# Patient Record
Sex: MALE | Race: WHITE | NOT HISPANIC OR LATINO | Employment: OTHER | URBAN - METROPOLITAN AREA
[De-identification: names, ages, dates, MRNs, and addresses within clinical notes are randomized per-mention and may not be internally consistent; named-entity substitution may affect disease eponyms.]

---

## 2017-05-08 ENCOUNTER — GENERIC CONVERSION - ENCOUNTER (OUTPATIENT)
Dept: OTHER | Facility: OTHER | Age: 77
End: 2017-05-08

## 2017-05-08 ENCOUNTER — ALLSCRIPTS OFFICE VISIT (OUTPATIENT)
Dept: OTHER | Facility: OTHER | Age: 77
End: 2017-05-08

## 2017-05-08 DIAGNOSIS — Z12.5 ENCOUNTER FOR SCREENING FOR MALIGNANT NEOPLASM OF PROSTATE: ICD-10-CM

## 2017-05-08 DIAGNOSIS — I10 ESSENTIAL (PRIMARY) HYPERTENSION: ICD-10-CM

## 2017-05-11 ENCOUNTER — GENERIC CONVERSION - ENCOUNTER (OUTPATIENT)
Dept: OTHER | Facility: OTHER | Age: 77
End: 2017-05-11

## 2017-05-16 ENCOUNTER — LAB CONVERSION - ENCOUNTER (OUTPATIENT)
Dept: OTHER | Facility: OTHER | Age: 77
End: 2017-05-16

## 2017-05-16 LAB
25(OH)D3 SERPL-MCNC: 62 NG/ML (ref 30–100)
A/G RATIO (HISTORICAL): 1.5 (CALC) (ref 1–2.5)
ALBUMIN SERPL BCP-MCNC: 3.9 G/DL (ref 3.6–5.1)
ALP SERPL-CCNC: 59 U/L (ref 40–115)
ALT SERPL W P-5'-P-CCNC: 13 U/L (ref 9–46)
AST SERPL W P-5'-P-CCNC: 20 U/L (ref 10–35)
BACTERIA UR QL AUTO: ABNORMAL /HPF
BASOPHILS # BLD AUTO: 0.5 %
BASOPHILS # BLD AUTO: 33 CELLS/UL (ref 0–200)
BILIRUB SERPL-MCNC: 0.6 MG/DL (ref 0.2–1.2)
BILIRUB UR QL STRIP: NEGATIVE
BUN SERPL-MCNC: 29 MG/DL (ref 7–25)
BUN/CREA RATIO (HISTORICAL): 19 (CALC) (ref 6–22)
CALCIUM SERPL-MCNC: 9.3 MG/DL (ref 8.6–10.3)
CHLORIDE SERPL-SCNC: 104 MMOL/L (ref 98–110)
CHOLEST SERPL-MCNC: 151 MG/DL (ref 125–200)
CHOLEST/HDLC SERPL: 3.7 (CALC)
CO2 SERPL-SCNC: 28 MMOL/L (ref 20–31)
COLOR UR: YELLOW
COMMENT (HISTORICAL): CLEAR
CREAT SERPL-MCNC: 1.51 MG/DL (ref 0.7–1.18)
DEPRECATED RDW RBC AUTO: 15.2 % (ref 11–15)
EGFR AFRICAN AMERICAN (HISTORICAL): 51 ML/MIN/1.73M2
EGFR-AMERICAN CALC (HISTORICAL): 44 ML/MIN/1.73M2
EOSINOPHIL # BLD AUTO: 247 CELLS/UL (ref 15–500)
EOSINOPHIL # BLD AUTO: 3.8 %
FECAL OCCULT BLOOD DIAGNOSTIC (HISTORICAL): NEGATIVE
GAMMA GLOBULIN (HISTORICAL): 2.6 G/DL (CALC) (ref 1.9–3.7)
GLUCOSE (HISTORICAL): 91 MG/DL (ref 65–99)
GLUCOSE (HISTORICAL): NEGATIVE
HCT VFR BLD AUTO: 43.7 % (ref 38.5–50)
HDLC SERPL-MCNC: 41 MG/DL
HGB BLD-MCNC: 14.9 G/DL (ref 13.2–17.1)
HYALINE CASTS #/AREA URNS LPF: ABNORMAL /LPF
KETONES UR STRIP-MCNC: NEGATIVE MG/DL
LDL CHOLESTEROL (HISTORICAL): 90 MG/DL (CALC)
LEUKOCYTE ESTERASE UR QL STRIP: NEGATIVE
LYMPHOCYTES # BLD AUTO: 2191 CELLS/UL (ref 850–3900)
LYMPHOCYTES # BLD AUTO: 33.7 %
MCH RBC QN AUTO: 32.3 PG (ref 27–33)
MCHC RBC AUTO-ENTMCNC: 34 G/DL (ref 32–36)
MCV RBC AUTO: 95 FL (ref 80–100)
MONOCYTES # BLD AUTO: 858 CELLS/UL (ref 200–950)
MONOCYTES (HISTORICAL): 13.2 %
NEUTROPHILS # BLD AUTO: 3172 CELLS/UL (ref 1500–7800)
NEUTROPHILS # BLD AUTO: 48.8 %
NITRITE UR QL STRIP: NEGATIVE
NON-HDL-CHOL (CHOL-HDL) (HISTORICAL): 110 MG/DL (CALC)
PH UR STRIP.AUTO: 5.5 [PH] (ref 5–8)
PLATELET # BLD AUTO: 147 THOUSAND/UL (ref 140–400)
PMV BLD AUTO: 9.8 FL (ref 7.5–12.5)
POTASSIUM SERPL-SCNC: 4.7 MMOL/L (ref 3.5–5.3)
PROSTATE SPECIFIC ANTIGEN TOTAL (HISTORICAL): 1.8 NG/ML
PROT UR STRIP-MCNC: ABNORMAL MG/DL
RBC # BLD AUTO: 4.6 MILLION/UL (ref 4.2–5.8)
RBC (HISTORICAL): ABNORMAL /HPF
SODIUM SERPL-SCNC: 141 MMOL/L (ref 135–146)
SP GR UR STRIP.AUTO: 1.03 (ref 1–1.03)
SQUAMOUS EPITHELIAL CELLS (HISTORICAL): ABNORMAL /HPF
TOTAL PROTEIN (HISTORICAL): 6.5 G/DL (ref 6.1–8.1)
TRIGL SERPL-MCNC: 98 MG/DL
TSH SERPL DL<=0.05 MIU/L-ACNC: 1.89 MIU/L (ref 0.4–4.5)
WBC # BLD AUTO: 6.5 THOUSAND/UL (ref 3.8–10.8)
WBC # BLD AUTO: ABNORMAL /HPF

## 2017-05-17 ENCOUNTER — GENERIC CONVERSION - ENCOUNTER (OUTPATIENT)
Dept: OTHER | Facility: OTHER | Age: 77
End: 2017-05-17

## 2017-06-14 DIAGNOSIS — I10 ESSENTIAL (PRIMARY) HYPERTENSION: ICD-10-CM

## 2017-06-14 LAB
BUN SERPL-MCNC: 26 MG/DL (ref 7–25)
BUN/CREA RATIO (HISTORICAL): 18 (CALC) (ref 6–22)
CALCIUM SERPL-MCNC: 9.3 MG/DL (ref 8.6–10.3)
CHLORIDE SERPL-SCNC: 106 MMOL/L (ref 98–110)
CO2 SERPL-SCNC: 31 MMOL/L (ref 20–31)
CREAT SERPL-MCNC: 1.48 MG/DL (ref 0.7–1.18)
EGFR AFRICAN AMERICAN (HISTORICAL): 52 ML/MIN/1.73M2
EGFR-AMERICAN CALC (HISTORICAL): 45 ML/MIN/1.73M2
GLUCOSE (HISTORICAL): 95 MG/DL (ref 65–99)
POTASSIUM SERPL-SCNC: 4.6 MMOL/L (ref 3.5–5.3)
SODIUM SERPL-SCNC: 143 MMOL/L (ref 135–146)

## 2017-08-16 ENCOUNTER — ALLSCRIPTS OFFICE VISIT (OUTPATIENT)
Dept: OTHER | Facility: OTHER | Age: 77
End: 2017-08-16

## 2017-08-16 DIAGNOSIS — I10 ESSENTIAL (PRIMARY) HYPERTENSION: ICD-10-CM

## 2017-09-13 ENCOUNTER — LAB CONVERSION - ENCOUNTER (OUTPATIENT)
Dept: OTHER | Facility: OTHER | Age: 77
End: 2017-09-13

## 2017-09-13 LAB
BUN SERPL-MCNC: 20 MG/DL (ref 7–25)
BUN/CREA RATIO (HISTORICAL): 14 (CALC) (ref 6–22)
CALCIUM SERPL-MCNC: 9.5 MG/DL (ref 8.6–10.3)
CHLORIDE SERPL-SCNC: 104 MMOL/L (ref 98–110)
CO2 SERPL-SCNC: 32 MMOL/L (ref 20–31)
CREAT SERPL-MCNC: 1.43 MG/DL (ref 0.7–1.18)
CREATININE, RANDOM URINE (HISTORICAL): 144 MG/DL (ref 20–370)
EGFR AFRICAN AMERICAN (HISTORICAL): 54 ML/MIN/1.73M2
EGFR-AMERICAN CALC (HISTORICAL): 47 ML/MIN/1.73M2
GLUCOSE (HISTORICAL): 91 MG/DL (ref 65–99)
POTASSIUM SERPL-SCNC: 4.4 MMOL/L (ref 3.5–5.3)
PROT UR-MCNC: 28 MG/DL (ref 5–25)
PROT/CREAT UR: 194 MG/G CREAT (ref 22–128)
SODIUM SERPL-SCNC: 143 MMOL/L (ref 135–146)
URIC ACID (HISTORICAL): 6.2 MG/DL (ref 4–8)

## 2017-11-14 ENCOUNTER — GENERIC CONVERSION - ENCOUNTER (OUTPATIENT)
Dept: OTHER | Facility: OTHER | Age: 77
End: 2017-11-14

## 2017-11-16 ENCOUNTER — GENERIC CONVERSION - ENCOUNTER (OUTPATIENT)
Dept: OTHER | Facility: OTHER | Age: 77
End: 2017-11-16

## 2017-11-21 ENCOUNTER — ALLSCRIPTS OFFICE VISIT (OUTPATIENT)
Dept: OTHER | Facility: OTHER | Age: 77
End: 2017-11-21

## 2017-11-22 NOTE — PROGRESS NOTES
Assessment    1  Never a smoker   2      Plan  Flu vaccine need    · Fluzone High-Dose 0 5 ML Intramuscular Suspension Prefilled Syringe  Health Maintenance    · Francisca Le  (Allergy/Immunology) Co-Management  *  Status: Hold For -Scheduling,Retrospective Authorization  Requested for: [id number]  Care Summary provided  : Yes    Discussion/Summary  Discussion Summary:   Jilda Nageotte is doing well he does did discuss his creatinine at length we reduced his hydrochlorothiazide to 12-,1/2 milligrams every other day his renal function seems to be stable  He will continue with the hydrochlorothiazide as noted above plus amlodipine 2 5 daily 5 milligrams of Crestor every other day and a variety of over-the-counter supplements that he likes and then 81 milligram aspirin will see him in 4 months for blood pressure check he is up-to-date with immunizations will refer him to an allergist       Chief Complaint  Chief Complaint Free Text Note Form: 6 month follow up  Chief Complaint [facility] Condition [facility]: Patient is here today for follow up of chronic conditions described in HPI  History of Present Illness  HPI: Fariha Solorio is here today for visit he feels quite well he just had a visit with the Dr Ce Mccullough his cardiologist which was satisfactory apparently Dr Delaney Jose ordered some lab studies which we have not yet received he feels well he and his wife are considering to downsized to a smaller residence  He had trouble with allergies in the past had been seeing Osiris Reeves is interested in seeing an allergist at this point      Review of Systems  Complete-Male:  Constitutional: No fever or chills, feels well, no tiredness, no recent weight gain or weight loss  Eyes: No complaints of eye pain, no red eyes, no discharge from eyes, no itchy eyes  ENT: as noted in HPI  Cardiovascular: No complaints of slow heart rate, no fast heart rate, no chest pain, no palpitations, no leg claudication, no lower extremity  Respiratory: No complaints of shortness of breath, no wheezing, no cough, no SOB on exertion, no orthopnea or PND  Gastrointestinal: No complaints of abdominal pain, no constipation, no nausea or vomiting, no diarrhea or bloody stools  Genitourinary: No complaints of dysuria, no incontinence, no hesitancy, no nocturia, no genital lesion, no testicular pain  Musculoskeletal: No complaints of arthralgia, no myalgias, no joint swelling or stiffness, no limb pain or swelling  Integumentary: No complaints of skin rash or skin lesions, no itching, no skin wound, no dry skin  Neurological: No compliants of headache, no confusion, no convulsions, no numbness or tingling, no dizziness or fainting, no limb weakness, no difficulty walking  Psychiatric: Is not suicidal, no sleep disturbances, no anxiety or depression, no change in personality, no emotional problems  Endocrine: No complaints of proptosis, no hot flashes, no muscle weakness, no erectile dysfunction, no deepening of the voice, no feelings of weakness  Hematologic/Lymphatic: No complaints of swollen glands, no swollen glands in the neck, does not bleed easily, no easy bruising  Active Problems  1  Atherosclerosis of coronary artery of native heart (414 01) (I25 10)   2  Benign hypertrophy of prostate (600 00) (N40 0)   3  Chronic renal disease (585 9) (N18 9)   4  Encounter for prostate cancer screening (V76 44) (Z12 5)   5  History of elevated prostate specific antigen (PSA) (V13 89) (B54 966)   6  Hyperlipidemia (272 4) (E78 5)   7  Hypertension (401 9) (I10)   8  Medicare annual wellness visit, subsequent (V70 0) (Z00 00)   9  Raynaud's syndrome without gangrene (443 0) (I73 00)    Past Medical History    1  Back pain (724 5) (M54 9)   2  History of 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 (600 21,599 69) (N40 1,N13 8)   3  History of elevated prostate specific antigen (PSA) (V13 89) (S25 415)    Surgical History  1   History of Inguinal Hernia Repair    Social History     ·    · Never a smoker  Social History Reviewed: The social history was reviewed and updated today  The social history was reviewed and is unchanged  Current Meds   1  AmLODIPine Besylate 2 5 MG Oral Tablet; TAKE 1 TABLET DAILY AS DIRECTED; Therapy: 39DME2003 to (Donita Broussard)  Requested for: [id number] Recorded   2  Aspirin 81 MG Oral Tablet Chewable; Therapy: (Shruti Schofield) to Recorded   3  Beta Carotene 44400 UNIT Oral Capsule; Therapy: (Shruti Schofield) to Recorded   4  Calcium 600 MG Oral Tablet; Therapy: (Shruti Schofield) to Recorded   5  Cinnamon 500 MG Oral Capsule; Therapy: (Shruti Schofield) to Recorded   6  CoQ-10 100 MG Oral Capsule; Therapy: (Shruti Schofield) to Recorded   7  Crestor 5 MG Oral Tablet; TAKE 1 TABLET EVERY OTHER DAY; Therapy: 21Nov2012 to Recorded   8  Finasteride 5 MG Oral Tablet; take 1 tablet every day; Therapy: 11VZJ2988 to ([phone])  Requested for: 27Oct2017; Last Rx:27Oct2017 Ordered   9  Glucosamine Chondr 500 Complex Oral Capsule; Therapy: (Shruti Schofield) to Recorded   10  Hair/Skin/Nails Oral Tablet; Therapy: (Shruti Schofield) to Recorded   11  HydroCHLOROthiazide 12 5 MG Oral Capsule; TAKE 1 CAPSULE EVERY OTHER DAY; Therapy: 39KKA7757 to Recorded   12  Magnesium 250 MG Oral Tablet; Therapy: (Shruti Schofield) to Recorded   13  Multi Vitamin/Minerals Oral Tablet; Therapy: (Shruti Schofield) to Recorded   14  Omega 3 1000 MG Oral Capsule; Therapy: (Shruti Schofield) to Recorded   15  Prevagen CAPS; Therapy: (Recorded:77Hsz7912) to Recorded   16  Saw [place] CAPS; Therapy: (Shruti Schofield) to Recorded  Medication List Reviewed: The medication list was reviewed and updated today  Allergies  1   No Known Drug Allergies    Vitals  Vital Signs    Recorded: 21Nov2017 10:50AM Recorded: 54XVR4080 10:05AM   Heart Rate  61   Pulse Quality Normal    Systolic 337 Diastolic 76    Height  5 ft 8 in   Weight  136 lb 8 oz   BMI Calculated  20 75   BSA Calculated  1 74   O2 Saturation  97       Physical Exam   Constitutional  General appearance: No acute distress, well appearing and well nourished  Eyes  Conjunctiva and lids: No swelling, erythema, or discharge  Pulmonary  Respiratory effort: No increased work of breathing or signs of respiratory distress  Auscultation of lungs: Clear to auscultation, equal breath sounds bilaterally, no wheezes, no rales, no rhonci  Cardiovascular  Auscultation of heart: Normal rate and rhythm, normal S1 and S2, without murmurs  Examination of extremities for edema and/or varicosities: Normal    Carotid pulses: Normal    Skin  Skin and subcutaneous tissue: Normal without rashes or lesions  Psychiatric  Orientation to person, place and time: Normal    Mood and affect: Normal          Future Appointments    Date/Time Provider Specialty Site   11/24/2017 09:00 AM Claudeen Mow, MD Urology [address] [place] [address]   05/08/2018 09:00 AM LEVY Arshad   Internal Medicine [facility] IM       Signatures   Electronically signed by : LEVY Chi ; Nov 21 2017 10:52AM EST                       (Author)

## 2017-11-24 ENCOUNTER — ALLSCRIPTS OFFICE VISIT (OUTPATIENT)
Dept: OTHER | Facility: OTHER | Age: 77
End: 2017-11-24

## 2017-11-24 LAB
BILIRUB UR QL STRIP: NORMAL
CLARITY UR: NORMAL
COLOR UR: YELLOW
GLUCOSE (HISTORICAL): NORMAL
HGB UR QL STRIP.AUTO: NORMAL
KETONES UR STRIP-MCNC: NORMAL MG/DL
LEUKOCYTE ESTERASE UR QL STRIP: NORMAL
NITRITE UR QL STRIP: NORMAL
PH UR STRIP.AUTO: 6 [PH]
PROT UR STRIP-MCNC: NORMAL MG/DL
SP GR UR STRIP.AUTO: 1.02
UROBILINOGEN UR QL STRIP.AUTO: 0.2

## 2018-01-09 ENCOUNTER — GENERIC CONVERSION - ENCOUNTER (OUTPATIENT)
Dept: INTERNAL MEDICINE CLINIC | Facility: CLINIC | Age: 78
End: 2018-01-09

## 2018-01-11 NOTE — PROGRESS NOTES
Assessment   1  Encounter for preventive health examination (V70 0) (Z00 00)  2  Medicare annual wellness visit, subsequent (V70 0) (Z00 00)  3  Atherosclerosis of coronary artery of native heart (414 01) (I25 10)  4  Benign hypertrophy of prostate (600 00) (N40 0)  5  Hypertension (401 9) (I10)    Plan  Back pain    · 1 - iNlda GARCIA, Mandy Bundy  (Sports Medicine) Co-Management  *  Status: Hold For -  Scheduling  Requested for: 43MHR8773  () Care Summary provided  : Yes  Encounter for prostate cancer screening, Hypertension    · (1) PSA (SCREEN) (Dx V76 44 Screen for Prostate Cancer); Status:Active; Requested  for:08May2017; Health Maintenance    · There are many exercise options for seniors ; Status:Complete;   Done: 49VJD2907  Hypertension    · (1) CBC/PLT/DIFF; Status:Active; Requested for:08May2017;    · (1) COMPREHENSIVE METABOLIC PANEL; Status:Active; Requested for:08May2017;    · (1) LIPID PANEL, FASTING; Status:Active; Requested for:08May2017;    · (1) TSH; Status:Active; Requested for:08May2017;    · (1) URINALYSIS (will reflex a microscopy if leukocytes, occult blood, protein or nitrites  are not within normal limits); Status:Active; Requested for:08May2017;    · (1) VITAMIN D 25-HYDROXY; Status:Active; Requested for:08May2017;   PMH: Screening for other and unspecified genitourinary condition    · *VB - Urinary Incontinence Screen (Dx Z13 89 Screen for UI);  Status:Complete;   Done:  38KHB7592 09:29AM    Discussion/Summary    Radha Tijerina appears a healthy vigorous and animated I do not perceive any minute deficits at this time detail examination the head ears eyes is and throat neck heart lungs abdomen extremities peripheral pulses are normal he has active and brisk knee and ankle jerks normal straight leg raising normal sensation excellent pulses in the feet skin changes suggestive of tinea versicolor patient's quite stable complete set of lab studies will be done in view of the long history of low back pain we'll refer him to an orthopedist changes are made in his medications medication list is appended in the chart see him at his request in one year follow-up with his other physicians is encouraged  Impression: healthy adult male  Chief Complaint  yearly well exam       History of Present Illness  HPI: Leatha Rivera is here today for a visit he spent 2 months in [facility] this winter weather was only good part of the time  While he was here he developed some pain in his left foot saw physician there who gave him 2 injections into the foot and also to into his back of the foot pain went away but the back pain has remained in retrospect he's had some generalized low back pain for least a year usually is worse in the morning and seems to negro when he is actually exercises the pain does not radiate into his legs is no numbness or tingling and it is usually improved to some extent by over-the-counter NSAIDs  He will be seeing Dr Juana Boyer diverts his urologist  He saw his cardiologist Dr Caryn Bray in November  He also sees a dermatologist because of a history of melanoma is otherwise active and vigorous he's up-to-date with colonoscopy immunizations seems to be enjoying life  He has been using using an over-the-counter product called Prevagin which she believes has helped his memory       Welcome to [employer] and Wellness Visits: The patient is being seen for the subsequent annual wellness visit  Medicare Screening and Risk Factors   Hospitalizations: no previous hospitalizations  Medicare Screening Tests Risk Questions   Abdominal aortic aneurysm risk assessment: over 72years of age  Osteoporosis risk assessment: over 48years of age  HIV risk assessment: none indicated  Drug and Alcohol Use: The patient has never smoked cigarettes  The patient reports never drinking alcohol  He has never used illicit drugs     Diet and Physical Activity: Current diet includes well balanced meals, low fat food choices, low carbohydrate food choices, low salt food choices, limited junk food, 1 servings of fruit per day, 1 servings of vegetables per day, 1 servings of meat per day, 1 servings of whole grains per day, 1 servings of simple carbohydrates per day, 1 servings of dairy products per day, 1-2 cups of tea per day and 1-2 cans of diet soda per day  He exercises 7 times per week  Exercise: walking 3 minutes per day  Mood Disorder and Cognitive Impairment Screening: PHQ-9 Depression Scale   Over the past 2 weeks, how often have you been bothered by the following problems? 1 ) Little interest or pleasure in doing things? Not at all    2 ) Feeling down, depressed or hopeless? Not at all    3 ) Trouble falling asleep or sleeping too much? Not at all    4 ) Feeling tired or having little energy? Not at all    5 ) Poor appetite or overeating? Not at all    6 ) Feeling bad about yourself, or that you are a failure, or have let yourself or your family down? Not at all    7 ) Trouble concentrating on things, such as reading a newspaper or watching television? Not at all    8 ) Moving or speaking so slowly that other people could have noticed, or the opposite, moving or speaking faster than usual? Not at all    9 ) Thoughts that you would be off dead or of hurting yourself in some way? Not at all  TOTAL SCORE: 0  He denies feeling down, depressed, or hopeless over the past two weeks  He denies feeling little interest or pleasure in doing things over the past two weeks  Cognitive impairment screening: denies difficulty learning/retaining new information, denies difficulty handling complex tasks, denies difficulty with reasoning, denies difficulty with spatial ability and orientation, denies difficulty with language and denies difficulty with behavior  Functional Ability/Level of Safety: Hearing is slightly decreased, slightly decreased in the right ear, slightly decreased in the left ear and a hearing aid is used   He reports hearing difficulties  The patient is currently able to do activities of daily living without limitations, able to do instrumental activities of daily living without limitations, able to participate in social activities without limitations and able to drive without limitations  Activities of daily living details: does not need help using the phone, no transportation help needed, does not need help shopping, no meal preparation help needed, does not need help doing laundry and does not need help managing money  Fall risk factors: The patient fell 0 times in the past 12 months , no polypharmacy, no alcohol use, no mobility impairment, no antidepressant use, no deconditioning, no postural hypotension, no sedative use, no visual impairment, no urinary incontinence, no antihypertensive use, no cognitive impairment, up and go test was normal and no previous fall  Home safety risk factors:  no unfamiliar surroundings, no loose rugs, no poor household lighting, no uneven floors, no household clutter, grab bars in the bathroom and handrails on the stairs  Advance Directives: Advance directives: living will, durable power of  for health care directives and advance directives  Co-Managers and Medical Equipment/Suppliers: See Patient Care Team      Review of Systems    Constitutional: No fever or chills, feels well, no tiredness, no recent weight gain or weight loss  Eyes: No complaints of eye pain, no red eyes, no discharge from eyes, no itchy eyes  ENT: no complaints of earache, no hearing loss, no nosebleeds, no nasal discharge, no sore throat, no hoarseness  Cardiovascular: No complaints of slow heart rate, no fast heart rate, no chest pain, no palpitations, no leg claudication, no lower extremity  Respiratory: No complaints of shortness of breath, no wheezing, no cough, no SOB on exertion, no orthopnea or PND     Gastrointestinal: No complaints of abdominal pain, no constipation, no nausea or vomiting, no diarrhea or bloody stools  Genitourinary: No complaints of dysuria, no incontinence, no hesitancy, no nocturia, no genital lesion, no testicular pain  Musculoskeletal: as noted in HPI  Integumentary: No complaints of skin rash or skin lesions, no itching, no skin wound, no dry skin  Neurological: No compliants of headache, no confusion, no convulsions, no numbness or tingling, no dizziness or fainting, no limb weakness, no difficulty walking  Psychiatric: Is not suicidal, no sleep disturbances, no anxiety or depression, no change in personality, no emotional problems  Endocrine: No complaints of proptosis, no hot flashes, no muscle weakness, no erectile dysfunction, no deepening of the voice, no feelings of weakness  Hematologic/Lymphatic: No complaints of swollen glands, no swollen glands in the neck, does not bleed easily, no easy bruising  Active Problems   1  Atherosclerosis of coronary artery of native heart (414 01) (I25 10)  2  Benign hypertrophy of prostate (600 00) (N40 0)  3  Chronic renal disease (585 9) (N18 9)  4  Encounter for prostate cancer screening (V76 44) (Z12 5)  5  Hyperlipidemia (272 4) (E78 5)  6  Hypertension (401 9) (I10)  7  Medicare annual wellness visit, subsequent (V70 0) (Z00 00)  8  Raynaud's syndrome without gangrene (443 0) (I73 00)    Social History    ·    · Never A Smoker    Current Meds  1  AmLODIPine Besylate 2 5 MG Oral Tablet; TAKE 1 TABLET DAILY AS DIRECTED; Therapy: 34YME4810 to (Camacho Rivero)  Requested for: 42NIP4577 Recorded  2  Aspirin 81 MG Oral Tablet Chewable; Therapy: (0660 303 88 06) to Recorded  3  Beta Carotene 96873 UNIT Oral Capsule; Therapy: (0660 303 88 06) to Recorded  4  Calcium 600 MG Oral Tablet; Therapy: (0660 303 88 06) to Recorded  5  Cinnamon 500 MG Oral Capsule; Therapy: (0660 303 88 06) to Recorded  6  CoQ-10 100 MG Oral Capsule; Therapy: (0660 303 88 06) to Recorded  7  Crestor 5 MG Oral Tablet; TAKE 1 TABLET EVERY OTHER DAY; Therapy: 21Nov2012 to Recorded  8  Finasteride 5 MG Oral Tablet; Therapy: (Carolina Has) to Recorded  9  Glucosamine Chondr 500 Complex Oral Capsule; Therapy: (Carolina Has) to Recorded  10  Hair/Skin/Nails Oral Tablet; Therapy: (Carolina Has) to Recorded  11  HydroCHLOROthiazide 12 5 MG Oral Capsule; TAKE 1 CAPSULE EVERY OTHER DAY; Therapy: [id number] to Recorded  12  Magnesium 250 MG Oral Tablet; Therapy: (Carolina Has) to Recorded  13  Multi Vitamin/Minerals Oral Tablet; Therapy: (Carolina Has) to Recorded  14  Omega 3 1000 MG Oral Capsule; Therapy: (Carolina Has) to Recorded  15  Prevagen CAPS; Therapy: (Recorded:01Pyq0358) to Recorded  16  Saw [place] CAPS; Therapy: (Recorded:28Jan2013) to Recorded    Allergies   1  No Known Drug Allergies    Immunizations   ** Printed in Appendix #1 below  Vitals  Signs    Pulse Quality: Normal  Systolic: 487  Diastolic: 70    Physical Exam    Constitutional   General appearance: No acute distress, well appearing and well nourished  Head and Face   Head and face: Normal     Eyes   Conjunctiva and lids: No erythema, swelling or discharge  Pupils and irises: Equal, round, reactive to light  Ophthalmoscopic examination: Normal fundi and optic discs  Ears, Nose, Mouth, and Throat   External inspection of ears and nose: Normal     Otoscopic examination: Tympanic membranes translucent with normal light reflex  Canals patent without erythema  Lips, teeth, and gums: Normal, good dentition  Oropharynx: Normal with no erythema, edema, exudate or lesions  Neck   Neck: Supple, symmetric, trachea midline, no masses  Thyroid: Normal, no thyromegaly  Pulmonary   Respiratory effort: No increased work of breathing or signs of respiratory distress      Percussion of chest: Normal     Palpation of chest: Normal     Auscultation of lungs: Clear to auscultation  Cardiovascular   Palpation of heart: Normal PMI, no thrills  Auscultation of heart: Normal rate and rhythm, normal S1 and S2, no murmurs  Carotid pulses: 2+ bilaterally  Not palpable  Pedal pulses: 2+ bilaterally  Examination of extremities for edema and/or varicosities: Normal     Abdomen   Abdomen: Non-tender, no masses  Liver and spleen: No hepatomegaly or splenomegaly  Musculoskeletal   Gait and station: Normal   Small Dupuytren's contracture left hand  Psychiatric   Judgment and insight: Normal     Orientation to person, place and time: Normal     Recent and remote memory: Intact  Mood and affect: Normal        Results/Data  *VB - Urinary Incontinence Screen (Dx Z13 89 Screen for UI) 94CQX1550 09:29AM Yocasta Cheese     Test Name Result Flag Reference   Urinary Incontinence Assessment [id number]       Falls Risk Assessment (Dx Z13 89 Screen for Neurologic Disorder) 80MZH0121 09:25AM User, Ahs     Test Name Result Flag Reference   Falls Risk      No falls in the past year     PHQ-9 Adult Depression Screening 12SPU4353 09:25AM User, Ahs     Test Name Result Flag Reference   PHQ-9 Adult Depression Score 0     Over the last two weeks, how often have you been bothered by any of the following problems? Little interest or pleasure in doing things: Not at all - 0  Feeling down, depressed, or hopeless: Not at all - 0  Trouble falling or staying asleep, or sleeping too much: Not at all - 0  Feeling tired or having little energy: Not at all - 0  Poor appetite or over eating: Not at all - 0  Feeling bad about yourself - or that you are a failure or have let yourself or your family down: Not at all - 0  Trouble concentrating on things, such as reading the newspaper or watching television: Not at all - 0  Moving or speaking so slowly that other people could have noticed   Or the opposite -  being so fidgety or restless that you have been moving around a lot more than usual: Not at all - 0  Thoughts that you would be better off dead, or of hurting yourself in some way: Not at all - 0   PHQ-9 Adult Depression Screening Negative     PHQ-9 Difficulty Level Not difficult at all     PHQ-9 Severity No Depression         Future Appointments    Date/Time Provider Specialty Site   2018 09:00 AM LEVY Voss   Internal Medicine [facility] IM     Signatures   Electronically signed by : LEVY Cartwright ; May  8 2017  7:17PM EST                       (Author)    Appendix #1     Patient: Divya Akers ; : 1940; MRN: [id number]      1 2 3 4 5 6    Influenza  17-Oct-2007 29-Oct-2008 21-Oct-2009 18-Oct-2010 21-Nov-2011 03-Dec-2013    PCV  06-May-2016         PPSV  2006         Zoster  2007

## 2018-01-11 NOTE — PROGRESS NOTES
Chief Complaint  flu shot      Active Problems    1  Allergic rhinitis (477 9) (J30 9)   2  Atherosclerosis of coronary artery of native heart (414 01) (I25 10)   3  Benign hypertrophy of prostate (600 00) (N40 0)   4  Cervicalgia (723 1) (M54 2)   5  Chronic renal disease (585 9) (N18 9)   6  Contusion of left thumb with damage to nail, initial encounter (923 3) (S60 112A)   7  Contusion of left thumb with damage to nail, subsequent encounter (V58 89,923 3)   (S60 112D)   8  Hyperlipidemia (272 4) (E78 5)   9  Hypertension (401 9) (I10)   10  Medicare annual wellness visit, subsequent (V70 0) (Z00 00)   11  Need for prophylactic vaccination and inoculation against influenza (V04 81) (Z23)   12  Need for vaccination with 13-polyvalent pneumococcal conjugate vaccine (V03 82) (Z23)   13  Numbness (782 0) (R20 0)   14  Raynaud's syndrome without gangrene (443 0) (I73 00)    Current Meds   1  AmLODIPine Besylate 2 5 MG Oral Tablet; TAKE 1 TABLET DAILY AS DIRECTED; Therapy: 17GIO5125 to (Davion Hurley)  Requested for: 19RKJ5908 Recorded   2  Aspirin 81 MG Oral Tablet Chewable; Therapy: (Wendy May) to Recorded   3  Beta Carotene 29131 UNIT Oral Capsule; Therapy: (Wendy May) to Recorded   4  Calcium 600 MG Oral Tablet; Therapy: (Wendy Clinoliver) to Recorded   5  Cinnamon 500 MG Oral Capsule; Therapy: (Myronta Clines) to Recorded   6  Colon Cleanse CAPS; Therapy: (Recorded:77Npj8848) to Recorded   7  CoQ-10 100 MG Oral Capsule; Therapy: (Kinalotta Clines) to Recorded   8  Crestor 5 MG Oral Tablet; TAKE 1 TABLET EVERY OTHER DAY; Therapy: 21Nov2012 to Recorded   9  Diclofenac Sodium 1 5 % Transdermal Solution; 4-6 gtts BID x 7 days; Therapy: 20RDR2685 to (Last Rx:02Nov2015)  Requested for: 65TZC7841 Ordered   10  Diclofenac Sodium 50 MG Oral Tablet Delayed Release; Take 1 tablet twice daily; Therapy: 92HTQ5808 to (Stefano Guerra)  Requested for: 70SKK0807;  Last EU:63ZTI1160 Ordered   11  Finasteride 5 MG Oral Tablet; Therapy: (Ling Diana) to Recorded   12  Fluticasone Propionate 50 MCG/ACT Nasal Suspension; SPRAY TWO SPRAYS INTO    EACH NOSTRIL ONCE DAILY; Therapy: 31ZTP7904 to (Evaluate:63Xhz8794)  Requested for: 26MYU2570; Last    VD:50NPS0700 Ordered   13  Glucosamine Chondr 500 Complex Oral Capsule; Therapy: (Ling Diana) to Recorded   14  Hair/Skin/Nails Oral Tablet; Therapy: (Ling Diana) to Recorded   15  HydroCHLOROthiazide 12 5 MG Oral Capsule; TAKE 1 CAPSULE EVERY OTHER DAY; Therapy: 26UYP5784 to Recorded   16  Magnesium 250 MG Oral Tablet; Therapy: (Ling Diana) to Recorded   17  Multi Vitamin/Minerals Oral Tablet; Therapy: (Ling Diana) to Recorded   18  Omega 3 1000 MG Oral Capsule; Therapy: (Ling Diana) to Recorded   19  Prevagen CAPS; Therapy: (Recorded:72Wqw7606) to Recorded   20  Saw [place] CAPS; Therapy: (Recorded:35Nip7446) to Recorded    Allergies    1  No Known Drug Allergies    Assessment    1  Flu vaccine need (V04 81) (Z23)    Plan  Flu vaccine need    · Fluzone High-Dose 0 5 ML Intramuscular Suspension Prefilled Syringe    Future Appointments    Date/Time Provider Specialty Site   05/08/2017 09:20 AM LEVY Roche   Internal Medicine [facility]     Signatures   Electronically signed by : LEVY Saini ; Oct 28 2016  6:34PM EST                       (Author)

## 2018-01-12 VITALS — SYSTOLIC BLOOD PRESSURE: 132 MMHG | DIASTOLIC BLOOD PRESSURE: 70 MMHG

## 2018-01-13 VITALS
DIASTOLIC BLOOD PRESSURE: 76 MMHG | WEIGHT: 136.5 LBS | BODY MASS INDEX: 20.69 KG/M2 | HEART RATE: 61 BPM | OXYGEN SATURATION: 97 % | HEIGHT: 68 IN | SYSTOLIC BLOOD PRESSURE: 120 MMHG

## 2018-01-13 VITALS — DIASTOLIC BLOOD PRESSURE: 70 MMHG | SYSTOLIC BLOOD PRESSURE: 132 MMHG

## 2018-01-13 NOTE — PROGRESS NOTES
Assessment    1  Medicare annual wellness visit, subsequent (V70 0) (Z00 00)   2     3  Need for vaccination with 13-polyvalent pneumococcal conjugate vaccine (V03 82) (Z23)   4  Atherosclerosis of coronary artery of native heart (414 01) (I25 10)    Plan  Allergic rhinitis    · Allegra Allergy 180 MG Oral Tablet (Fexofenadine HCl)   · Fluticasone Propionate 50 MCG/ACT Nasal Suspension; SPRAY TWO SPRAYS  INTO EACH NOSTRIL ONCE DAILY  Need for vaccination with 13-polyvalent pneumococcal conjugate vaccine    · Prevnar 13 Intramuscular Suspension    Discussion/Summary  Impression: Subsequent Annual Wellness Visit  Cardiovascular screening and counseling: screening is current  Diabetes screening and counseling: screening is current  Colorectal cancer screening and counseling: due for a colonoscopy (low risk) and Probably need colonoscopy after figure out where his last colonoscopy was  Prostate cancer screening and counseling: screening is current  Osteoporosis screening and counseling: screening not indicated  Abdominal aortic aneurysm screening and counseling: screening not indicated  Glaucoma screening and counseling: screening is current  HIV screening and counseling: screening not indicated  Immunizations: influenza vaccine is up to date this year, pneumococcal vaccine due today and Zostavax vaccination up to date  Advance Directive Planning: complete and up to date  Patient Discussion: follow-up visit needed in one year  Chief Complaint  ANNUAL WELLNESS      History of Present Illness  Welcome to [employer] and Wellness Visits: The patient is being seen for the subsequent annual wellness visit  Medicare Screening and Risk Factors   Hospitalizations: no previous hospitalizations  Medicare Screening Tests Risk Questions   Drug and Alcohol Use: The patient has never smoked cigarettes  The patient reports never drinking alcohol  He has never used illicit drugs     Diet and Physical Activity: Current diet includes well balanced meals, 1 servings of fruit per day, 1 servings of vegetables per day, 1/2 servings of meat per day, 1 servings of whole grains per day, 1 servings of simple carbohydrates per day, 1 servings of dairy products per day, 1 cups of tea per day and 1/2 cans of diet soda per day  He exercises daily  Exercise: walking, stretching 60 minutes per day  Mood Disorder and Cognitive Impairment Screening: PHQ-9 Depression Scale   Over the past 2 weeks, how often have you been bothered by the following problems? 1 ) Little interest or pleasure in doing things? Not at all    2 ) Feeling down, depressed or hopeless? Not at all    3 ) Trouble falling asleep or sleeping too much? Not at all    4 ) Feeling tired or having little energy? Not at all    5 ) Poor appetite or overeating? Not at all    6 ) Feeling bad about yourself, or that you are a failure, or have let yourself or your family down? Not at all    7 ) Trouble concentrating on things, such as reading a newspaper or watching television? Not at all    8 ) Moving or speaking so slowly that other people could have noticed, or the opposite, moving or speaking faster than usual? Not at all    9 ) Thoughts that you would be off dead or of hurting yourself in some way? Not at all  He denies feeling down, depressed, or hopeless over the past two weeks  He denies feeling little interest or pleasure in doing things over the past two weeks  Cognitive impairment screening: denies difficulty learning/retaining new information, denies difficulty handling complex tasks, denies difficulty with reasoning, denies difficulty with spatial ability and orientation, denies difficulty with language and denies difficulty with behavior  Functional Ability/Level of Safety: Hearing is slightly decreased, slightly decreased in the right ear, slightly decreased in the left ear and a hearing aid is not used  He reports hearing difficulties   Activities of daily living details: does not need help using the phone, no transportation help needed, does not need help shopping, no meal preparation help needed, does not need help doing housework, does not need help doing laundry, does not need help managing medications and does not need help managing money  Fall risk factors: The patient fell 0 times in the past 12 months  and no urinary incontinence  Home safety risk factors:  no grab bars in the bathroom, but no unfamiliar surroundings, no loose rugs, no poor household lighting, no uneven floors, no household clutter and handrails on the stairs  Advance Directives: Advance directives: living will, durable power of  for health care directives and advance directives  Co-Managers and Medical Equipment/Suppliers: See Patient Care Team      Patient Care Team    Care Team Member Role Specialty Office Number      [phone]     Review of Systems    Constitutional: no fever, no malaise and no fatigue  Head and Face: facial pain, but no facial pressure  Eyes: no eye pain and no watery discharge from the eyes  ENT: scratchy throat, hoarseness and nasal congestion, but no sore throat, no earache and no hearing loss  Cardiovascular: no chest pain, no palpitations and no lower extremity edema  Respiratory: no shortness of breath, no wheezing and no cough  Gastrointestinal: Does not know last time he had a colonoscopy, but no abdominal pain, no nausea, no constipation and no bright red blood per rectum  Genitourinary: Follows Dr Gurwinder Bray for urological examination, but no dysuria and no urinary frequency  Musculoskeletal: diffuse joint pain and Bilateral SI joint dysfunction  Integumentary and Breasts: no rashes and no skin lesions  Neurological: no headache, no confusion, no leg weakness and no tingling  Psychiatric: no anxiety and no depression  Endocrine: no muscle weakness     Hematologic and Lymphatic: no tendency for easy bleeding and no tendency for easy bruising  Active Problems    1  Allergic rhinitis (477 9) (J30 9)   2  Benign hypertrophy of prostate (600 00) (N40 0)   3  Cervicalgia (723 1) (M54 2)   4  Chronic renal disease (585 9) (N18 9)   5  Contusion of left thumb with damage to nail, initial encounter (923 3) (S60 112A)   6  Contusion of left thumb with damage to nail, subsequent encounter (V58 89,923 3)   (S60 112D)   7  Hyperlipidemia (272 4) (E78 5)   8  Hypertension (401 9) (I10)   9  Need for prophylactic vaccination and inoculation against influenza (V04 81) (Z23)   10  Numbness (782 0) (R20 0)   11  Raynaud's syndrome without gangrene (443 0) (I73 00)    Past Medical History    · Need for prophylactic vaccination and inoculation against influenza (V04 81) (Z23)    The active problems and past medical history were reviewed and updated today  Surgical History    The surgical history was reviewed and updated today  Family History    The family history was reviewed and updated today  Social History    ·    · Never A Smoker  The social history was reviewed and updated today  Current Meds   1  Allegra Allergy 180 MG Oral Tablet; Therapy: 21Nov2012 to Recorded   2  AmLODIPine Besylate 2 5 MG Oral Tablet; TAKE 1 TABLET DAILY AS DIRECTED; Therapy: 01JHL4290 to (Sulaiman Roberson)  Requested for: 71CEA4330 Recorded   3  Aspirin 81 MG Oral Tablet Chewable; Therapy: (Kenny Sleight) to Recorded   4  Beta Carotene 98431 UNIT Oral Capsule; Therapy: (Kenny Sleight) to Recorded   5  Calcium 600 MG Oral Tablet; Therapy: (Kenny Sleight) to Recorded   6  Cinnamon 500 MG Oral Capsule; Therapy: (Kneny Sleight) to Recorded   7  Colon Cleanse CAPS; Therapy: (Recorded:54Rmv5731) to Recorded   8  CoQ-10 100 MG Oral Capsule; Therapy: (Kenny Sleight) to Recorded   9  Crestor 5 MG Oral Tablet; TAKE 1 TABLET EVERY OTHER DAY; Therapy: 21Nov2012 to Recorded   10   Diclofenac Sodium 1 5 % Transdermal Solution; 4-6 gtts BID x 7 days; Therapy: 03PKO6442 to (Last Rx:02Nov2015)  Requested for: 66YDH2564 Ordered   11  Diclofenac Sodium 50 MG Oral Tablet Delayed Release; Take 1 tablet twice daily; Therapy: 49FNQ3696 to (Ariel Miguel Alynda)  Requested for: 63CMT1414; Last    Rx:06Jan2014 Ordered   12  Finasteride 5 MG Oral Tablet; Therapy: (0699 982 13 20) to Recorded   13  Glucosamine Chondr 500 Complex Oral Capsule; Therapy: (0699 982 13 20) to Recorded   14  Hair/Skin/Nails Oral Tablet; Therapy: (0699 982 13 20) to Recorded   15  Hydrochlorothiazide 12 5 MG Oral Capsule; TAKE 1 CAPSULE EVERY OTHER DAY; Therapy: 66LWN4971 to Recorded   16  Magnesium 250 MG Oral Tablet; Therapy: (0699 982 13 20) to Recorded   17  Multi Vitamin/Minerals Oral Tablet; Therapy: (0699 982 13 20) to Recorded   18  Omega 3 1000 MG Oral Capsule; Therapy: (0699 982 13 20) to Recorded   19  Prevagen CAPS; Therapy: (Recorded:77Nso3460) to Recorded   20  Saw [place] CAPS; Therapy: (0699 982 13 20) to Recorded    The medication list was reviewed and updated today  Allergies    1  No Known Drug Allergies    Immunizations   ** Printed in Appendix #1 below  Vitals  Signs [Data Includes: Current Encounter]    Heart Rate: 73  Systolic: 089  Diastolic: 80  Height: 5 ft 8 in  Weight: 139 lb 6 08 oz  BMI Calculated: 21 19  BSA Calculated: 1 76  O2 Saturation: 95    Physical Exam    Constitutional   General appearance: No acute distress, well appearing and well nourished  Head and Face   Head and face: Normal     Eyes   Conjunctiva and lids: No erythema, swelling or discharge  Ears, Nose, Mouth, and Throat Positive facial pressure  Oropharynx: Abnormal   Postnasal drainage mild erythema of the throat  Neck   Neck: Supple, symmetric, trachea midline, no masses  Thyroid: Normal, no thyromegaly      Pulmonary   Respiratory effort: No increased work of breathing or signs of respiratory distress  Auscultation of lungs: Clear to auscultation  Cardiovascular   Auscultation of heart: Normal rate and rhythm, normal S1 and S2, no murmurs  Carotid pulses: 2+ bilaterally  Pedal pulses: 2+ bilaterally  Examination of extremities for edema and/or varicosities: Normal     Abdomen   Abdomen: Non-tender, no masses  Genitourinary Deferred to urology  Lymphatic   Palpation of lymph nodes in neck: No lymphadenopathy  Palpation of lymph nodes in axillae: No lymphadenopathy  Musculoskeletal   Gait and station: Abnormal   Mild thoracic dextroscoliosis  Inspection/palpation of digits and nails: Normal without clubbing or cyanosis  Inspection/palpation of joints, bones, and muscles: Abnormal   Mild SI tenderness  Range of motion: Normal     Muscle strength/tone: Normal     Neurologic   Cranial nerves: Cranial nerves 2-12 intact  Cortical function: Normal mental status  Coordination: Normal finger to nose and heel to shin  Psychiatric   Judgment and insight: Normal     Orientation to person, place and time: Normal     Recent and remote memory: Intact  Mood and affect: Normal        Results/Data  Falls Risk Assessment (Dx V80 09 Screen for Neurologic Disorder) 29KTQ6724 11:54AM User, Ahs     Test Name Result Flag Reference   Falls Risk      No falls in the past year       Future Appointments    Date/Time Provider Specialty Site   2017 09:20 AM LEVY Bee   Internal Medicine [facility]     Signatures   Electronically signed by : LEVY Lara ; May  6 2016 12:22PM EST                       (Author)    Appendix #1     Patient: Chaim Johns ; : 1940; MRN: [id number]      1 2 3 4 5 6    Influenza  [id number] [id number] [id number] [id number] [id number]    Pneumococcal  2006         Zoster  [id number]

## 2018-01-14 VITALS
SYSTOLIC BLOOD PRESSURE: 112 MMHG | HEIGHT: 68 IN | WEIGHT: 137 LBS | BODY MASS INDEX: 20.76 KG/M2 | DIASTOLIC BLOOD PRESSURE: 68 MMHG

## 2018-01-14 VITALS — SYSTOLIC BLOOD PRESSURE: 120 MMHG | DIASTOLIC BLOOD PRESSURE: 80 MMHG

## 2018-05-11 RX ORDER — FINASTERIDE 5 MG/1
5 TABLET, FILM COATED ORAL DAILY
Refills: 3 | COMMUNITY
Start: 2018-04-14 | End: 2018-11-12 | Stop reason: SDUPTHER

## 2018-05-11 RX ORDER — ROSUVASTATIN CALCIUM 5 MG/1
1 TABLET, COATED ORAL EVERY OTHER DAY
COMMUNITY
Start: 2012-11-21 | End: 2018-05-31 | Stop reason: SDUPTHER

## 2018-05-11 RX ORDER — AMLODIPINE BESYLATE 5 MG/1
5 TABLET ORAL DAILY
Refills: 3 | COMMUNITY
Start: 2018-03-06 | End: 2018-12-14 | Stop reason: SDUPTHER

## 2018-05-11 RX ORDER — ASPIRIN 81 MG/1
TABLET, CHEWABLE ORAL
COMMUNITY
End: 2018-09-11 | Stop reason: HOSPADM

## 2018-05-11 RX ORDER — CHLORAL HYDRATE 500 MG
CAPSULE ORAL
COMMUNITY
End: 2018-10-03

## 2018-05-11 RX ORDER — MULTIVITAMIN WITH IRON
TABLET ORAL
COMMUNITY
End: 2022-01-05

## 2018-05-11 RX ORDER — AMPICILLIN TRIHYDRATE 250 MG
CAPSULE ORAL
COMMUNITY
End: 2018-09-11 | Stop reason: ALTCHOICE

## 2018-05-11 RX ORDER — BILBERRY FRUIT 1000 MG
CAPSULE ORAL
COMMUNITY
End: 2018-10-03

## 2018-05-11 RX ORDER — BETA-CAROTENE 7500 MCG
CAPSULE ORAL
COMMUNITY
End: 2019-01-02 | Stop reason: ALTCHOICE

## 2018-05-11 RX ORDER — HYDROCHLOROTHIAZIDE 12.5 MG/1
1 CAPSULE, GELATIN COATED ORAL EVERY OTHER DAY
COMMUNITY
Start: 2012-11-21 | End: 2018-09-11 | Stop reason: ALTCHOICE

## 2018-05-11 RX ORDER — IBUPROFEN 200 MG
CAPSULE ORAL
COMMUNITY
End: 2018-09-11 | Stop reason: HOSPADM

## 2018-05-14 ENCOUNTER — OFFICE VISIT (OUTPATIENT)
Dept: INTERNAL MEDICINE CLINIC | Facility: CLINIC | Age: 78
End: 2018-05-14
Payer: COMMERCIAL

## 2018-05-14 DIAGNOSIS — Z13.21 ENCOUNTER FOR VITAMIN DEFICIENCY SCREENING: ICD-10-CM

## 2018-05-14 DIAGNOSIS — Z13.220 SCREENING CHOLESTEROL LEVEL: ICD-10-CM

## 2018-05-14 DIAGNOSIS — J32.9 SINUSITIS, UNSPECIFIED CHRONICITY, UNSPECIFIED LOCATION: Primary | ICD-10-CM

## 2018-05-14 DIAGNOSIS — I10 HYPERTENSION, UNSPECIFIED TYPE: ICD-10-CM

## 2018-05-14 DIAGNOSIS — Z13.29 SCREENING FOR THYROID DISORDER: ICD-10-CM

## 2018-05-14 PROCEDURE — 3078F DIAST BP <80 MM HG: CPT | Performed by: INTERNAL MEDICINE

## 2018-05-14 PROCEDURE — 1101F PT FALLS ASSESS-DOCD LE1/YR: CPT | Performed by: INTERNAL MEDICINE

## 2018-05-14 PROCEDURE — 99213 OFFICE O/P EST LOW 20 MIN: CPT | Performed by: INTERNAL MEDICINE

## 2018-05-14 PROCEDURE — 3075F SYST BP GE 130 - 139MM HG: CPT | Performed by: INTERNAL MEDICINE

## 2018-05-14 RX ORDER — UBIDECARENONE 100 MG
100 CAPSULE ORAL
COMMUNITY
Start: 2011-11-22 | End: 2019-01-02 | Stop reason: ALTCHOICE

## 2018-05-14 RX ORDER — ASPIRIN 81 MG/1
81 TABLET ORAL
COMMUNITY
Start: 2011-10-05

## 2018-05-14 RX ORDER — BILBERRY FRUIT 1000 MG
CAPSULE ORAL
COMMUNITY
Start: 2011-10-05 | End: 2018-05-31 | Stop reason: SDUPTHER

## 2018-05-14 RX ORDER — HYDROCHLOROTHIAZIDE 25 MG/1
TABLET ORAL
Refills: 3 | COMMUNITY
Start: 2018-05-09 | End: 2021-03-23

## 2018-05-14 RX ORDER — ROSUVASTATIN CALCIUM 5 MG/1
10 TABLET, COATED ORAL
COMMUNITY
Start: 2017-11-16

## 2018-05-14 RX ORDER — FEXOFENADINE HCL 180 MG/1
180 TABLET ORAL
COMMUNITY
Start: 2011-10-05 | End: 2018-09-11 | Stop reason: ALTCHOICE

## 2018-05-14 RX ORDER — BETA-CAROTENE 7500 MCG
25000 CAPSULE ORAL
COMMUNITY
Start: 2012-11-26 | End: 2018-09-11 | Stop reason: ALTCHOICE

## 2018-05-14 RX ORDER — AMLODIPINE BESYLATE 2.5 MG/1
2.5 TABLET ORAL
COMMUNITY
Start: 2014-11-18 | End: 2019-09-18

## 2018-05-14 RX ORDER — FINASTERIDE 5 MG/1
5 TABLET, FILM COATED ORAL
COMMUNITY
Start: 2012-11-26 | End: 2018-09-11 | Stop reason: ALTCHOICE

## 2018-05-14 RX ORDER — AMPICILLIN TRIHYDRATE 250 MG
500 CAPSULE ORAL
COMMUNITY
Start: 2012-11-26 | End: 2019-01-02 | Stop reason: ALTCHOICE

## 2018-05-14 NOTE — PROGRESS NOTES
Assessment/Plan:    No problem-specific Assessment & Plan notes found for this encounter  Diagnoses and all orders for this visit:    Sinusitis, unspecified chronicity, unspecified location  -     Ambulatory referral to Allergy; Future    Hypertension, unspecified type    Encounter for vitamin deficiency screening  -     Vitamin D 25 hydroxy; Future    Screening cholesterol level  -     CBC and differential; Future  -     Comprehensive metabolic panel; Future  -     Lipid panel; Future    Screening for thyroid disorder  -     TSH, 3rd generation with T4 reflex; Future    Other orders  -     amLODIPine (NORVASC) 5 mg tablet; Take 5 mg by mouth daily  -     aspirin 81 mg chewable tablet; Chew  -     beta carotene 85653 UNIT capsule; Take by mouth  -     Calcium 600 MG tablet; Take by mouth  -     Cinnamon 500 MG capsule; Take by mouth  -     Coenzyme Q10 (COQ-10) 100 MG CAPS; Take by mouth  -     rosuvastatin (CRESTOR) 5 mg tablet; Take 1 tablet by mouth every other day  -     finasteride (PROSCAR) 5 mg tablet; Take 5 mg by mouth daily  -     Glucosamine-Chondroit-Vit C-Mn (GLUCOSAMINE CHONDR 500 COMPLEX PO); Take by mouth  -     hydrochlorothiazide (MICROZIDE) 12 5 mg capsule; Take 1 capsule by mouth every other day  -     Biotin w/ Vitamins C & E (HAIR/SKIN/NAILS) 1250-7 5-7 5 MCG-MG-UNT CHEW; Chew  -     Magnesium 250 MG TABS; Take by mouth  -     Multiple Vitamins-Minerals (MULTI-VITAMIN/MINERALS PO); Take by mouth  -     Omega-3 1000 MG CAPS; Take by mouth  -     Apoaequorin (PREVAGEN) 10 MG CAPS; Take by mouth  -     Saw [place] 1000 MG CAPS; Take by mouth  -     aspirin (ECOTRIN LOW STRENGTH) 81 mg EC tablet; Take 81 mg by mouth  -     beta carotene 23939 UNIT capsule; Take 25,000 Units by mouth  -     Calcium Carb-Cholecalciferol (CALCIUM 1000 + D) 1000-800 MG-UNIT TABS; Take by mouth  -     co-enzyme Q-10 100 mg capsule; Take 100 mg by mouth  -     diclofenac sodium (VOLTAREN) 50 mg EC tablet;  Take 50 mg by mouth  -     fexofenadine (ALLEGRA) 180 MG tablet; Take 180 mg by mouth  -     Glucosamine-Chondroitin--400-300 MG TABS; Take by mouth  -     hydrochlorothiazide (HYDRODIURIL) 25 mg tablet; TAKE 1 TABLET (25 MG TOTAL) BY MOUTH DAILY  -     Multiple Vitamins-Minerals (MULTIVITAMIN ADULT EXTRA C PO); Take by mouth  -     DOCOSAHEXAENOIC ACID PO; Take by mouth  -     Saw [place] 1000 MG CAPS; Take by mouth  -     amLODIPine (NORVASC) 2 5 mg tablet; Take 2 5 mg by mouth  -     Cinnamon 500 MG capsule; Take 500 mg by mouth  -     finasteride (PROSCAR) 5 mg tablet; Take 5 mg by mouth  -     rosuvastatin (CRESTOR) 5 mg tablet; TAKE 1 TABLET (5 MG TOTAL) BY MOUTH NIGHTLY  Subjective:      Patient ID: Holly Gamboa is a 65 y o  male  HPI    [place] Modesta is here today for visit  He feels quite well  He and his wife are  now residing at [facility] in [place]  Where they been for the last 4 weeks  They have 3 children his wife has been under treatment for breast cancer  He will be seeing his cardiologist Dr Armando Hamlin in the near future  He remains under the care of Dr Larisa Marsh his urologist who he sees on a yearly basis  He takes his medications correctly he has had both pneumonia vaccines in the Zostavax years ago he had a hernia repaired many years ago and still feels occasional peculiar sensations in his groin which she would like me to check    He is having some trouble with the sinuses he saw an otolaryngologist previously but would like to see an allergist this time he has been using all the typical over counter medications without much relief    The following portions of the patient's history were reviewed and updated as appropriate: allergies, current medications, past family history, past medical history, past social history, past surgical history and problem list     Review of Systems    Noncontributory  Objective:      /78   Pulse 56   Ht 5' 8" (1 727 m)   Wt 63 1 kg (139 lb 3 2 oz)   SpO2 96%   BMI 21 17 kg/m²          Physical Exam  on physical exam he is a lean trim gentleman in no distress his pressure is 130/78  Head is normal in contour extraocular movements are unremarkable neck veins are flat carotid pulses are normal the chest is clear and the cardiac examination is unremarkable  His abdomen is soft  No abnormalities are palpable  He has a barely perceptible will right inguinal hernia scar I do not feel any recurring hernias on either side nor there any abnormalities palpable in the groin extremities are without edema the patient is stable he will continue with Crestor 5 mg every other day HCTZ 12 5 mg every other day and amlodipine 2 5 daily    Complete set of lab studies will be ordered copies which will be sent to Dr Delaney Jose his cardiologist   We talked him about the news shingles vaccine and would like to wait until post marketing surveillance is better established will see him back in 6 months

## 2018-05-16 VITALS
OXYGEN SATURATION: 96 % | WEIGHT: 139.2 LBS | HEIGHT: 68 IN | HEART RATE: 56 BPM | DIASTOLIC BLOOD PRESSURE: 78 MMHG | BODY MASS INDEX: 21.1 KG/M2 | SYSTOLIC BLOOD PRESSURE: 130 MMHG

## 2018-05-31 ENCOUNTER — OFFICE VISIT (OUTPATIENT)
Dept: INTERNAL MEDICINE CLINIC | Facility: CLINIC | Age: 78
End: 2018-05-31
Payer: COMMERCIAL

## 2018-05-31 VITALS
DIASTOLIC BLOOD PRESSURE: 70 MMHG | WEIGHT: 140 LBS | SYSTOLIC BLOOD PRESSURE: 110 MMHG | BODY MASS INDEX: 21.22 KG/M2 | OXYGEN SATURATION: 97 % | TEMPERATURE: 97.4 F | RESPIRATION RATE: 68 BRPM | HEIGHT: 68 IN | HEART RATE: 60 BPM

## 2018-05-31 DIAGNOSIS — M25.552 LEFT HIP PAIN: Primary | ICD-10-CM

## 2018-05-31 PROCEDURE — 99213 OFFICE O/P EST LOW 20 MIN: CPT | Performed by: INTERNAL MEDICINE

## 2018-05-31 PROCEDURE — 3008F BODY MASS INDEX DOCD: CPT | Performed by: INTERNAL MEDICINE

## 2018-05-31 NOTE — PROGRESS NOTES
Assessment/Plan:     Diagnoses and all orders for this visit:    Left hip pain  -     XR hip/pelv 2-3 vws left if performed; Future      Reji Devlin was seen and examined in the office today  We discussed his symptoms and exam  At this point, I gave given him a worksheet of quadriceps/hip exercises to do for the next 2 weeks  If the pain persists, I am going to have him complete an xray  Otherwise no other changes were made  Subjective:      Patient ID: Rosenda Grace is a 65 y o  male  Reji Devlin is a pleasant man that is here today with complaints of left sided hip/leg pain  He reports this started possibly 5 weeks ago  He is unaware of any injuries  Denies swelling  It is in fact not bothersome at all during the day  He reports he will suddenly feel a pain the in left hip/outer thigh when he is sleeping  There is no pattern with it  He is pretty active with walking and denies complaints then  No back pain or other joint pains  He has taken Advil at bedtime but this has not made it disappear  Hip Pain    The incident occurred more than 1 week ago  There was no injury mechanism  The pain is present in the left leg and left hip  The quality of the pain is described as aching and cramping  The pain is moderate  The pain has been fluctuating since onset  Pertinent negatives include no inability to bear weight, loss of motion, loss of sensation, muscle weakness, numbness or tingling  He has tried NSAIDs for the symptoms  The treatment provided no relief  The following portions of the patient's history were reviewed and updated as appropriate: allergies, current medications, past family history, past medical history, past social history, past surgical history and problem list     Review of Systems   Constitutional: Negative for fatigue, fever and unexpected weight change  Cold sweats in middle of the night   Respiratory: Negative for cough and shortness of breath      Cardiovascular: Negative for chest pain and leg swelling  Gastrointestinal: Negative for abdominal pain and nausea  Musculoskeletal: Positive for arthralgias  Negative for back pain, gait problem, joint swelling, myalgias and neck pain  Skin: Negative for rash  Neurological: Negative for tingling, weakness and numbness  Objective:      /70 (BP Location: Left arm, Patient Position: Sitting, Cuff Size: Adult)   Pulse 60   Temp (!) 97 4 °F (36 3 °C) (Tympanic)   Resp (!) 68   Ht 5' 8" (1 727 m)   Wt 63 5 kg (140 lb)   SpO2 97%   BMI 21 29 kg/m²          Physical Exam   Constitutional: He is oriented to person, place, and time  He appears well-developed and well-nourished  HENT:   Head: Normocephalic and atraumatic  Eyes: Conjunctivae and EOM are normal    Cardiovascular: Normal rate  Pulmonary/Chest: Effort normal    Musculoskeletal: Normal range of motion  He exhibits tenderness  He exhibits no edema  Left hip: He exhibits tenderness  External rotation of the left hip causes mild discomfort   Neurological: He is alert and oriented to person, place, and time  Skin: Skin is warm and dry  Psychiatric: He has a normal mood and affect  His behavior is normal  Judgment and thought content normal    Vitals reviewed

## 2018-06-02 ENCOUNTER — APPOINTMENT (OUTPATIENT)
Dept: RADIOLOGY | Age: 78
End: 2018-06-02
Payer: COMMERCIAL

## 2018-06-02 DIAGNOSIS — M25.552 LEFT HIP PAIN: ICD-10-CM

## 2018-06-02 PROCEDURE — 73502 X-RAY EXAM HIP UNI 2-3 VIEWS: CPT

## 2018-06-12 ENCOUNTER — TELEPHONE (OUTPATIENT)
Dept: INTERNAL MEDICINE CLINIC | Facility: CLINIC | Age: 78
End: 2018-06-12

## 2018-06-12 NOTE — TELEPHONE ENCOUNTER
Per Dr Juvenal Romero, Patient is to stop the calcium plus D supplement  Patient has a Vitamin D level of 81 1  Left voicemail for the patient

## 2018-06-14 ENCOUNTER — TELEPHONE (OUTPATIENT)
Dept: INTERNAL MEDICINE CLINIC | Facility: CLINIC | Age: 78
End: 2018-06-14

## 2018-06-14 NOTE — TELEPHONE ENCOUNTER
Left message, advised that per Dr Tracie Mohr just the Calcium plus D should be stopped  Should patient D/C  Glucosamine plus D should as well?

## 2018-06-15 NOTE — TELEPHONE ENCOUNTER
Spoke to the patients wife and advised her to have Mr Concepcion D/C the Glucosamine plus D as well

## 2018-09-11 ENCOUNTER — OFFICE VISIT (OUTPATIENT)
Dept: INTERNAL MEDICINE CLINIC | Facility: CLINIC | Age: 78
End: 2018-09-11
Payer: COMMERCIAL

## 2018-09-11 VITALS
WEIGHT: 139.2 LBS | HEIGHT: 68 IN | BODY MASS INDEX: 21.1 KG/M2 | HEART RATE: 61 BPM | TEMPERATURE: 98.7 F | OXYGEN SATURATION: 97 %

## 2018-09-11 DIAGNOSIS — M79.605 LEFT LEG PAIN: Primary | ICD-10-CM

## 2018-09-11 PROCEDURE — 99214 OFFICE O/P EST MOD 30 MIN: CPT | Performed by: INTERNAL MEDICINE

## 2018-10-03 ENCOUNTER — OFFICE VISIT (OUTPATIENT)
Dept: URGENT CARE | Age: 78
End: 2018-10-03
Payer: COMMERCIAL

## 2018-10-03 ENCOUNTER — OFFICE VISIT (OUTPATIENT)
Dept: OBGYN CLINIC | Facility: OTHER | Age: 78
End: 2018-10-03
Payer: COMMERCIAL

## 2018-10-03 VITALS
TEMPERATURE: 98.7 F | WEIGHT: 140 LBS | HEIGHT: 68 IN | RESPIRATION RATE: 18 BRPM | OXYGEN SATURATION: 96 % | DIASTOLIC BLOOD PRESSURE: 70 MMHG | BODY MASS INDEX: 21.22 KG/M2 | SYSTOLIC BLOOD PRESSURE: 120 MMHG | HEART RATE: 60 BPM

## 2018-10-03 VITALS
DIASTOLIC BLOOD PRESSURE: 78 MMHG | HEART RATE: 52 BPM | HEIGHT: 68 IN | SYSTOLIC BLOOD PRESSURE: 123 MMHG | WEIGHT: 140 LBS | BODY MASS INDEX: 21.22 KG/M2

## 2018-10-03 DIAGNOSIS — H11.31 SUBCONJUNCTIVAL HEMORRHAGE OF RIGHT EYE: Primary | ICD-10-CM

## 2018-10-03 DIAGNOSIS — M79.605 LEFT LEG PAIN: ICD-10-CM

## 2018-10-03 DIAGNOSIS — M76.32 IT BAND SYNDROME, LEFT: Primary | ICD-10-CM

## 2018-10-03 PROCEDURE — 99213 OFFICE O/P EST LOW 20 MIN: CPT | Performed by: PHYSICIAN ASSISTANT

## 2018-10-03 PROCEDURE — 99204 OFFICE O/P NEW MOD 45 MIN: CPT | Performed by: ORTHOPAEDIC SURGERY

## 2018-10-03 NOTE — LETTER
October 3, 2018     Patient: Rahel Gloria   YOB: 1940   Date of Visit: 10/3/2018       To Whom It May Concern: It is my medical opinion that Victor M Bermudez has a medical condition that is not contagious           Sincerely,        Sadi Resendiz PA-C    CC: No Recipients

## 2018-10-03 NOTE — PROGRESS NOTES
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    · We did discuss in detail treatment for left hip bursitis and IT band tendinitis  This treatment included physical therapy for stretching and strengthening exercises, NSAIDs, and injections  · Patient agreed at this time he would like to start physical therapy on outpatient basis for range of motion and strengthening of the left hip and IT band  · We did discuss possibility of bursa injection in the future if he is not progressing well with physical therapy  · Continue with ice and analgesics as needed  Return in about 6 weeks (around 2018)  At that time at the patient is not progressing well with physical therapy will consider further imaging or evaluation for his back  History of Present Illness  Sigrid Krueger is a 65 y o  male who presents for evaluation of left hip pain with radiation laterally down to the ankle that started in May of 2018 without specific cause  Patient's wife did note that the pain started after they were in the process of moving which occurred a lot of packing and moving of boxes  The patient has been taking Aleve for the pain on occasion without much relief  He states that the pain is often on seems to start from the lateral aspect of the hip and commonly radiate down the lateral aspect of the thigh  On occasion the pain does radiate past the thigh to the ankle but usually it is very localized to the thigh  He denies any low back or SI joint pain at this time  Review of Systems  Pertinent items are noted in HPI  All other systems were reviewed and are negative      Physical Exam  /78   Pulse (!) 52   Ht 5' 8" (1 727 m)   Wt 63 5 kg (140 lb)   BMI 21 29 kg/m² Cons: Appears well  No apparent distress  Psych: Alert  Oriented x3  Mood and affect normal   Eyes: PERRLA, EOMI  Resp: Normal effort  No audible wheezing or stridor  CV: Palpable pulse  No discernable arrhythmia  No LE edema  Lymph:  No palpable cervical, axillary, or inguinal lymphadenopathy  Skin: Warm  No palpable masses  No visible lesions  Neuro: Normal muscle tone  Normal and symmetric DTR's  Left Hip Exam  Alignment / Posture:  Normal lumbar alignment  Normal resting hip posture  Inspection:  No swelling  No edema  No ecchymosis  No deformity  Palpation:  Moderate tenderness at The lateral aspect of the left thigh over the trochanter and down the IT band     ROM:  Normal hip ROM  Rotation in both directions does cause some lateral hip pain though he is not limited in movement  His left hip moves through more range of motion in his right hip which is status post ORIF in the past   Strength:  5/5 quadriceps and hamstrings  Stability:  No objective hip instability  Tests:  (-) Straight leg raise  Neurovascular:  Sensation intact in DP/SP/Kelley/Sa/T nerve distributions  Sensation intact in all digital nerve distributions  Toes warm and perfused  Gait:  Normal     Studies Reviewed  XR of left hip and Pelvis - Show no fracture dislocation, does show very mild arthritic changes of the left hip  Pelvic x-rays do show some 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 and healed right femur fracture at this time  Procedures  No procedures today  Medical, Surgical, Family, and Social History  The patient's medical history, family history, and social history, were reviewed and updated as appropriate  Past Medical History:   Diagnosis Date    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     Elevated PSA     R   11/16/17       Past Surgical History:   Procedure Laterality Date    INGUINAL HERNIA REPAIR         Family History   Problem Relation Age of Onset    COPD Mother     Heart failure Mother     Heart attack Father        Social History     Occupational History    Not on file  Social History Main Topics    Smoking status: Never Smoker    Smokeless tobacco: Never Used    Alcohol use No    Drug use: No    Sexual activity: Not on file       Allergies   Allergen Reactions    Abciximab Other (See Comments)     rcd 1/16/02    Other      Other reaction(s): itchy watery eyes  Other reaction(s): itchy watery eyes         Current Outpatient Prescriptions:     amLODIPine (NORVASC) 5 mg tablet, Take 5 mg by mouth daily, Disp: , Rfl: 3    Apoaequorin (PREVAGEN) 10 MG CAPS, Take by mouth, Disp: , Rfl:     aspirin (ECOTRIN LOW STRENGTH) 81 mg EC tablet, Take 81 mg by mouth, Disp: , Rfl:     beta carotene 19755 UNIT capsule, Take by mouth, Disp: , Rfl:     Biotin w/ Vitamins C & E (HAIR/SKIN/NAILS) 1250-7 5-7 5 MCG-MG-UNT CHEW, Chew, Disp: , Rfl:     Cinnamon 500 MG capsule, Take 500 mg by mouth, Disp: , Rfl:     co-enzyme Q-10 100 mg capsule, Take 100 mg by mouth, Disp: , Rfl:     finasteride (PROSCAR) 5 mg tablet, Take 5 mg by mouth daily, Disp: , Rfl: 3    hydrochlorothiazide (HYDRODIURIL) 25 mg tablet, TAKE 1 TABLET (25 MG TOTAL) BY MOUTH DAILY  , Disp: , Rfl: 3    Magnesium 250 MG TABS, Take by mouth, Disp: , Rfl:     Multiple Vitamins-Minerals (MULTI-VITAMIN/MINERALS PO), Take by mouth, Disp: , Rfl:     rosuvastatin (CRESTOR) 5 mg tablet, TAKE 1 TABLET (5 MG TOTAL) BY MOUTH NIGHTLY , Disp: , Rfl:     amLODIPine (NORVASC) 2 5 mg tablet, Take 2 5 mg by mouth, Disp: , Rfl:       Olena Espinosa PA-C    Scribe Attestation    I,:    am acting as a scribe while in the presence of the attending physician :        I,:    personally performed the services described in this documentation    as scribed in my presence :

## 2018-10-03 NOTE — PROGRESS NOTES
[facility]        NAME: Lindsay Tilley is a 65 y o  male  : 1940    MRN: [id number]  DATE: October 3, 2018  TIME: 12:47 PM    Assessment and Plan   Subconjunctival hemorrhage of right eye [H11 31]  1  Subconjunctival hemorrhage of right eye           Patient Instructions       Follow up with PCP in 3-5 days  Proceed to  ER if symptoms worsen  Chief Complaint     Chief Complaint   Patient presents with    Other     pt woke with a red eye X 1 week ago   denies injury   denies pain or visual changes    Other     SNELLEN: (R) 20/30 (L) 20/40 (Both) 20/30 (corrected)         History of Present Illness       Patient is here for evaluation of redness in his right eye  Patient's wife live in [place] in were sent here for evaluation due to concerns over possible pinkeye  Patient states he woke up a week ago with redness in his eye which has spread some  He denies any pain, visual disturbance, double vision, blurred vision, headache, nausea, loss of vision  The patient did use some drops which did help some but the redness is persisting  He denies any discharge, congestion, runny nose  Patient denies any known injury or trauma to the eye  Review of Systems   Review of Systems   Constitutional: Negative  HENT: Negative for congestion  Eyes: Positive for redness  Negative for photophobia, pain, discharge, itching and visual disturbance           Current Medications       Current Outpatient Prescriptions:     amLODIPine (NORVASC) 2 5 mg tablet, Take 2 5 mg by mouth, Disp: , Rfl:     amLODIPine (NORVASC) 5 mg tablet, Take 5 mg by mouth daily, Disp: , Rfl: 3    Apoaequorin (PREVAGEN) 10 MG CAPS, Take by mouth, Disp: , Rfl:     aspirin (ECOTRIN LOW STRENGTH) 81 mg EC tablet, Take 81 mg by mouth, Disp: , Rfl:     beta carotene 07412 UNIT capsule, Take by mouth, Disp: , Rfl:     Biotin w/ Vitamins C & E (HAIR/SKIN/NAILS) 1250-7 5-7 5 MCG-MG-UNT CHEW, Chew, Disp: , Rfl:    Cinnamon 500 MG capsule, Take 500 mg by mouth, Disp: , Rfl:     co-enzyme Q-10 100 mg capsule, Take 100 mg by mouth, Disp: , Rfl:     diclofenac sodium (VOLTAREN) 50 mg EC tablet, Take 50 mg by mouth, Disp: , Rfl:     finasteride (PROSCAR) 5 mg tablet, Take 5 mg by mouth daily, Disp: , Rfl: 3    hydrochlorothiazide (HYDRODIURIL) 25 mg tablet, TAKE 1 TABLET (25 MG TOTAL) BY MOUTH DAILY  , Disp: , Rfl: 3    Magnesium 250 MG TABS, Take by mouth, Disp: , Rfl:     Multiple Vitamins-Minerals (MULTI-VITAMIN/MINERALS PO), Take by mouth, Disp: , Rfl:     Omega-3 1000 MG CAPS, Take by mouth, Disp: , Rfl:     rosuvastatin (CRESTOR) 5 mg tablet, TAKE 1 TABLET (5 MG TOTAL) BY MOUTH NIGHTLY , Disp: , Rfl:     Saw [place] 1000 MG CAPS, Take by mouth, Disp: , Rfl:     Current Allergies     Allergies as of 10/03/2018 - Reviewed 10/03/2018   Allergen Reaction Noted    Abciximab Other (See Comments)     Other  01/14/2016            The following portions of the patient's history were reviewed and updated as appropriate: allergies, current medications, past family history, past medical history, past social history, past surgical history and problem list      Past Medical History:   Diagnosis Date    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     Elevated PSA     R   11/16/17       Past Surgical History:   Procedure Laterality Date    INGUINAL HERNIA REPAIR         Family History   Problem Relation Age of Onset    COPD Mother     Heart failure Mother     Heart attack Father          Medications have been verified  Objective   /70 (BP Location: Right arm, Patient Position: Sitting, Cuff Size: Standard)   Pulse 60   Temp 98 7 °F (37 1 °C) (Oral)   Resp 18   Ht 5' 8" (1 727 m)   Wt 63 5 kg (140 lb)   SpO2 96%   BMI 21 29 kg/m²        Physical Exam     Physical Exam   Constitutional: He is oriented to person, place, and time  He appears well-developed and well-nourished   No distress  HENT:   Head: Normocephalic and atraumatic  Eyes: Pupils are equal, round, and reactive to light  EOM are normal    Subconjunctival hemorrhage of the right eye inferior aspect which is resolving  Neurological: He is alert and oriented to person, place, and time  Skin: Skin is warm and dry  Psychiatric: He has a normal mood and affect  His behavior is normal    Nursing note and vitals reviewed

## 2018-10-03 NOTE — PATIENT INSTRUCTIONS
Follow-up with your ophthalmologist if the symptoms are worsening  Follow-up with your primary care physician if symptoms persist or you have any increased bruising  Continue current treatment plan

## 2018-10-05 ENCOUNTER — OFFICE VISIT (OUTPATIENT)
Dept: INTERNAL MEDICINE CLINIC | Facility: CLINIC | Age: 78
End: 2018-10-05
Payer: COMMERCIAL

## 2018-10-05 DIAGNOSIS — H11.31 SUBCONJUNCTIVAL HEMORRHAGE OF RIGHT EYE: Primary | ICD-10-CM

## 2018-10-05 DIAGNOSIS — Z23 NEED FOR IMMUNIZATION AGAINST INFLUENZA: ICD-10-CM

## 2018-10-05 PROCEDURE — G0008 ADMIN INFLUENZA VIRUS VAC: HCPCS

## 2018-10-05 PROCEDURE — 99213 OFFICE O/P EST LOW 20 MIN: CPT | Performed by: INTERNAL MEDICINE

## 2018-10-05 PROCEDURE — 90662 IIV NO PRSV INCREASED AG IM: CPT

## 2018-10-05 NOTE — PROGRESS NOTES
Assessment/Plan:         Diagnoses and all orders for this visit:    Subconjunctival hemorrhage of right eye  -     CBC and differential  -     Comprehensive metabolic panel; Future   advise the patient to hold aspirin for the next 2 or 3 days  Patient will seek urgent ophthalmology consultation as well  Advised cold compresses and avoidance of any exertional activity  Need for immunization against influenza  -     influenza vaccine, [id number], high-dose, PF 0 5 mL, for patients 65 yr+ (FLUZONE HIGH-DOSE)        Subjective:      Patient ID: Cam Amin is a 65 y o  male  Patient is here for an acute visit complaining of right eye redness that started about 7 days ago  Patient woke up 1 day with redness in the eye  He denies having any trauma scratching, any exertional activity including cough or sneezing  His wife accompanies him today and reports he was taking several over-the-counter supplements including fish oil which she stopped about 2 weeks ago  He does take a baby aspirin a day  He denies any eye pain or vision changes  It appears the hemorrhage is not getting better  Patient denies any headache  His blood pressure is excellent today at 110/70  Patient was seen for the same 2 days ago at Urgent Care        Current Outpatient Prescriptions:     amLODIPine (NORVASC) 2 5 mg tablet, Take 2 5 mg by mouth, Disp: , Rfl:     amLODIPine (NORVASC) 5 mg tablet, Take 5 mg by mouth daily, Disp: , Rfl: 3    Apoaequorin (PREVAGEN) 10 MG CAPS, Take by mouth, Disp: , Rfl:     aspirin (ECOTRIN LOW STRENGTH) 81 mg EC tablet, Take 81 mg by mouth, Disp: , Rfl:     beta carotene 62141 UNIT capsule, Take by mouth, Disp: , Rfl:     Biotin w/ Vitamins C & E (HAIR/SKIN/NAILS) 1250-7 5-7 5 MCG-MG-UNT CHEW, Chew, Disp: , Rfl:     Cinnamon 500 MG capsule, Take 500 mg by mouth, Disp: , Rfl:     co-enzyme Q-10 100 mg capsule, Take 100 mg by mouth, Disp: , Rfl:     finasteride (PROSCAR) 5 mg tablet, Take 5 mg by mouth daily, Disp: , Rfl: 3    hydrochlorothiazide (HYDRODIURIL) 25 mg tablet, TAKE 1 TABLET (25 MG TOTAL) BY MOUTH DAILY  , Disp: , Rfl: 3    Magnesium 250 MG TABS, Take by mouth, Disp: , Rfl:     Multiple Vitamins-Minerals (MULTI-VITAMIN/MINERALS PO), Take by mouth, Disp: , Rfl:     rosuvastatin (CRESTOR) 5 mg tablet, TAKE 1 TABLET (5 MG TOTAL) BY MOUTH NIGHTLY , Disp: , Rfl:     The following portions of the patient's history were reviewed and updated as appropriate: allergies, current medications, past medical history, past social history, past surgical history and problem list     Review of Systems   Constitutional: Negative for chills and fever  Eyes:        As above         Objective: There were no vitals taken for this visit  Physical Exam   Eyes:   Right eye is bloodshot red  Extra movements are unrestricted and comfortable  No vision changes  Pupils are bilaterally equal reactive to light  This is a small area of discoloration at the lateral scleral margin as well as the  Inferior aspect of the sclera  The upper margin of the sclera is completely clear

## 2018-10-06 LAB
ALBUMIN SERPL-MCNC: 4.1 G/DL (ref 3.6–5.1)
ALBUMIN/GLOB SERPL: 1.6 (CALC) (ref 1–2.5)
ALP SERPL-CCNC: 50 U/L (ref 40–115)
ALT SERPL-CCNC: 14 U/L (ref 9–46)
AST SERPL-CCNC: 21 U/L (ref 10–35)
BASOPHILS # BLD AUTO: 62 CELLS/UL (ref 0–200)
BASOPHILS NFR BLD AUTO: 0.8 %
BILIRUB SERPL-MCNC: 0.7 MG/DL (ref 0.2–1.2)
BUN SERPL-MCNC: 25 MG/DL (ref 7–25)
BUN/CREAT SERPL: 17 (CALC) (ref 6–22)
CALCIUM SERPL-MCNC: 10 MG/DL (ref 8.6–10.3)
CHLORIDE SERPL-SCNC: 100 MMOL/L (ref 98–110)
CO2 SERPL-SCNC: 34 MMOL/L (ref 20–32)
CREAT SERPL-MCNC: 1.45 MG/DL (ref 0.7–1.18)
EOSINOPHIL # BLD AUTO: 123 CELLS/UL (ref 15–500)
EOSINOPHIL NFR BLD AUTO: 1.6 %
ERYTHROCYTE [DISTWIDTH] IN BLOOD BY AUTOMATED COUNT: 13.7 % (ref 11–15)
GLOBULIN SER CALC-MCNC: 2.5 G/DL (CALC) (ref 1.9–3.7)
GLUCOSE SERPL-MCNC: 86 MG/DL (ref 65–139)
HCT VFR BLD AUTO: 44.3 % (ref 38.5–50)
HGB BLD-MCNC: 15 G/DL (ref 13.2–17.1)
LYMPHOCYTES # BLD AUTO: 1771 CELLS/UL (ref 850–3900)
LYMPHOCYTES NFR BLD AUTO: 23 %
MCH RBC QN AUTO: 32.1 PG (ref 27–33)
MCHC RBC AUTO-ENTMCNC: 33.9 G/DL (ref 32–36)
MCV RBC AUTO: 94.7 FL (ref 80–100)
MONOCYTES # BLD AUTO: 1070 CELLS/UL (ref 200–950)
MONOCYTES NFR BLD AUTO: 13.9 %
NEUTROPHILS # BLD AUTO: 4674 CELLS/UL (ref 1500–7800)
NEUTROPHILS NFR BLD AUTO: 60.7 %
PLATELET # BLD AUTO: 151 THOUSAND/UL (ref 140–400)
PMV BLD REES-ECKER: 11.5 FL (ref 7.5–12.5)
POTASSIUM SERPL-SCNC: 5 MMOL/L (ref 3.5–5.3)
PROT SERPL-MCNC: 6.6 G/DL (ref 6.1–8.1)
RBC # BLD AUTO: 4.68 MILLION/UL (ref 4.2–5.8)
SL AMB EGFR AFRICAN AMERICAN: 53 ML/MIN/1.73M2
SL AMB EGFR NON AFRICAN AMERICAN: 46 ML/MIN/1.73M2
SODIUM SERPL-SCNC: 141 MMOL/L (ref 135–146)
WBC # BLD AUTO: 7.7 THOUSAND/UL (ref 3.8–10.8)

## 2018-10-16 ENCOUNTER — ANTICOAG VISIT (OUTPATIENT)
Dept: INTERNAL MEDICINE CLINIC | Facility: CLINIC | Age: 78
End: 2018-10-16

## 2018-10-16 ENCOUNTER — TELEPHONE (OUTPATIENT)
Dept: INTERNAL MEDICINE CLINIC | Facility: CLINIC | Age: 78
End: 2018-10-16

## 2018-11-07 ENCOUNTER — TELEPHONE (OUTPATIENT)
Dept: UROLOGY | Facility: AMBULATORY SURGERY CENTER | Age: 78
End: 2018-11-07

## 2018-11-07 DIAGNOSIS — R97.20 ELEVATED PSA: Primary | ICD-10-CM

## 2018-11-07 NOTE — TELEPHONE ENCOUNTER
Patient's wife is calling to see if there are any labs that he needs to have done for his appointment 12/03/18  If so she would like to have them mailed  Please advise

## 2018-11-07 NOTE — TELEPHONE ENCOUNTER
Spoke to pt's spouse and per Dr Troy Olvera last office note pt does need a PSA  While on the phone pt's spouse wanted to cancel the appt for 12/3 due to the fact Pt lives closer to the [place] office and would like to schedule there   Canceled appt and mailed script per request

## 2018-11-10 DIAGNOSIS — N13.9 BENIGN LOCALIZED HYPERPLASIA OF PROSTATE WITH URINARY OBSTRUCTION AND LOWER URINARY TRACT SYMPTOMS: Primary | ICD-10-CM

## 2018-11-10 DIAGNOSIS — N40.1 BENIGN LOCALIZED HYPERPLASIA OF PROSTATE WITH URINARY OBSTRUCTION AND LOWER URINARY TRACT SYMPTOMS: Primary | ICD-10-CM

## 2018-11-12 RX ORDER — FINASTERIDE 5 MG/1
TABLET, FILM COATED ORAL
Qty: 90 TABLET | Refills: 3 | OUTPATIENT
Start: 2018-11-12

## 2018-11-12 RX ORDER — FINASTERIDE 5 MG/1
5 TABLET, FILM COATED ORAL DAILY
Qty: 90 TABLET | Refills: 0 | Status: SHIPPED | OUTPATIENT
Start: 2018-11-12 | End: 2018-12-12 | Stop reason: SDUPTHER

## 2018-11-12 NOTE — TELEPHONE ENCOUNTER
Patient relocated to [facility] in [place], [place]  He is switching his care to Dr Margarita Esquivel in [place]  He knows to have his PSA drawn before the visit  Demographic and pharmacy information also updated

## 2018-11-13 ENCOUNTER — APPOINTMENT (OUTPATIENT)
Dept: LAB | Age: 78
End: 2018-11-13
Payer: COMMERCIAL

## 2018-11-13 DIAGNOSIS — R97.20 ELEVATED PSA: ICD-10-CM

## 2018-11-13 LAB — PSA SERPL-MCNC: 2.1 NG/ML (ref 0–4)

## 2018-11-13 PROCEDURE — 84153 ASSAY OF PSA TOTAL: CPT

## 2018-12-12 ENCOUNTER — OFFICE VISIT (OUTPATIENT)
Dept: UROLOGY | Facility: AMBULATORY SURGERY CENTER | Age: 78
End: 2018-12-12
Payer: COMMERCIAL

## 2018-12-12 VITALS
HEIGHT: 68 IN | HEART RATE: 66 BPM | WEIGHT: 138 LBS | BODY MASS INDEX: 20.92 KG/M2 | SYSTOLIC BLOOD PRESSURE: 118 MMHG | DIASTOLIC BLOOD PRESSURE: 74 MMHG

## 2018-12-12 DIAGNOSIS — N13.9 BENIGN LOCALIZED HYPERPLASIA OF PROSTATE WITH URINARY OBSTRUCTION AND LOWER URINARY TRACT SYMPTOMS: ICD-10-CM

## 2018-12-12 DIAGNOSIS — N40.1 BENIGN LOCALIZED HYPERPLASIA OF PROSTATE WITH URINARY OBSTRUCTION AND LOWER URINARY TRACT SYMPTOMS: ICD-10-CM

## 2018-12-12 PROCEDURE — 4040F PNEUMOC VAC/ADMIN/RCVD: CPT | Performed by: UROLOGY

## 2018-12-12 PROCEDURE — 99213 OFFICE O/P EST LOW 20 MIN: CPT | Performed by: UROLOGY

## 2018-12-12 RX ORDER — FINASTERIDE 5 MG/1
5 TABLET, FILM COATED ORAL DAILY
Qty: 90 TABLET | Refills: 3 | Status: SHIPPED | OUTPATIENT
Start: 2018-12-12 | End: 2019-03-13 | Stop reason: SDUPTHER

## 2018-12-12 NOTE — PROGRESS NOTES
Assessment/Plan:    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  Finasteride was renewed  We discussed that at his age he does not need any further PSA screening  His exam was benign  He will follow up in 1 year  Diagnoses and all orders for this visit:    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  -     finasteride (PROSCAR) 5 mg tablet; Take 1 tablet (5 mg total) by mouth daily          Total visit time was 15 minutes of which over 50% was spent on counseling  Subjective:     Patient ID: Yareli Mata is a 77 y o  male    69-year-old male presents for follow-up of BPH and elevated PSA  The patient is transferring care from my partner due to closer location to this office  The patient has been on finasteride for a long time and is doing well with this medication  His urinary symptoms are well controlled  He has never had a prostate biopsy  His PSA was recently checked and found to be 2 1  He denies any family history of prostate cancer  He has no other complaints  The following portions of the patient's history were reviewed and updated as appropriate: allergies, current medications, past family history, past medical history, past social history, past surgical history and problem list     Review of Systems   Constitutional: Negative  HENT: Negative  Eyes: Negative  Respiratory: Negative  Cardiovascular: Negative  Gastrointestinal: Negative  Endocrine: Negative  Genitourinary:        As noted per HPI   Musculoskeletal: Negative  Skin: Negative  Allergic/Immunologic: Negative  Neurological: Negative  Hematological: Negative  Psychiatric/Behavioral: Negative  AUA SYMPTOM SCORE      Most Recent Value   AUA SYMPTOM SCORE   How often have you had a sensation of not emptying your bladder completely after you finished urinating?   0   How often have you had to urinate again less than two hours after you finished urinating? 0   How often have you found you stopped and started again several times when you urinate?  0   How often have you found it difficult to postpone urination? 0   How often have you had a weak urinary stream?  0   How often have you had to push or strain to begin urination? 0   How many times did you most typically get up to urinate from the time you went to bed at night until the time you got up in the morning? 1   Quality of Life: If you were to spend the rest of your life with your urinary condition just the way it is now, how would you feel about that?  0   AUA SYMPTOM SCORE  1          Urinary Incontinence Screening      Most Recent Value   Urinary Incontinence   Urinary Incontinence? No   Incomplete emptying? No   Urinary frequency? No   Urinary urgency? No   Urinary hesitancy? No   Dysuria (painful difficult urination)? No   Nocturia (waking up to use the bathroom)? Yes [1 time]   Straining (having to push to go)? No   Weak stream?  No   Intermittent stream?  No   Post void dribbling? No          Objective:    Physical Exam   Constitutional: He is oriented to person, place, and time  He appears well-developed and well-nourished  Neck: Normal range of motion  Cardiovascular: Intact distal pulses  Pulmonary/Chest: Effort normal    Abdominal: Soft  Bowel sounds are normal  He exhibits no distension and no mass  There is no tenderness  There is no rebound and no guarding  Genitourinary: Rectum normal and penis normal    Genitourinary Comments: Prostate 40 g, smooth, no nodules, nontender   Musculoskeletal: Normal range of motion  Neurological: He is alert and oriented to person, place, and time  Skin: Skin is warm and dry  Psychiatric: He has a normal mood and affect  Vitals reviewed          Results  Lab Results   Component Value Date    PSA 2 1 11/13/2018     Lab Results   Component Value Date CALCIUM 10 0 10/05/2018     09/12/2017    K 5 0 10/05/2018    CO2 34 (H) 10/05/2018     10/05/2018    BUN 25 10/05/2018    CREATININE 1 43 (H) 09/12/2017     Lab Results   Component Value Date    WBC 7 7 10/05/2018    HGB 15 0 10/05/2018    HCT 44 3 10/05/2018    MCV 94 7 10/05/2018     10/05/2018       No results found for this or any previous visit (from the past 1 hour(s)) ]

## 2018-12-12 NOTE — ASSESSMENT & PLAN NOTE
Finasteride was renewed  We discussed that at his age he does not need any further PSA screening  His exam was benign  He will follow up in 1 year

## 2018-12-14 DIAGNOSIS — I10 HYPERTENSION, UNSPECIFIED TYPE: Primary | ICD-10-CM

## 2018-12-14 RX ORDER — AMLODIPINE BESYLATE 5 MG/1
TABLET ORAL
Qty: 90 TABLET | Refills: 1 | Status: SHIPPED | OUTPATIENT
Start: 2018-12-14 | End: 2019-01-02 | Stop reason: ALTCHOICE

## 2019-01-02 ENCOUNTER — OFFICE VISIT (OUTPATIENT)
Dept: INTERNAL MEDICINE CLINIC | Facility: CLINIC | Age: 79
End: 2019-01-02
Payer: COMMERCIAL

## 2019-01-02 VITALS
HEART RATE: 62 BPM | TEMPERATURE: 97.8 F | WEIGHT: 141 LBS | OXYGEN SATURATION: 95 % | HEIGHT: 68 IN | BODY MASS INDEX: 21.37 KG/M2

## 2019-01-02 DIAGNOSIS — Z00.00 MEDICARE ANNUAL WELLNESS VISIT, SUBSEQUENT: Primary | ICD-10-CM

## 2019-01-02 PROCEDURE — 1160F RVW MEDS BY RX/DR IN RCRD: CPT | Performed by: INTERNAL MEDICINE

## 2019-01-02 PROCEDURE — 99213 OFFICE O/P EST LOW 20 MIN: CPT | Performed by: INTERNAL MEDICINE

## 2019-01-02 PROCEDURE — 1125F AMNT PAIN NOTED PAIN PRSNT: CPT | Performed by: INTERNAL MEDICINE

## 2019-01-02 PROCEDURE — 1170F FXNL STATUS ASSESSED: CPT | Performed by: INTERNAL MEDICINE

## 2019-01-02 PROCEDURE — G0439 PPPS, SUBSEQ VISIT: HCPCS | Performed by: INTERNAL MEDICINE

## 2019-01-02 NOTE — PROGRESS NOTES
Assessment and Plan:    Problem List Items Addressed This Visit     None      Visit Diagnoses     Medicare annual wellness visit, subsequent    -  Primary        Health Maintenance Due   Topic Date Due    Medicare Annual Wellness Visit (AWV)  1940    DTaP,Tdap,and Td Vaccines (1 - Tdap) 10/11/1960         HPI:  Aline Ortiz is a 77 y o  male here for his Subsequent Wellness Visit  Patient Active Problem List   Diagnosis    Atherosclerosis of coronary artery of native heart    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    Chronic renal disease, stage 3, moderately decreased glomerular filtration rate between 30-59 mL/min/1 73 square meter ([address] )    Coronary artery disease involving native coronary artery of native heart without angina pectoris    Essential hypertension    Hyperlipidemia    Hypertension    Raynaud's syndrome without gangrene    Left hip pain    Subconjunctival hemorrhage of right eye    It band syndrome, left     Past Medical History:   Diagnosis Date    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     Elevated PSA     R   11/16/17     Past Surgical History:   Procedure Laterality Date    INGUINAL HERNIA REPAIR       Family History   Problem Relation Age of Onset    COPD Mother     Heart failure Mother     Heart attack Father      History   Smoking Status    Never Smoker   Smokeless Tobacco    Never Used     History   Alcohol Use No      History   Drug Use No       Current Outpatient Prescriptions   Medication Sig Dispense Refill    amLODIPine (NORVASC) 2 5 mg tablet Take 2 5 mg by mouth      Apoaequorin (PREVAGEN) 10 MG CAPS Take by mouth      aspirin (ECOTRIN LOW STRENGTH) 81 mg EC tablet Take 81 mg by mouth      Biotin w/ Vitamins C & E (HAIR/SKIN/NAILS) 1250-7 5-7 5 MCG-MG-UNT CHEW Chew      finasteride (PROSCAR) 5 mg tablet Take 1 tablet (5 mg total) by mouth daily 90 tablet 3  hydrochlorothiazide (HYDRODIURIL) 25 mg tablet TAKE 1 TABLET (25 MG TOTAL) BY MOUTH DAILY  3    Multiple Vitamins-Minerals (MULTI-VITAMIN/MINERALS PO) Take by mouth      rosuvastatin (CRESTOR) 5 mg tablet TAKE 1 TABLET (5 MG TOTAL) BY MOUTH NIGHTLY   Magnesium 250 MG TABS Take by mouth       No current facility-administered medications for this visit  Allergies   Allergen Reactions    Other      Other reaction(s): itchy watery eyes  Other reaction(s): itchy watery eyes     Immunization History   Administered Date(s) Administered    Influenza Split High Dose Preservative Free IM 12/03/2013, 10/28/2014, 10/21/2015, 10/28/2016, 11/21/2017    Influenza TIV (IM) 10/17/2007, 10/29/2008, 10/21/2009, 10/18/2010, 11/21/2011    Influenza, high dose seasonal 0 5 mL 10/05/2018    Pneumococcal Conjugate 13-Valent 05/06/2016    Pneumococcal Polysaccharide PPV23 01/06/2006    Zoster 11/27/2007       Patient Care Team:  Ramila Mueller MD as PCP - General  Conor Arrington MD    Medicare Screening Tests and Risk Assessments:  Yojana Helton is here for his Subsequent Wellness visit  Health Risk Assessment:  Patient rates overall health as excellent  Patient feels that their physical health rating is Same  Eyesight was rated as Same  Hearing was rated as Same  Patient feels that their emotional and mental health rating is Much better  Pain experienced by patient in the last 7 days has been Some  Patient's pain rating has been 3/10  Emotional/Mental Health:  Patient has been feeling nervous/anxious  PHQ-9 Depression Screening:    Frequency of the following problems over the past two weeks:      1  Little interest or pleasure in doing things: 0 - not at all      2  Feeling down, depressed, or hopeless: 0 - not at all  PHQ-2 Score: 0          Broken Bones/Falls:     Fall Risk Assessment:    In the past year, patient has experienced: No history of falling in past year          Bladder/Bowel:  Patient has not leaked urine accidently in the last six months  Patient reports no loss of bowel control  Immunizations:  Patient has had a flu vaccination within the last year  Patient has received a pneumonia shot  Patient has not received a shingles shot  Patient has not received tetanus/diphtheria shot  Home Safety:  Patient does not have trouble with stairs inside or outside of their home  Patient currently reports that there are no safety hazards present in home, working smoke alarms, working carbon monoxide detectors  Preventative Screenings:   no prostate cancer screen performed, no colon cancer screen completed, cholesterol screen completed, glaucoma eye exam completed,     Nutrition:  Current diet: Limited junk food and Regular with servings of the following:    Medications:  Patient is currently taking over-the-counter supplements  Patient is able to manage medications  Lifestyle Choices:  Patient reports no tobacco use  Patient has not smoked or used tobacco in the past   Patient reports alcohol use  Activities of Daily Living:  Can get out of bed by his or her self, able to dress self, able to make own meals, able to do own shopping, able to bathe self, can do own laundry/housekeeping, can manage own money, pay bills and track expenses    Previous Hospitalizations:  No hospitalization or ED visit in past 12 months        Advanced Directives:  Patient has decided on a power of   Patient has spoken to designated power of   Patient has completed advanced directive

## 2019-01-02 NOTE — PROGRESS NOTES
Assessment/Plan:    No problem-specific Assessment & Plan notes found for this encounter  There are no diagnoses linked to this encounter  Subjective:      Patient ID: Yan Rider is a 77 y o  male  HPI        Shaan Castorena is here today for an unscheduled visit accompanied by his wife  th he does have a habit of forcefully blowing his nose especially in the shower e cursor jumps all over making dictation very frustrating  Apparently he showed up for stress echo appropriately ordered by his cardiologist Dr Raya Weir    While being hooked up he mention to the technician that he had recently been in the shower and coughed very vigorously a producing some sputum tinged with blood the procedure was aborted and he was told to see his personal physician for further evaluation not a smoker he has had no respiratory complaints  he believes that the bloody material that he expectorated probably came from his nose and sinuses this issue occurred 1 time only 2 weeks ago has not recurred    The following portions of the patient's history were reviewed and updated as appropriate: allergies, current medications, past family history, past medical history, past social history, past surgical history and problem list w    Review of Systems      Objective:      Pulse 62   Temp 97 8 °F (36 6 °C) (Tympanic)   Ht 5' 8" (1 727 m)   Wt 64 kg (141 lb)   SpO2 95%   BMI 21 44 kg/m²          Physical Exam  heart medial wall is a little bit of excoriated and rale there is no fresh bleeding the remainder of the ENT exam is unremarkable ungs are unremarkable the left nasal passage pears comfortable well and in no distress his pressure is 110/80 the  I told done the only way to be perfectly sure about the origin of the blood would be to do a CT of the chest and have him have a upper airway endoscopy done by an otolaryngologist he does not wish indicated h to proceed with this but does agree that if the bloody material recurs he will call me immediately and we will proceed  His wife concurs   I see no reason why he cannot proceed with a stress echo at clarifying his cardiac status seems to be indicated and appropriate

## 2019-03-13 ENCOUNTER — OFFICE VISIT (OUTPATIENT)
Dept: INTERNAL MEDICINE CLINIC | Facility: CLINIC | Age: 79
End: 2019-03-13
Payer: COMMERCIAL

## 2019-03-13 DIAGNOSIS — E78.2 MIXED HYPERLIPIDEMIA: ICD-10-CM

## 2019-03-13 DIAGNOSIS — I10 ESSENTIAL HYPERTENSION: Primary | ICD-10-CM

## 2019-03-13 DIAGNOSIS — Z13.29 SCREENING FOR THYROID DISORDER: ICD-10-CM

## 2019-03-13 DIAGNOSIS — Z11.59 ENCOUNTER FOR HEPATITIS C SCREENING TEST FOR LOW RISK PATIENT: ICD-10-CM

## 2019-03-13 DIAGNOSIS — N13.9 BENIGN LOCALIZED HYPERPLASIA OF PROSTATE WITH URINARY OBSTRUCTION AND LOWER URINARY TRACT SYMPTOMS: ICD-10-CM

## 2019-03-13 DIAGNOSIS — N40.1 BENIGN LOCALIZED HYPERPLASIA OF PROSTATE WITH URINARY OBSTRUCTION AND LOWER URINARY TRACT SYMPTOMS: ICD-10-CM

## 2019-03-13 PROCEDURE — 3075F SYST BP GE 130 - 139MM HG: CPT | Performed by: INTERNAL MEDICINE

## 2019-03-13 PROCEDURE — 99214 OFFICE O/P EST MOD 30 MIN: CPT | Performed by: INTERNAL MEDICINE

## 2019-03-13 PROCEDURE — 3079F DIAST BP 80-89 MM HG: CPT | Performed by: INTERNAL MEDICINE

## 2019-03-13 NOTE — PROGRESS NOTES
Assessment/Plan:    No problem-specific Assessment & Plan notes found for this encounter  Diagnoses and all orders for this visit:    Encounter for hepatitis C screening test for low risk patient  -     Hepatitis C antibody; Future    Essential hypertension  -     Comprehensive metabolic panel; Future  -     CBC and differential; Future  -     UA w Reflex to Microscopic w Reflex to Culture    Mixed hyperlipidemia  -     Comprehensive metabolic panel; Future  -     CBC and differential; Future  -     Lipid panel; Future  -     UA w Reflex to Microscopic w Reflex to Culture    Screening for thyroid disorder  -     TSH, 3rd generation; Future    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  -     finasteride (PROSCAR) 5 mg tablet; Take 1 tablet (5 mg total) by mouth daily        Subjective:      Patient ID: Saloni Ventura is a 77 y o  male  HPI    Nikhil Vail is here today accompanied by his wife  He generally feels well he and his wife have moved to [facility] in [facility] him he continues to see his cardiologist Dr Freya Mercedes the  Is up-to-date with the urologist he had some persistent pain in his left hip which was has been intermittent and for this reason he saw Dr Alnanah Hogan previously he has had imaging studies of the spine and hip which were unremarkable bursitis and/or an eye 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 and he was recommended to begin physical therapy which she has not done  At this point his hip pain is intermittent sometimes he feels well sometimes it is uncomfortable when it is uncomfortable he treats this with rubbing it with Vik-Weeks which seems to help he is here today to however did discuss whether not further treatment is indicated    He has questions about the new shingles vaccine and also questions about hepatitis C testing when he saw the urologist he says he was not given a prescription for finasteride      The following portions of the patient's history were reviewed and updated as appropriate: allergies, current medications, past family history, past medical history, past social history, past surgical history and problem list     Review of Systems    He really has no complaints today except for the hip  Objective:      /80   Pulse 61   Temp 98 °F (36 7 °C) (Tympanic)   Ht 5' 8" (1 727 m)   Wt 63 9 kg (140 lb 12 8 oz)   SpO2 98%   BMI 21 41 kg/m²          Physical Exam  he appears well in no distress he is very nicely dressed his pressure is 130/80 the heart lungs unremarkable I did not examine his hip  We discussed hepatitis C serology cool testing his hip pain and at this point the main problem is Guzman Mueller is very indecisive his for how far he once did to proceed with evaluation and treatment  He has days when the hip and leg pain or very uncomfortable and days where he is asymptomatic  This point my best recommendation was to use a an occasional over-the-counter NSAID and to consider strongly seeing Dr  Headache med again possibly for a local injection and then certainly strongly recommending that he began physical therapy he is he will be due for some lab studies the next couple of months we gave him a slip for appropriate lab studies and will do hepatitis C serology just to settle the issue no changes are made in his medications    We also gave him information sheet about the new shingles vaccine

## 2019-03-14 VITALS
SYSTOLIC BLOOD PRESSURE: 130 MMHG | DIASTOLIC BLOOD PRESSURE: 80 MMHG | TEMPERATURE: 98 F | BODY MASS INDEX: 21.34 KG/M2 | HEART RATE: 61 BPM | WEIGHT: 140.8 LBS | OXYGEN SATURATION: 98 % | HEIGHT: 68 IN

## 2019-03-14 RX ORDER — FINASTERIDE 5 MG/1
5 TABLET, FILM COATED ORAL DAILY
Qty: 90 TABLET | Refills: 3 | Status: SHIPPED | OUTPATIENT
Start: 2019-03-14 | End: 2020-03-16

## 2019-06-04 DIAGNOSIS — I10 HYPERTENSION, UNSPECIFIED TYPE: ICD-10-CM

## 2019-06-04 RX ORDER — AMLODIPINE BESYLATE 5 MG/1
TABLET ORAL
Qty: 90 TABLET | Refills: 1 | Status: SHIPPED | OUTPATIENT
Start: 2019-06-04 | End: 2020-01-11

## 2019-07-30 LAB — HCV AB SER-ACNC: NEGATIVE

## 2019-08-08 ENCOUNTER — TELEPHONE (OUTPATIENT)
Dept: INTERNAL MEDICINE CLINIC | Facility: CLINIC | Age: 79
End: 2019-08-08

## 2019-09-18 ENCOUNTER — OFFICE VISIT (OUTPATIENT)
Dept: INTERNAL MEDICINE CLINIC | Facility: CLINIC | Age: 79
End: 2019-09-18
Payer: COMMERCIAL

## 2019-09-18 VITALS
WEIGHT: 138 LBS | TEMPERATURE: 98.3 F | RESPIRATION RATE: 15 BRPM | HEIGHT: 68 IN | BODY MASS INDEX: 20.92 KG/M2 | HEART RATE: 62 BPM

## 2019-09-18 DIAGNOSIS — I10 ESSENTIAL HYPERTENSION: Primary | ICD-10-CM

## 2019-09-18 DIAGNOSIS — F09 MILD COGNITIVE DISORDER: ICD-10-CM

## 2019-09-18 PROCEDURE — 99214 OFFICE O/P EST MOD 30 MIN: CPT | Performed by: INTERNAL MEDICINE

## 2019-09-18 NOTE — PROGRESS NOTES
Assessment/Plan:    No problem-specific Assessment & Plan notes found for this encounter  There are no diagnoses linked to this encounter  Subjective:      Patient ID: Ana Rosa Daniel is a 77 y o  male      HPI [facility] is here today for visit accompanied by his wife St kermit barnes and he in back ER now residing in [address] [place] [address],[address] in [facility] they are planning to spend a month in [place] during the winter he continues under the care of Dr Ghassan Car his cardiologist he takes his medications correctly and feels well he and his wife feel that he has had a very subtle challenge to his cognition and he has been taking Prevagin  gin for couple of years which the family feels is helpful    The following portions of the patient's history were reviewed and updated as appropriate: allergies, current medications, past family history, past medical history, past social history, past surgical history and problem list     Review of Systems      Objective:      Pulse 62   Temp 98 3 °F (36 8 °C)   Resp 15   Ht 5' 8" (1 727 m)   Wt 62 6 kg (138 lb)   BMI 20 98 kg/m²          Physical Exam  [facility] is a nicely dressed interactive and intelligent his blood pressure is 120/82 the carotids are normal the lungs are clear cardiac examination today is unremarkable and benign he has regular rhythm physiologic rate no murmurs or gallops the abdomen is benign there is no edema he is quite stable apparently of private jet is 60 dollars a month for expensive the family would like to try something different and II believe that the donepezil 5 mg would be a reasonable alternative will supply a prescription in this regard will recommend the shingles vaccine we went over his lab studies will see him back in 6 months

## 2019-09-22 RX ORDER — DONEPEZIL HYDROCHLORIDE 5 MG/1
5 TABLET, ORALLY DISINTEGRATING ORAL
Qty: 90 TABLET | Refills: 1 | Status: SHIPPED | OUTPATIENT
Start: 2019-09-22 | End: 2020-03-31

## 2019-10-07 ENCOUNTER — DOCUMENTATION (OUTPATIENT)
Dept: INTERNAL MEDICINE CLINIC | Facility: CLINIC | Age: 79
End: 2019-10-07

## 2020-01-11 DIAGNOSIS — I10 HYPERTENSION, UNSPECIFIED TYPE: ICD-10-CM

## 2020-01-11 RX ORDER — AMLODIPINE BESYLATE 5 MG/1
TABLET ORAL
Qty: 90 TABLET | Refills: 0 | Status: SHIPPED | OUTPATIENT
Start: 2020-01-11 | End: 2020-04-06

## 2020-01-13 ENCOUNTER — OFFICE VISIT (OUTPATIENT)
Dept: UROLOGY | Facility: AMBULATORY SURGERY CENTER | Age: 80
End: 2020-01-13
Payer: COMMERCIAL

## 2020-01-13 VITALS
BODY MASS INDEX: 20.92 KG/M2 | SYSTOLIC BLOOD PRESSURE: 120 MMHG | HEIGHT: 68 IN | HEART RATE: 62 BPM | WEIGHT: 138 LBS | DIASTOLIC BLOOD PRESSURE: 82 MMHG

## 2020-01-13 DIAGNOSIS — N40.1 BENIGN LOCALIZED HYPERPLASIA OF PROSTATE WITH URINARY OBSTRUCTION AND LOWER URINARY TRACT SYMPTOMS: Primary | ICD-10-CM

## 2020-01-13 DIAGNOSIS — N13.9 BENIGN LOCALIZED HYPERPLASIA OF PROSTATE WITH URINARY OBSTRUCTION AND LOWER URINARY TRACT SYMPTOMS: Primary | ICD-10-CM

## 2020-01-13 PROCEDURE — 1160F RVW MEDS BY RX/DR IN RCRD: CPT | Performed by: NURSE PRACTITIONER

## 2020-01-13 PROCEDURE — 99213 OFFICE O/P EST LOW 20 MIN: CPT | Performed by: NURSE PRACTITIONER

## 2020-01-13 NOTE — PROGRESS NOTES
1/13/2020      Chief Complaint   Patient presents with    Benign Prostatic Hypertrophy     Assessment and Plan    [de-identified] y o  male managed by Dr Michelle Ferrell    1  Benign Prostatic Hyperplasia with lower urinary symptoms  · Continue with finasteride  · Call the office for concerning symptoms       History of Present Illness  Carmela Walker is a [de-identified] y o  male here for follow up evaluation of urinary symptoms secondary benign prostatic hyperplasia  Patient has a history of taking finasteride secondary to benign prostatic hyperplasia with good control of urinary symptoms  He currently has no lower urinary symptoms to complain of  He denies dysuria, hematuria, urinary frequency and urgency  He denies a family history of prostate cancer  He reports doing very well since his prior office visit  He reports getting up approximately 1-2 times a night to urinate  Review of Systems   Constitutional: Negative for chills and fever  Respiratory: Negative for cough and shortness of breath  Cardiovascular: Negative for chest pain  Gastrointestinal: Negative for abdominal distention, abdominal pain, blood in stool, nausea and vomiting  Genitourinary: Negative for difficulty urinating, dysuria, enuresis, flank pain, frequency, hematuria and urgency  Musculoskeletal: Negative for back pain  Skin: Negative for rash  Neurological: Negative for dizziness  Past Medical History  Past Medical History:   Diagnosis Date    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     Elevated PSA     R   11/16/17       Past Social History  Past Surgical History:   Procedure Laterality Date    INGUINAL HERNIA REPAIR       Social History     Tobacco Use   Smoking Status Never Smoker   Smokeless Tobacco Never Used       Past Family History  Family History   Problem Relation Age of Onset    COPD Mother     Heart failure Mother     Heart attack Father        Past Social history  Social History Socioeconomic History    Marital status: /Civil Union     Spouse name: Not on file    Number of children: Not on file    Years of education: Not on file    Highest education level: Not on file   Occupational History    Not on file   Social Needs    Financial resource strain: Not on file    Food insecurity:     Worry: Not on file     Inability: Not on file    Transportation needs:     Medical: Not on file     Non-medical: Not on file   Tobacco Use    Smoking status: Never Smoker    Smokeless tobacco: Never Used   Substance and Sexual Activity    Alcohol use: No    Drug use: No    Sexual activity: Not on file   Lifestyle    Physical activity:     Days per week: Not on file     Minutes per session: Not on file    Stress: Not on file   Relationships    Social connections:     Talks on phone: Not on file     Gets together: Not on file     Attends Jainism service: Not on file     Active member of club or organization: Not on file     Attends meetings of clubs or organizations: Not on file     Relationship status: Not on file    Intimate partner violence:     Fear of current or ex partner: Not on file     Emotionally abused: Not on file     Physically abused: Not on file     Forced sexual activity: Not on file   Other Topics Concern    Not on file   Social History Narrative    Not on file       Current Medications  Current Outpatient Medications   Medication Sig Dispense Refill    amLODIPine (NORVASC) 5 mg tablet TAKE 1 TABLET DAILY   90 tablet 0    aspirin (ECOTRIN LOW STRENGTH) 81 mg EC tablet Take 81 mg by mouth      Biotin w/ Vitamins C & E (HAIR/SKIN/NAILS) 1250-7 5-7 5 MCG-MG-UNT CHEW Chew      donepezil (ARICEPT ODT) 5 MG disintegrating tablet Take 1 tablet (5 mg total) by mouth daily at bedtime 90 tablet 1    finasteride (PROSCAR) 5 mg tablet Take 1 tablet (5 mg total) by mouth daily 90 tablet 3    hydrochlorothiazide (HYDRODIURIL) 25 mg tablet TAKE 1 TABLET (25 MG TOTAL) BY MOUTH DAILY  3    Magnesium 250 MG TABS Take by mouth      Multiple Vitamins-Minerals (MULTI-VITAMIN/MINERALS PO) Take by mouth      rosuvastatin (CRESTOR) 5 mg tablet TAKE 1 TABLET (5 MG TOTAL) BY MOUTH NIGHTLY   Apoaequorin (PREVAGEN) 10 MG CAPS Take by mouth       No current facility-administered medications for this visit  Allergies  Allergies   Allergen Reactions    Other      Other reaction(s): itchy watery eyes  Other reaction(s): itchy watery eyes         The following portions of the patient's history were reviewed and updated as appropriate: allergies, current medications, past medical history, past social history, past surgical history and problem list       Vitals  Vitals:    01/13/20 1053   BP: 120/82   BP Location: Left arm   Patient Position: Sitting   Cuff Size: Adult   Pulse: 62   Weight: 62 6 kg (138 lb)   Height: 5' 8" (1 727 m)           Physical Exam  Physical Exam   Constitutional: He is oriented to person, place, and time  He appears well-developed and well-nourished  No distress  Cardiovascular: Normal rate  Pulmonary/Chest: Effort normal and breath sounds normal  No respiratory distress  Abdominal: Soft  Musculoskeletal: Normal range of motion  Neurological: He is alert and oriented to person, place, and time  Skin: Skin is warm and dry  Psychiatric: He has a normal mood and affect  Vitals reviewed        Results  No results found for this or any previous visit (from the past 1 hour(s)) ]  Lab Results   Component Value Date    PSA 2 1 11/13/2018     Lab Results   Component Value Date    CALCIUM 10 0 10/05/2018     09/12/2017    K 5 0 10/05/2018    CO2 34 (H) 10/05/2018     10/05/2018    BUN 25 10/05/2018    CREATININE 1 45 (H) 10/05/2018     Lab Results   Component Value Date    WBC 7 7 10/05/2018    HGB 15 0 10/05/2018    HCT 44 3 10/05/2018    MCV 94 7 10/05/2018     10/05/2018       Orders  No orders of the defined types were placed in this encounter        NORMA Fisher

## 2020-03-16 DIAGNOSIS — N13.9 BENIGN LOCALIZED HYPERPLASIA OF PROSTATE WITH URINARY OBSTRUCTION AND LOWER URINARY TRACT SYMPTOMS: ICD-10-CM

## 2020-03-16 DIAGNOSIS — N40.1 BENIGN LOCALIZED HYPERPLASIA OF PROSTATE WITH URINARY OBSTRUCTION AND LOWER URINARY TRACT SYMPTOMS: ICD-10-CM

## 2020-03-16 RX ORDER — FINASTERIDE 5 MG/1
TABLET, FILM COATED ORAL
Qty: 90 TABLET | Refills: 3 | Status: SHIPPED | OUTPATIENT
Start: 2020-03-16 | End: 2021-03-20

## 2020-03-31 DIAGNOSIS — F09 MILD COGNITIVE DISORDER: ICD-10-CM

## 2020-03-31 RX ORDER — DONEPEZIL HYDROCHLORIDE 5 MG/1
TABLET, ORALLY DISINTEGRATING ORAL
Qty: 90 TABLET | Refills: 1 | Status: SHIPPED | OUTPATIENT
Start: 2020-03-31 | End: 2020-08-26 | Stop reason: ALTCHOICE

## 2020-04-05 DIAGNOSIS — I10 HYPERTENSION, UNSPECIFIED TYPE: ICD-10-CM

## 2020-04-06 RX ORDER — AMLODIPINE BESYLATE 5 MG/1
TABLET ORAL
Qty: 90 TABLET | Refills: 0 | Status: SHIPPED | OUTPATIENT
Start: 2020-04-06 | End: 2020-06-27

## 2020-06-27 DIAGNOSIS — I10 HYPERTENSION, UNSPECIFIED TYPE: ICD-10-CM

## 2020-06-27 RX ORDER — AMLODIPINE BESYLATE 5 MG/1
TABLET ORAL
Qty: 90 TABLET | Refills: 0 | Status: SHIPPED | OUTPATIENT
Start: 2020-06-27 | End: 2020-09-20

## 2020-08-01 PROBLEM — M79.605 LEFT LEG PAIN: Status: ACTIVE | Noted: 2018-12-04

## 2020-08-01 PROBLEM — J30.0 VASOMOTOR RHINITIS: Status: ACTIVE | Noted: 2020-08-01

## 2020-08-26 ENCOUNTER — OFFICE VISIT (OUTPATIENT)
Dept: INTERNAL MEDICINE CLINIC | Facility: CLINIC | Age: 80
End: 2020-08-26
Payer: COMMERCIAL

## 2020-08-26 DIAGNOSIS — E78.5 HYPERLIPIDEMIA, UNSPECIFIED HYPERLIPIDEMIA TYPE: ICD-10-CM

## 2020-08-26 DIAGNOSIS — F09 MILD COGNITIVE DISORDER: ICD-10-CM

## 2020-08-26 DIAGNOSIS — I10 HYPERTENSION, UNSPECIFIED TYPE: ICD-10-CM

## 2020-08-26 DIAGNOSIS — I25.10 CORONARY ARTERY DISEASE INVOLVING NATIVE CORONARY ARTERY OF NATIVE HEART WITHOUT ANGINA PECTORIS: Primary | ICD-10-CM

## 2020-08-26 DIAGNOSIS — Z00.00 MEDICARE ANNUAL WELLNESS VISIT, SUBSEQUENT: ICD-10-CM

## 2020-08-26 PROCEDURE — 99214 OFFICE O/P EST MOD 30 MIN: CPT | Performed by: INTERNAL MEDICINE

## 2020-08-26 PROCEDURE — 3074F SYST BP LT 130 MM HG: CPT | Performed by: INTERNAL MEDICINE

## 2020-08-26 PROCEDURE — 3725F SCREEN DEPRESSION PERFORMED: CPT | Performed by: INTERNAL MEDICINE

## 2020-08-26 PROCEDURE — 1036F TOBACCO NON-USER: CPT | Performed by: INTERNAL MEDICINE

## 2020-08-26 PROCEDURE — G0439 PPPS, SUBSEQ VISIT: HCPCS | Performed by: INTERNAL MEDICINE

## 2020-08-26 PROCEDURE — 1170F FXNL STATUS ASSESSED: CPT | Performed by: INTERNAL MEDICINE

## 2020-08-26 PROCEDURE — 1125F AMNT PAIN NOTED PAIN PRSNT: CPT | Performed by: INTERNAL MEDICINE

## 2020-08-26 PROCEDURE — 3079F DIAST BP 80-89 MM HG: CPT | Performed by: INTERNAL MEDICINE

## 2020-08-26 PROCEDURE — 1160F RVW MEDS BY RX/DR IN RCRD: CPT | Performed by: INTERNAL MEDICINE

## 2020-08-26 PROCEDURE — 4040F PNEUMOC VAC/ADMIN/RCVD: CPT | Performed by: INTERNAL MEDICINE

## 2020-08-26 NOTE — PROGRESS NOTES
Assessment and Plan:     Problem List Items Addressed This Visit     None      Visit Diagnoses     Medicare annual wellness visit, subsequent    -  Primary           Preventive health issues were discussed with patient, and age appropriate screening tests were ordered as noted in patient's After Visit Summary  Personalized health advice and appropriate referrals for health education or preventive services given if needed, as noted in patient's After Visit Summary  History of Present Illness:     Patient presents for Medicare Annual Wellness visit    Patient Care Team:  Jono Barry MD as PCP - General (Internal Medicine)  Rahel Hamilton MD     Problem List:     Patient Active Problem List   Diagnosis    Atherosclerosis of coronary artery of native heart    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    Chronic renal disease, stage 3, moderately decreased glomerular filtration rate between 30-59 mL/min/1 73 square meter ([address] )    Coronary artery disease involving native coronary artery of native heart without angina pectoris    Essential hypertension    Hyperlipidemia    Hypertension    Raynaud's syndrome without gangrene    Left hip pain    Subconjunctival hemorrhage of right eye    It band syndrome, left    History of malignant melanoma of skin    Left leg pain    Solar degeneration    Vasomotor rhinitis      Past Medical and Surgical History:     Past Medical History:   Diagnosis Date    Basal cell carcinoma     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     Elevated PSA     R   11/16/17    Wears glasses      Past Surgical History:   Procedure Laterality Date    COLONOSCOPY      INGUINAL HERNIA REPAIR      MOHS SURGERY      SKIN BIOPSY      basal cell     TONSILLECTOMY        Family History:     Family History   Problem Relation Age of Onset    COPD Mother     Heart failure Mother     Heart attack Father     Lung cancer Brother     No Known Problems Son     No Known Problems Daughter     No Known Problems Daughter       Social History:     E-Cigarette/Vaping    E-Cigarette Use Never User      E-Cigarette/Vaping Substances    Nicotine No     THC No     CBD No     Flavoring No     Other No     Unknown No      Social History     Socioeconomic History    Marital status: /Civil Union     Spouse name: Elisa Estes Number of children: 3    Years of education: None    Highest education level: None   Occupational History    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      Comment: retired    Social Needs    Financial resource strain: None    Food insecurity     Worry: None     Inability: None    Transportation needs     Medical: None     Non-medical: None   Tobacco Use    Smoking status: Never Smoker    Smokeless tobacco: Never Used   Substance and Sexual Activity    Alcohol use: No    Drug use: No    Sexual activity: None   Lifestyle    Physical activity     Days per week: 7 days     Minutes per session: 30 min    Stress: None   Relationships    Social connections     Talks on phone: None     Gets together: None     Attends Pentecostalism service: None     Active member of club or organization: None     Attends meetings of clubs or organizations: None     Relationship status: None    Intimate partner violence     Fear of current or ex partner: None     Emotionally abused: None     Physically abused: None     Forced sexual activity: None   Other Topics Concern    None   Social History Narrative    Who lives in your home: wife     What type of home do you live in: Other skilled nursing home [facility]     Age of your home: 2 5 yrs the portion where they live     How long have you been living there: 2017    Type of heat: Forced hot air    Type of fuel: Electric    What type of sadia is in your bedroom: Area rugs and Hardwood floor    Do you have the following in or near your home:    Air products: Central air    Pests: None    Pets: None    Are pets allowed in bedroom: N/A    Open fields, wooded areas nearby: Open fields    Basement: None    Exposure to second hand smoke: No        Habits:    Caffeine: soda occasionally-hot tea 1-2 cups daily     Chocolate: rarely     Other:      Medications and Allergies:     Current Outpatient Medications   Medication Sig Dispense Refill    amLODIPine (NORVASC) 5 mg tablet TAKE 1 TABLET BY MOUTH EVERY DAY 90 tablet 0    aspirin (ECOTRIN LOW STRENGTH) 81 mg EC tablet Take 81 mg by mouth      azelastine (ASTELIN) 0 1 % nasal spray 1 spray into each nostril 2 (two) times a day Use in each nostril as directed 1 Bottle 12    b complex vitamins tablet Take 1 tablet by mouth daily      Biotin w/ Vitamins C & E (HAIR/SKIN/NAILS) 1250-7 5-7 5 MCG-MG-UNT CHEW Chew      Coenzyme Q10 (CO Q10) 100 MG CAPS Take by mouth      donepezil (ARICEPT ODT) 5 MG disintegrating tablet DISSOLVE 1 TABLET BY MOUTH DAILY AT BEDTIME 90 tablet 1    finasteride (PROSCAR) 5 mg tablet TAKE 1 TABLET BY MOUTH EVERY DAY 90 tablet 3    hydrochlorothiazide (HYDRODIURIL) 25 mg tablet TAKE 1 TABLET (25 MG TOTAL) BY MOUTH DAILY  3    Magnesium 250 MG TABS Take by mouth      Multiple Vitamins-Minerals (MULTI-VITAMIN/MINERALS PO) Take by mouth      rosuvastatin (CRESTOR) 5 mg tablet TAKE 1 TABLET (5 MG TOTAL) BY MOUTH NIGHTLY  No current facility-administered medications for this visit        Allergies   Allergen Reactions    Other      Other reaction(s): itchy watery eyes  Other reaction(s): itchy watery eyes      Immunizations:     Immunization History   Administered Date(s) Administered    Influenza Split High Dose Preservative Free IM 12/03/2013, 10/28/2014, 10/21/2015, 10/28/2016, 11/21/2017, 10/02/2019    Influenza TIV (IM) 10/17/2007, 10/29/2008, 10/21/2009, 10/18/2010, 11/21/2011    Influenza, high dose seasonal 0 7 mL 10/05/2018, 10/02/2019    Pneumococcal Conjugate 13-Valent 05/06/2016    Pneumococcal Polysaccharide PPV23 01/06/2006    Zoster 11/27/2007    Zoster Vaccine Recombinant 10/16/2019, 01/08/2020      Health Maintenance:         Topic Date Due    Hepatitis C Screening  Completed         Topic Date Due    DTaP,Tdap,and Td Vaccines (1 - Tdap) 10[birth year]/1960    Influenza Vaccine  07/01/2020      Medicare Health Risk Assessment:     Pulse 64   Temp 97 7 °F (36 5 °C) (Tympanic)   Ht 5' 8" (1 727 m)   Wt 66 7 kg (147 lb)   SpO2 98%   BMI 22 35 kg/m²      Jose David Do is here for his Subsequent Wellness visit  Health Risk Assessment:   Patient rates overall health as excellent  Patient feels that their physical health rating is Same  Eyesight was rated as Same  Hearing was rated as Same  Patient feels that their emotional and mental health rating is Much better  Pain experienced in the last 7 days has been some  Patient's pain rating has been 4/10  Depression Screening:   PHQ-2 Score: 0      Fall Risk Screening: In the past year, patient has experienced: no history of falling in past year      Home Safety:  Patient does not have trouble with stairs inside or outside of their home  Patient has working smoke alarms and has no working carbon monoxide detector  Nutrition:   Current diet is Limited junk food and Regular  Medications:   Patient is currently taking over-the-counter supplements  OTC medications include: see medication list  Patient is able to manage medications  Activities of Daily Living (ADLs)/Instrumental Activities of Daily Living (IADLs):   Walk and transfer into and out of bed and chair?: Yes  Dress and groom yourself?: Yes    Bathe or shower yourself?: Yes    Feed yourself?  Yes  Do your laundry/housekeeping?: Yes  Manage your money, pay your bills and track your expenses?: Yes  Make your own meals?: Yes    Do your own shopping?: Yes    Previous Hospitalizations:   Any hospitalizations or ED visits within the last 12 months?: No      Advance Care Planning:   Living will: Yes    Advanced directive: Yes      Cognitive Screening:   Provider or family/friend/caregiver concerned regarding cognition?: No    PREVENTIVE SCREENINGS      Cardiovascular Screening:    General: Screening Not Indicated and History Lipid Disorder      Prostate Cancer Screening:    General: Screening Not Indicated      Lung Cancer Screening:     General: Screening Not Indicated      Hepatitis C Screening:    General: Screening Current      Nicholas Santizo MD

## 2020-08-26 NOTE — PROGRESS NOTES
Assessment/Plan:     Diagnoses and all orders for this visit:    Coronary artery disease involving native coronary artery of native heart without angina pectoris  -     CBC and differential; Future  -     Comprehensive metabolic panel; Future  -     TSH, 3rd generation; Future  -     Ferritin; Future  -     URINALYSIS, REFLEX (REFL)  -     Lipid panel; Future  -     Vitamin B12; Future    Medicare annual wellness visit, subsequent    Hypertension, unspecified type  -     CBC and differential; Future  -     Comprehensive metabolic panel; Future  -     TSH, 3rd generation; Future  -     Ferritin; Future  -     URINALYSIS, REFLEX (REFL)  -     Lipid panel; Future  -     Vitamin B12; Future    Hyperlipidemia, unspecified hyperlipidemia type  -     CBC and differential; Future  -     Comprehensive metabolic panel; Future  -     TSH, 3rd generation; Future  -     Ferritin; Future  -     URINALYSIS, REFLEX (REFL)  -     Lipid panel; Future  -     Vitamin B12; Future    Mild cognitive disorder  -     donepezil (ARICEPT) 10 mg tablet; Take 1 tablet (10 mg total) by mouth daily at bedtime          Subjective:      Patient ID: Ankita Gama is a [de-identified] y o  male  [facility] File is here today accompanied by his wife for visit their living a [address] [place] [address],[address] they have been adhering to all the COVID precautions  He will be seeing Dr Ruben Valle his cardiologist in the near future he has an appointment to see a dermatologist in their future for a Mohs treatment of a lesion just left of the base of the nose    He is also under the current care of a urologist   He feels well takes his medications correctly and he and his wife both feel that the 5 mg of Aricept that we prescribed per previously has made a difference in his outlook in mood and memory    The following portions of the patient's history were reviewed and updated as appropriate: allergies, current medications, past family history, past medical history, past social history, past surgical history and problem list     Review of Systems  noncontributory    Objective:      /80   Pulse 64   Temp 97 7 °F (36 5 °C) (Tympanic)   Ht 5' 8" (1 727 m)   Wt 66 7 kg (147 lb)   SpO2 98%   BMI 22 35 kg/m²    BP Readings from Last 3 Encounters:   08/26/20 120/80   07/29/20 126/84   01/13/20 120/82      Wt Readings from Last 3 Encounters:   08/26/20 66 7 kg (147 lb)   07/29/20 62 6 kg (138 lb)   01/13/20 62 6 kg (138 lb)      BMI: Estimated body mass index is 22 35 kg/m² as calculated from the following:    Height as of this encounter: 5' 8" (1 727 m)  Weight as of this encounter: 66 7 kg (147 lb)  BSA: Estimated body surface area is 1 79 meters squared as calculated from the following:    Height as of this encounter: 5' 8" (1 727 m)  Weight as of this encounter: 66 7 kg (147 lb)  Physical Exam  done is nicely dressed and meticulously groomed and very dap for his pressure is 120/80 the carotids are full and equal without bruits the lungs are clear cardiac examination reveals regular rhythm rate in the mid 70s no murmurs or gallops the abdomen is soft and no palpable organ enlargement or masses extremities are free of edema  We did notice a what appears to be a surgical defect just above the inner canthus of the left eye we are going to increase his Aricept to 10 mg Jeannette Negar is enthusiastic about this because he complains occasionally of being cold will order some routine lab studies again which will include a TSH and a ferritin    Will see him back in 6 months we return to his other physicians is certainly encouraged      Current Outpatient Medications:     amLODIPine (NORVASC) 5 mg tablet, TAKE 1 TABLET BY MOUTH EVERY DAY, Disp: 90 tablet, Rfl: 0    aspirin (ECOTRIN LOW STRENGTH) 81 mg EC tablet, Take 81 mg by mouth, Disp: , Rfl:     azelastine (ASTELIN) 0 1 % nasal spray, 1 spray into each nostril 2 (two) times a day Use in each nostril as directed, Disp: 1 Bottle, Rfl: 12    b complex vitamins tablet, Take 1 tablet by mouth daily, Disp: , Rfl:     Biotin w/ Vitamins C & E (HAIR/SKIN/NAILS) 1250-7 5-7 5 MCG-MG-UNT CHEW, Chew, Disp: , Rfl:     Coenzyme Q10 (CO Q10) 100 MG CAPS, Take by mouth, Disp: , Rfl:     finasteride (PROSCAR) 5 mg tablet, TAKE 1 TABLET BY MOUTH EVERY DAY, Disp: 90 tablet, Rfl: 3    hydrochlorothiazide (HYDRODIURIL) 25 mg tablet, TAKE 1 TABLET (25 MG TOTAL) BY MOUTH DAILY  , Disp: , Rfl: 3    Magnesium 250 MG TABS, Take by mouth, Disp: , Rfl:     Multiple Vitamins-Minerals (MULTI-VITAMIN/MINERALS PO), Take by mouth, Disp: , Rfl:     rosuvastatin (CRESTOR) 5 mg tablet, TAKE 1 TABLET (5 MG TOTAL) BY MOUTH NIGHTLY , Disp: , Rfl:     This note was completed in part utilizing [employer] Direct Software  Grammatical errors, random word insertions, spelling mistakes, and incomplete sentences can be an occasional consequence of this system secondary to software limitations, ambient noise, and hardware issues  If you have any question or concerns about the content, text, or information contained within the body of this dictation, please contact the provider for clarification

## 2020-08-26 NOTE — PATIENT INSTRUCTIONS
Medicare Preventive Visit Patient Instructions  Thank you for completing your Welcome to Medicare Visit or Medicare Annual Wellness Visit today  Your next wellness visit will be due in one year (8/26/2021)  The screening/preventive services that you may require over the next 5-10 years are detailed below  Some tests may not apply to you based off risk factors and/or age  Screening tests ordered at today's visit but not completed yet may show as past due  Also, please note that scanned in results may not display below  Preventive Screenings:  Service Recommendations Previous Testing/Comments   Colorectal Cancer Screening  · Colonoscopy    · Fecal Occult Blood Test (FOBT)/Fecal Immunochemical Test (FIT)  · Fecal DNA/Cologuard Test  · Flexible Sigmoidoscopy Age: 54-65 years old   Colonoscopy: every 10 years (May be performed more frequently if at higher risk)  OR  FOBT/FIT: every 1 year  OR  Cologuard: every 3 years  OR  Sigmoidoscopy: every 5 years  Screening may be recommended earlier than age 48 if at higher risk for colorectal cancer  Also, an individualized decision between you and your healthcare provider will decide whether screening between the ages of 74-80 would be appropriate   Colonoscopy: Not on file  FOBT/FIT: Not on file  Cologuard: Not on file  Sigmoidoscopy: Not on file         Prostate Cancer Screening Individualized decision between patient and health care provider in men between ages of 53-78   Medicare will cover every 12 months beginning on the day after your 50th birthday PSA: 2 1 ng/mL          Hepatitis C Screening Once for adults born between 1945 and 1965  More frequently in patients at high risk for Hepatitis C Hep C Antibody: 07/30/2019       Diabetes Screening 1-2 times per year if you're at risk for diabetes or have pre-diabetes Fasting glucose: No results in last 5 years   A1C: No results in last 5 years       Cholesterol Screening Once every 5 years if you don't have a lipid disorder  May order more often based on risk factors  Lipid panel: 07/30/2019          Other Preventive Screenings Covered by Medicare:  1  Abdominal Aortic Aneurysm (AAA) Screening: covered once if your at risk  You're considered to be at risk if you have a family history of AAA or a male between the age of 73-68 who smoking at least 100 cigarettes in your lifetime  2  Lung Cancer Screening: covers low dose CT scan once per year if you meet all of the following conditions: (1) Age 50-69; (2) No signs or symptoms of lung cancer; (3) Current smoker or have quit smoking within the last 15 years; (4) You have a tobacco smoking history of at least 30 pack years (packs per day x number of years you smoked); (5) You get a written order from a healthcare provider  3  Glaucoma Screening: covered annually if you're considered high risk: (1) You have diabetes OR (2) Family history of glaucoma OR (3)  aged 48 and older OR (3)  American aged 72 and older  3  Osteoporosis Screening: covered every 2 years if you meet one of the following conditions: (1) Have a vertebral abnormality; (2) On glucocorticoid therapy for more than 3 months; (3) Have primary hyperparathyroidism; (4) On osteoporosis medications and need to assess response to drug therapy  5  HIV Screening: covered annually if you're between the age of 12-76  Also covered annually if you are younger than 13 and older than 72 with risk factors for HIV infection  For pregnant patients, it is covered up to 3 times per pregnancy      Immunizations:  Immunization Recommendations   Influenza Vaccine Annual influenza vaccination during flu season is recommended for all persons aged >= 6 months who do not have contraindications   Pneumococcal Vaccine (Prevnar and Pneumovax)  * Prevnar = PCV13  * Pneumovax = PPSV23 Adults 25-60 years old: 1-3 doses may be recommended based on certain risk factors  Adults 72 years old: Prevnar (PCV13) vaccine recommended followed by Pneumovax (PPSV23) vaccine  If already received PPSV23 since turning 65, then PCV13 recommended at least one year after PPSV23 dose  Hepatitis B Vaccine 3 dose series if at intermediate or high risk (ex: diabetes, end stage renal disease, liver disease)   Tetanus (Td) Vaccine - COST NOT COVERED BY MEDICARE PART B Following completion of primary series, a booster dose should be given every 10 years to maintain immunity against tetanus  Td may also be given as tetanus wound prophylaxis  Tdap Vaccine - COST NOT COVERED BY MEDICARE PART B Recommended at least once for all adults  For pregnant patients, recommended with each pregnancy  Shingles Vaccine (Shingrix) - COST NOT COVERED BY MEDICARE PART B  2 shot series recommended in those aged 48 and above     Health Maintenance Due:      Topic Date Due    Hepatitis C Screening  Completed     Immunizations Due:      Topic Date Due    DTaP,Tdap,and Td Vaccines (1 - Tdap) 10/11/1961    Influenza Vaccine  07/01/2020     Advance Directives   What are advance directives? Advance directives are legal documents that state your wishes and plans for medical care  These plans are made ahead of time in case you lose your ability to make decisions for yourself  Advance directives can apply to any medical decision, such as the treatments you want, and if you want to donate organs  What are the types of advance directives? There are many types of advance directives, and each state has rules about how to use them  You may choose a combination of any of the following:  · Living will: This is a written record of the treatment you want  You can also choose which treatments you do not want, which to limit, and which to stop at a certain time  This includes surgery, medicine, IV fluid, and tube feedings  · Durable power of  for healthcare [facility]):   This is a written record that states who you want to make healthcare choices for you when you are unable to make them for yourself  This person, called a proxy, is usually a family member or a friend  You may choose more than 1 proxy  · Do not resuscitate (DNR) order:  A DNR order is used in case your heart stops beating or you stop breathing  It is a request not to have certain forms of treatment, such as CPR  A DNR order may be included in other types of advance directives  · Medical directive: This covers the care that you want if you are in a coma, near death, or unable to make decisions for yourself  You can list the treatments you want for each condition  Treatment may include pain medicine, surgery, blood transfusions, dialysis, IV or tube feedings, and a ventilator (breathing machine)  · Values history: This document has questions about your views, beliefs, and how you feel and think about life  This information can help others choose the care that you would choose  Why are advance directives important? An advance directive helps you control your care  Although spoken wishes may be used, it is better to have your wishes written down  Spoken wishes can be misunderstood, or not followed  Treatments may be given even if you do not want them  An advance directive may make it easier for your family to make difficult choices about your care  © Copyright [employer] 2018 Information is for End User's use only and may not be sold, redistributed or otherwise used for commercial purposes   All illustrations and images included in CareNotes® are the copyrighted property of [employer]  or [address] [place], [place] [address]ethority

## 2020-08-28 VITALS
DIASTOLIC BLOOD PRESSURE: 80 MMHG | BODY MASS INDEX: 22.28 KG/M2 | SYSTOLIC BLOOD PRESSURE: 120 MMHG | TEMPERATURE: 97.7 F | OXYGEN SATURATION: 98 % | WEIGHT: 147 LBS | HEART RATE: 64 BPM | HEIGHT: 68 IN

## 2020-08-28 RX ORDER — DONEPEZIL HYDROCHLORIDE 10 MG/1
10 TABLET, FILM COATED ORAL
Qty: 90 TABLET | Refills: 1 | Status: SHIPPED | OUTPATIENT
Start: 2020-08-28 | End: 2021-03-03 | Stop reason: SDUPTHER

## 2020-08-31 ENCOUNTER — APPOINTMENT (OUTPATIENT)
Dept: LAB | Facility: CLINIC | Age: 80
End: 2020-08-31
Payer: COMMERCIAL

## 2020-08-31 DIAGNOSIS — I10 HYPERTENSION, UNSPECIFIED TYPE: ICD-10-CM

## 2020-08-31 DIAGNOSIS — E78.5 HYPERLIPIDEMIA, UNSPECIFIED HYPERLIPIDEMIA TYPE: ICD-10-CM

## 2020-08-31 DIAGNOSIS — I25.10 CORONARY ARTERY DISEASE INVOLVING NATIVE CORONARY ARTERY OF NATIVE HEART WITHOUT ANGINA PECTORIS: ICD-10-CM

## 2020-08-31 LAB
ALBUMIN SERPL BCP-MCNC: 3.7 G/DL (ref 3.5–5)
ALP SERPL-CCNC: 61 U/L (ref 46–116)
ALT SERPL W P-5'-P-CCNC: 29 U/L (ref 12–78)
ANION GAP SERPL CALCULATED.3IONS-SCNC: 3 MMOL/L (ref 4–13)
AST SERPL W P-5'-P-CCNC: 23 U/L (ref 5–45)
BACTERIA UR QL AUTO: NORMAL /HPF
BASOPHILS # BLD AUTO: 0.04 THOUSANDS/ΜL (ref 0–0.1)
BASOPHILS NFR BLD AUTO: 1 % (ref 0–1)
BILIRUB SERPL-MCNC: 0.64 MG/DL (ref 0.2–1)
BILIRUB UR QL STRIP: NEGATIVE
BUN SERPL-MCNC: 23 MG/DL (ref 5–25)
CALCIUM SERPL-MCNC: 9.3 MG/DL (ref 8.3–10.1)
CHLORIDE SERPL-SCNC: 104 MMOL/L (ref 100–108)
CHOLEST SERPL-MCNC: 184 MG/DL (ref 50–200)
CLARITY UR: CLEAR
CO2 SERPL-SCNC: 34 MMOL/L (ref 21–32)
COLOR UR: YELLOW
CREAT SERPL-MCNC: 1.47 MG/DL (ref 0.6–1.3)
EOSINOPHIL # BLD AUTO: 0.16 THOUSAND/ΜL (ref 0–0.61)
EOSINOPHIL NFR BLD AUTO: 2 % (ref 0–6)
ERYTHROCYTE [DISTWIDTH] IN BLOOD BY AUTOMATED COUNT: 14.3 % (ref 11.6–15.1)
FERRITIN SERPL-MCNC: 26 NG/ML (ref 8–388)
GFR SERPL CREATININE-BSD FRML MDRD: 44 ML/MIN/1.73SQ M
GLUCOSE P FAST SERPL-MCNC: 99 MG/DL (ref 65–99)
GLUCOSE UR STRIP-MCNC: NEGATIVE MG/DL
HCT VFR BLD AUTO: 46.3 % (ref 36.5–49.3)
HDLC SERPL-MCNC: 45 MG/DL
HGB BLD-MCNC: 15.6 G/DL (ref 12–17)
HGB UR QL STRIP.AUTO: NEGATIVE
HYALINE CASTS #/AREA URNS LPF: NORMAL /LPF
IMM GRANULOCYTES # BLD AUTO: 0.02 THOUSAND/UL (ref 0–0.2)
IMM GRANULOCYTES NFR BLD AUTO: 0 % (ref 0–2)
KETONES UR STRIP-MCNC: NEGATIVE MG/DL
LDLC SERPL CALC-MCNC: 115 MG/DL (ref 0–100)
LEUKOCYTE ESTERASE UR QL STRIP: NEGATIVE
LYMPHOCYTES # BLD AUTO: 1.62 THOUSANDS/ΜL (ref 0.6–4.47)
LYMPHOCYTES NFR BLD AUTO: 23 % (ref 14–44)
MCH RBC QN AUTO: 32.4 PG (ref 26.8–34.3)
MCHC RBC AUTO-ENTMCNC: 33.7 G/DL (ref 31.4–37.4)
MCV RBC AUTO: 96 FL (ref 82–98)
MONOCYTES # BLD AUTO: 1.04 THOUSAND/ΜL (ref 0.17–1.22)
MONOCYTES NFR BLD AUTO: 15 % (ref 4–12)
NEUTROPHILS # BLD AUTO: 4.12 THOUSANDS/ΜL (ref 1.85–7.62)
NEUTS SEG NFR BLD AUTO: 59 % (ref 43–75)
NITRITE UR QL STRIP: NEGATIVE
NON-SQ EPI CELLS URNS QL MICRO: NORMAL /HPF
NONHDLC SERPL-MCNC: 139 MG/DL
NRBC BLD AUTO-RTO: 0 /100 WBCS
PH UR STRIP.AUTO: 6.5 [PH]
PLATELET # BLD AUTO: 148 THOUSANDS/UL (ref 149–390)
PMV BLD AUTO: 12.2 FL (ref 8.9–12.7)
POTASSIUM SERPL-SCNC: 3.3 MMOL/L (ref 3.5–5.3)
PROT SERPL-MCNC: 7.4 G/DL (ref 6.4–8.2)
PROT UR STRIP-MCNC: ABNORMAL MG/DL
RBC # BLD AUTO: 4.82 MILLION/UL (ref 3.88–5.62)
RBC #/AREA URNS AUTO: NORMAL /HPF
SODIUM SERPL-SCNC: 141 MMOL/L (ref 136–145)
SP GR UR STRIP.AUTO: 1.02 (ref 1–1.03)
TRIGL SERPL-MCNC: 119 MG/DL
TSH SERPL DL<=0.05 MIU/L-ACNC: 2.08 UIU/ML (ref 0.36–3.74)
UROBILINOGEN UR QL STRIP.AUTO: 0.2 E.U./DL
VIT B12 SERPL-MCNC: 717 PG/ML (ref 100–900)
WBC # BLD AUTO: 7 THOUSAND/UL (ref 4.31–10.16)
WBC #/AREA URNS AUTO: NORMAL /HPF

## 2020-08-31 PROCEDURE — 82728 ASSAY OF FERRITIN: CPT

## 2020-08-31 PROCEDURE — 80053 COMPREHEN METABOLIC PANEL: CPT

## 2020-08-31 PROCEDURE — 82607 VITAMIN B-12: CPT

## 2020-08-31 PROCEDURE — 36415 COLL VENOUS BLD VENIPUNCTURE: CPT

## 2020-08-31 PROCEDURE — 81001 URINALYSIS AUTO W/SCOPE: CPT

## 2020-08-31 PROCEDURE — 84443 ASSAY THYROID STIM HORMONE: CPT

## 2020-08-31 PROCEDURE — 85025 COMPLETE CBC W/AUTO DIFF WBC: CPT

## 2020-08-31 PROCEDURE — 80061 LIPID PANEL: CPT

## 2020-09-19 DIAGNOSIS — I10 HYPERTENSION, UNSPECIFIED TYPE: ICD-10-CM

## 2020-09-20 RX ORDER — AMLODIPINE BESYLATE 5 MG/1
TABLET ORAL
Qty: 90 TABLET | Refills: 0 | Status: SHIPPED | OUTPATIENT
Start: 2020-09-20 | End: 2020-12-18 | Stop reason: SDUPTHER

## 2020-09-23 DIAGNOSIS — E87.6 LOW SERUM POTASSIUM: Primary | ICD-10-CM

## 2020-09-23 RX ORDER — POTASSIUM CHLORIDE 20 MEQ/1
20 TABLET, EXTENDED RELEASE ORAL DAILY
Qty: 30 TABLET | Refills: 1 | Status: SHIPPED | OUTPATIENT
Start: 2020-09-23 | End: 2020-10-18

## 2020-09-23 NOTE — TELEPHONE ENCOUNTER
----- Message from Dina Hudson MD sent at 8/31/2020  5:02 PM EDT -----  k 3 3  Suggest kcl 20 meq/d    Bmp in 2-3 wks

## 2020-09-23 NOTE — TELEPHONE ENCOUNTER
----- Message from Niki Méndez MD sent at 8/31/2020  5:02 PM EDT -----  k 3 3  Suggest kcl 20 meq/d    Bmp in 2-3 wks

## 2020-10-17 DIAGNOSIS — E87.6 LOW SERUM POTASSIUM: ICD-10-CM

## 2020-10-18 RX ORDER — POTASSIUM CHLORIDE 1500 MG/1
TABLET, EXTENDED RELEASE ORAL
Qty: 30 TABLET | Refills: 1 | Status: SHIPPED | OUTPATIENT
Start: 2020-10-18 | End: 2020-11-11

## 2020-10-21 ENCOUNTER — LAB (OUTPATIENT)
Dept: LAB | Age: 80
End: 2020-10-21
Payer: COMMERCIAL

## 2020-10-21 DIAGNOSIS — E87.6 LOW SERUM POTASSIUM: ICD-10-CM

## 2020-10-21 LAB
ANION GAP SERPL CALCULATED.3IONS-SCNC: 2 MMOL/L (ref 4–13)
BUN SERPL-MCNC: 23 MG/DL (ref 5–25)
CALCIUM SERPL-MCNC: 10.2 MG/DL (ref 8.3–10.1)
CHLORIDE SERPL-SCNC: 104 MMOL/L (ref 100–108)
CO2 SERPL-SCNC: 34 MMOL/L (ref 21–32)
CREAT SERPL-MCNC: 1.47 MG/DL (ref 0.6–1.3)
GFR SERPL CREATININE-BSD FRML MDRD: 44 ML/MIN/1.73SQ M
GLUCOSE SERPL-MCNC: 88 MG/DL (ref 65–140)
POTASSIUM SERPL-SCNC: 4.4 MMOL/L (ref 3.5–5.3)
SODIUM SERPL-SCNC: 140 MMOL/L (ref 136–145)

## 2020-10-21 PROCEDURE — 80048 BASIC METABOLIC PNL TOTAL CA: CPT

## 2020-10-21 PROCEDURE — 36415 COLL VENOUS BLD VENIPUNCTURE: CPT

## 2020-10-22 ENCOUNTER — TELEPHONE (OUTPATIENT)
Dept: INTERNAL MEDICINE CLINIC | Facility: CLINIC | Age: 80
End: 2020-10-22

## 2020-11-11 DIAGNOSIS — E87.6 LOW SERUM POTASSIUM: ICD-10-CM

## 2020-11-11 RX ORDER — POTASSIUM CHLORIDE 1500 MG/1
TABLET, EXTENDED RELEASE ORAL
Qty: 30 TABLET | Refills: 1 | Status: SHIPPED | OUTPATIENT
Start: 2020-11-11 | End: 2020-12-06

## 2020-11-19 ENCOUNTER — APPOINTMENT (OUTPATIENT)
Dept: LAB | Age: 80
End: 2020-11-19
Payer: COMMERCIAL

## 2020-11-19 ENCOUNTER — TRANSCRIBE ORDERS (OUTPATIENT)
Dept: ADMINISTRATIVE | Age: 80
End: 2020-11-19

## 2020-11-19 DIAGNOSIS — E78.2 MIXED HYPERLIPIDEMIA: Primary | ICD-10-CM

## 2020-11-19 DIAGNOSIS — E78.2 MIXED HYPERLIPIDEMIA: ICD-10-CM

## 2020-11-19 LAB
ALBUMIN SERPL BCP-MCNC: 3.7 G/DL (ref 3.5–5)
ALP SERPL-CCNC: 65 U/L (ref 46–116)
ALT SERPL W P-5'-P-CCNC: 23 U/L (ref 12–78)
ANION GAP SERPL CALCULATED.3IONS-SCNC: 5 MMOL/L (ref 4–13)
AST SERPL W P-5'-P-CCNC: 20 U/L (ref 5–45)
BASOPHILS # BLD AUTO: 0.06 THOUSANDS/ΜL (ref 0–0.1)
BASOPHILS NFR BLD AUTO: 1 % (ref 0–1)
BILIRUB SERPL-MCNC: 0.59 MG/DL (ref 0.2–1)
BUN SERPL-MCNC: 20 MG/DL (ref 5–25)
CALCIUM SERPL-MCNC: 10 MG/DL (ref 8.3–10.1)
CHLORIDE SERPL-SCNC: 106 MMOL/L (ref 100–108)
CHOLEST SERPL-MCNC: 169 MG/DL (ref 50–200)
CO2 SERPL-SCNC: 33 MMOL/L (ref 21–32)
CREAT SERPL-MCNC: 1.38 MG/DL (ref 0.6–1.3)
EOSINOPHIL # BLD AUTO: 0.31 THOUSAND/ΜL (ref 0–0.61)
EOSINOPHIL NFR BLD AUTO: 4 % (ref 0–6)
ERYTHROCYTE [DISTWIDTH] IN BLOOD BY AUTOMATED COUNT: 14.3 % (ref 11.6–15.1)
GFR SERPL CREATININE-BSD FRML MDRD: 48 ML/MIN/1.73SQ M
GLUCOSE P FAST SERPL-MCNC: 89 MG/DL (ref 65–99)
HCT VFR BLD AUTO: 45.6 % (ref 36.5–49.3)
HDLC SERPL-MCNC: 46 MG/DL
HGB BLD-MCNC: 14.6 G/DL (ref 12–17)
IMM GRANULOCYTES # BLD AUTO: 0.01 THOUSAND/UL (ref 0–0.2)
IMM GRANULOCYTES NFR BLD AUTO: 0 % (ref 0–2)
LDLC SERPL CALC-MCNC: 94 MG/DL (ref 0–100)
LYMPHOCYTES # BLD AUTO: 1.87 THOUSANDS/ΜL (ref 0.6–4.47)
LYMPHOCYTES NFR BLD AUTO: 26 % (ref 14–44)
MCH RBC QN AUTO: 31 PG (ref 26.8–34.3)
MCHC RBC AUTO-ENTMCNC: 32 G/DL (ref 31.4–37.4)
MCV RBC AUTO: 97 FL (ref 82–98)
MONOCYTES # BLD AUTO: 0.99 THOUSAND/ΜL (ref 0.17–1.22)
MONOCYTES NFR BLD AUTO: 14 % (ref 4–12)
NEUTROPHILS # BLD AUTO: 3.84 THOUSANDS/ΜL (ref 1.85–7.62)
NEUTS SEG NFR BLD AUTO: 55 % (ref 43–75)
NONHDLC SERPL-MCNC: 123 MG/DL
NRBC BLD AUTO-RTO: 0 /100 WBCS
PLATELET # BLD AUTO: 147 THOUSANDS/UL (ref 149–390)
PMV BLD AUTO: 11.5 FL (ref 8.9–12.7)
POTASSIUM SERPL-SCNC: 3.7 MMOL/L (ref 3.5–5.3)
PROT SERPL-MCNC: 7 G/DL (ref 6.4–8.2)
RBC # BLD AUTO: 4.71 MILLION/UL (ref 3.88–5.62)
SODIUM SERPL-SCNC: 144 MMOL/L (ref 136–145)
TRIGL SERPL-MCNC: 145 MG/DL
WBC # BLD AUTO: 7.08 THOUSAND/UL (ref 4.31–10.16)

## 2020-11-19 PROCEDURE — 80061 LIPID PANEL: CPT

## 2020-11-19 PROCEDURE — 80053 COMPREHEN METABOLIC PANEL: CPT

## 2020-11-19 PROCEDURE — 36415 COLL VENOUS BLD VENIPUNCTURE: CPT

## 2020-11-19 PROCEDURE — 85025 COMPLETE CBC W/AUTO DIFF WBC: CPT

## 2020-12-06 DIAGNOSIS — E87.6 LOW SERUM POTASSIUM: ICD-10-CM

## 2020-12-06 RX ORDER — POTASSIUM CHLORIDE 1500 MG/1
TABLET, EXTENDED RELEASE ORAL
Qty: 30 TABLET | Refills: 1 | Status: SHIPPED | OUTPATIENT
Start: 2020-12-06 | End: 2021-01-11

## 2020-12-18 DIAGNOSIS — I10 HYPERTENSION, UNSPECIFIED TYPE: ICD-10-CM

## 2020-12-18 RX ORDER — AMLODIPINE BESYLATE 5 MG/1
5 TABLET ORAL DAILY
Qty: 90 TABLET | Refills: 0 | Status: SHIPPED | OUTPATIENT
Start: 2020-12-18 | End: 2021-03-12

## 2020-12-21 ENCOUNTER — OFFICE VISIT (OUTPATIENT)
Dept: FAMILY MEDICINE CLINIC | Facility: CLINIC | Age: 80
End: 2020-12-21
Payer: COMMERCIAL

## 2020-12-21 VITALS
OXYGEN SATURATION: 98 % | TEMPERATURE: 96.1 F | SYSTOLIC BLOOD PRESSURE: 130 MMHG | WEIGHT: 143 LBS | DIASTOLIC BLOOD PRESSURE: 80 MMHG | HEIGHT: 68 IN | RESPIRATION RATE: 16 BRPM | HEART RATE: 53 BPM | BODY MASS INDEX: 21.67 KG/M2

## 2020-12-21 DIAGNOSIS — R00.1 SINUS BRADYCARDIA: ICD-10-CM

## 2020-12-21 DIAGNOSIS — E78.5 HYPERLIPIDEMIA, UNSPECIFIED HYPERLIPIDEMIA TYPE: ICD-10-CM

## 2020-12-21 DIAGNOSIS — R20.9 COLD EXTREMITIES: ICD-10-CM

## 2020-12-21 DIAGNOSIS — N18.31 STAGE 3A CHRONIC KIDNEY DISEASE (HCC): ICD-10-CM

## 2020-12-21 DIAGNOSIS — D69.6 PLATELETS DECREASED (HCC): ICD-10-CM

## 2020-12-21 DIAGNOSIS — I10 ESSENTIAL HYPERTENSION: Primary | ICD-10-CM

## 2020-12-21 PROCEDURE — 3075F SYST BP GE 130 - 139MM HG: CPT | Performed by: INTERNAL MEDICINE

## 2020-12-21 PROCEDURE — 3079F DIAST BP 80-89 MM HG: CPT | Performed by: INTERNAL MEDICINE

## 2020-12-21 PROCEDURE — 1160F RVW MEDS BY RX/DR IN RCRD: CPT | Performed by: INTERNAL MEDICINE

## 2020-12-21 PROCEDURE — 99214 OFFICE O/P EST MOD 30 MIN: CPT | Performed by: INTERNAL MEDICINE

## 2020-12-29 DIAGNOSIS — E87.6 LOW SERUM POTASSIUM: ICD-10-CM

## 2020-12-29 RX ORDER — POTASSIUM CHLORIDE 1500 MG/1
TABLET, EXTENDED RELEASE ORAL
Qty: 30 TABLET | Refills: 1 | Status: SHIPPED | OUTPATIENT
Start: 2020-12-29 | End: 2021-01-21

## 2020-12-30 ENCOUNTER — LAB (OUTPATIENT)
Dept: LAB | Age: 80
End: 2020-12-30
Payer: COMMERCIAL

## 2020-12-30 DIAGNOSIS — N18.31 STAGE 3A CHRONIC KIDNEY DISEASE (HCC): ICD-10-CM

## 2020-12-30 LAB
ALBUMIN SERPL BCP-MCNC: 3.5 G/DL (ref 3.5–5)
ALP SERPL-CCNC: 71 U/L (ref 46–116)
ALT SERPL W P-5'-P-CCNC: 31 U/L (ref 12–78)
ANION GAP SERPL CALCULATED.3IONS-SCNC: 2 MMOL/L (ref 4–13)
AST SERPL W P-5'-P-CCNC: 49 U/L (ref 5–45)
BASOPHILS # BLD AUTO: 0.06 THOUSANDS/ΜL (ref 0–0.1)
BASOPHILS NFR BLD AUTO: 1 % (ref 0–1)
BILIRUB SERPL-MCNC: 0.47 MG/DL (ref 0.2–1)
BUN SERPL-MCNC: 23 MG/DL (ref 5–25)
CALCIUM SERPL-MCNC: 9.6 MG/DL (ref 8.3–10.1)
CHLORIDE SERPL-SCNC: 106 MMOL/L (ref 100–108)
CO2 SERPL-SCNC: 32 MMOL/L (ref 21–32)
CREAT SERPL-MCNC: 1.45 MG/DL (ref 0.6–1.3)
CREAT UR-MCNC: 180 MG/DL
EOSINOPHIL # BLD AUTO: 0.24 THOUSAND/ΜL (ref 0–0.61)
EOSINOPHIL NFR BLD AUTO: 4 % (ref 0–6)
ERYTHROCYTE [DISTWIDTH] IN BLOOD BY AUTOMATED COUNT: 14.6 % (ref 11.6–15.1)
GFR SERPL CREATININE-BSD FRML MDRD: 45 ML/MIN/1.73SQ M
GLUCOSE P FAST SERPL-MCNC: 95 MG/DL (ref 65–99)
HCT VFR BLD AUTO: 46.4 % (ref 36.5–49.3)
HGB BLD-MCNC: 15.2 G/DL (ref 12–17)
IMM GRANULOCYTES # BLD AUTO: 0.02 THOUSAND/UL (ref 0–0.2)
IMM GRANULOCYTES NFR BLD AUTO: 0 % (ref 0–2)
LYMPHOCYTES # BLD AUTO: 1.55 THOUSANDS/ΜL (ref 0.6–4.47)
LYMPHOCYTES NFR BLD AUTO: 24 % (ref 14–44)
MCH RBC QN AUTO: 31.2 PG (ref 26.8–34.3)
MCHC RBC AUTO-ENTMCNC: 32.8 G/DL (ref 31.4–37.4)
MCV RBC AUTO: 95 FL (ref 82–98)
MICROALBUMIN UR-MCNC: 144 MG/L (ref 0–20)
MICROALBUMIN/CREAT 24H UR: 80 MG/G CREATININE (ref 0–30)
MONOCYTES # BLD AUTO: 0.91 THOUSAND/ΜL (ref 0.17–1.22)
MONOCYTES NFR BLD AUTO: 14 % (ref 4–12)
NEUTROPHILS # BLD AUTO: 3.78 THOUSANDS/ΜL (ref 1.85–7.62)
NEUTS SEG NFR BLD AUTO: 57 % (ref 43–75)
NRBC BLD AUTO-RTO: 0 /100 WBCS
PHOSPHATE SERPL-MCNC: 3.7 MG/DL (ref 2.3–4.1)
PLATELET # BLD AUTO: 175 THOUSANDS/UL (ref 149–390)
PMV BLD AUTO: 11.4 FL (ref 8.9–12.7)
POTASSIUM SERPL-SCNC: 5.1 MMOL/L (ref 3.5–5.3)
PROT SERPL-MCNC: 7.3 G/DL (ref 6.4–8.2)
RBC # BLD AUTO: 4.87 MILLION/UL (ref 3.88–5.62)
SODIUM SERPL-SCNC: 140 MMOL/L (ref 136–145)
TSH SERPL DL<=0.05 MIU/L-ACNC: 2.62 UIU/ML (ref 0.36–3.74)
WBC # BLD AUTO: 6.56 THOUSAND/UL (ref 4.31–10.16)

## 2020-12-30 PROCEDURE — 36415 COLL VENOUS BLD VENIPUNCTURE: CPT | Performed by: INTERNAL MEDICINE

## 2020-12-30 PROCEDURE — 84443 ASSAY THYROID STIM HORMONE: CPT | Performed by: INTERNAL MEDICINE

## 2020-12-30 PROCEDURE — 82043 UR ALBUMIN QUANTITATIVE: CPT | Performed by: INTERNAL MEDICINE

## 2020-12-30 PROCEDURE — 82570 ASSAY OF URINE CREATININE: CPT | Performed by: INTERNAL MEDICINE

## 2020-12-30 PROCEDURE — 84100 ASSAY OF PHOSPHORUS: CPT

## 2020-12-30 PROCEDURE — 84165 PROTEIN E-PHORESIS SERUM: CPT | Performed by: PATHOLOGY

## 2020-12-30 PROCEDURE — 84165 PROTEIN E-PHORESIS SERUM: CPT

## 2020-12-30 PROCEDURE — 85025 COMPLETE CBC W/AUTO DIFF WBC: CPT | Performed by: INTERNAL MEDICINE

## 2020-12-30 PROCEDURE — 80053 COMPREHEN METABOLIC PANEL: CPT | Performed by: INTERNAL MEDICINE

## 2020-12-31 ENCOUNTER — HOSPITAL ENCOUNTER (OUTPATIENT)
Dept: RADIOLOGY | Age: 80
Discharge: HOME | End: 2020-12-31
Payer: COMMERCIAL

## 2020-12-31 DIAGNOSIS — N18.31 STAGE 3A CHRONIC KIDNEY DISEASE (HCC): ICD-10-CM

## 2020-12-31 LAB
ALBUMIN SERPL ELPH-MCNC: 4.03 G/DL (ref 3.5–5)
ALBUMIN SERPL ELPH-MCNC: 57.5 % (ref 52–65)
ALPHA1 GLOB SERPL ELPH-MCNC: 0.29 G/DL (ref 0.1–0.4)
ALPHA1 GLOB SERPL ELPH-MCNC: 4.1 % (ref 2.5–5)
ALPHA2 GLOB SERPL ELPH-MCNC: 0.84 G/DL (ref 0.4–1.2)
ALPHA2 GLOB SERPL ELPH-MCNC: 12 % (ref 7–13)
BETA GLOB ABNORMAL SERPL ELPH-MCNC: 0.4 G/DL (ref 0.4–0.8)
BETA1 GLOB SERPL ELPH-MCNC: 5.7 % (ref 5–13)
BETA2 GLOB SERPL ELPH-MCNC: 7 % (ref 2–8)
BETA2+GAMMA GLOB SERPL ELPH-MCNC: 0.49 G/DL (ref 0.2–0.5)
GAMMA GLOB ABNORMAL SERPL ELPH-MCNC: 0.96 G/DL (ref 0.5–1.6)
GAMMA GLOB SERPL ELPH-MCNC: 13.7 % (ref 12–22)
IGG/ALB SER: 1.35 {RATIO} (ref 1.1–1.8)
PROT PATTERN SERPL ELPH-IMP: NORMAL
PROT SERPL-MCNC: 7 G/DL (ref 6.4–8.2)

## 2020-12-31 PROCEDURE — 76770 US EXAM ABDO BACK WALL COMP: CPT

## 2021-01-13 ENCOUNTER — OFFICE VISIT (OUTPATIENT)
Dept: UROLOGY | Facility: AMBULATORY SURGERY CENTER | Age: 81
End: 2021-01-13
Payer: COMMERCIAL

## 2021-01-13 VITALS
HEIGHT: 68 IN | BODY MASS INDEX: 21.34 KG/M2 | WEIGHT: 140.8 LBS | SYSTOLIC BLOOD PRESSURE: 128 MMHG | DIASTOLIC BLOOD PRESSURE: 76 MMHG | HEART RATE: 59 BPM

## 2021-01-13 DIAGNOSIS — N40.1 BENIGN PROSTATIC HYPERPLASIA WITH LOWER URINARY TRACT SYMPTOMS, SYMPTOM DETAILS UNSPECIFIED: Primary | ICD-10-CM

## 2021-01-13 PROCEDURE — 99213 OFFICE O/P EST LOW 20 MIN: CPT | Performed by: NURSE PRACTITIONER

## 2021-01-13 PROCEDURE — 1036F TOBACCO NON-USER: CPT | Performed by: NURSE PRACTITIONER

## 2021-01-13 PROCEDURE — 1160F RVW MEDS BY RX/DR IN RCRD: CPT | Performed by: NURSE PRACTITIONER

## 2021-01-13 NOTE — PROGRESS NOTES
1/13/2021      Chief Complaint   Patient presents with    Follow-up    Benign Prostatic Hypertrophy     Assessment and Plan    79 y o  male managed by Dr Suzan Beckman    1  Benign prostatic hyperplasia with lower urinary tract symptoms  · Continue Finasteride- no prescription refill needed at this time  ·  TRAV reveals a prostate size of 45-50 g with no nodularity noted  ·  continue to monitor urinary symptoms  ·  maintain adequate hydration upwards to 40 oz per day  ·  follow up in the office in 1 year    History of Present Illness  Ildefonso Marquez is a 79 y o  male here for follow up evaluation of urinary symptoms secondary benign prostatic hyperplasia  Patient has a history of taking finasteride secondary to benign prostatic hyperplasia with good control of urinary symptoms  He currently has no lower urinary symptoms to complain of  He denies dysuria, hematuria, urinary frequency and urgency  He denies a family history of prostate cancer  He reports doing very well since his prior office visit  He reports getting up approximately 1-2 times a night to urinate  Review of Systems   Constitutional: Negative for chills and fever  Respiratory: Negative for cough and shortness of breath  Cardiovascular: Negative for chest pain  Gastrointestinal: Negative for abdominal distention, abdominal pain, blood in stool, nausea and vomiting  Genitourinary: Negative for difficulty urinating, dysuria, enuresis, flank pain, frequency, hematuria and urgency  Skin: Negative for rash  Urinary Incontinence Screening      Most Recent Value   Urinary Incontinence   Urinary Incontinence? No   Incomplete emptying? Yes   Urinary frequency? Yes   Urinary urgency? Yes   Urinary hesitancy? No   Dysuria (painful difficult urination)? No   Nocturia (waking up to use the bathroom)? Yes [1x]   Straining (having to push to go)?   No   Weak stream?  Yes   Intermittent stream?  Yes          AUA SYMPTOM SCORE      Most Recent Value   AUA SYMPTOM SCORE   How often have you had a sensation of not emptying your bladder completely after you finished urinating? 2   How often have you had to urinate again less than two hours after you finished urinating? 5   How often have you found you stopped and started again several times when you urinate? 1   How often have you found it difficult to postpone urination? 2   How often have you had a weak urinary stream?  2   How often have you had to push or strain to begin urination? 0   How many times did you most typically get up to urinate from the time you went to bed at night until the time you got up in the morning? 1   Quality of Life: If you were to spend the rest of your life with your urinary condition just the way it is now, how would you feel about that?  5   AUA SYMPTOM SCORE  13             Past Medical History  Past Medical History:   Diagnosis Date    Basal cell carcinoma     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     Elevated PSA     R   11/16/17    Wears glasses        Past Social History  Past Surgical History:   Procedure Laterality Date    COLONOSCOPY      INGUINAL HERNIA REPAIR      MOHS SURGERY      SKIN BIOPSY      basal cell     TONSILLECTOMY       Social History     Tobacco Use   Smoking Status Never Smoker   Smokeless Tobacco Never Used       Past Family History  Family History   Problem Relation Age of Onset    COPD Mother     Heart failure Mother     Heart attack Father     Lung cancer Brother     No Known Problems Son     No Known Problems Daughter     No Known Problems Daughter        Past Social history  Social History     Socioeconomic History    Marital status: /Civil Union     Spouse name: Adore Nunez Number of children: 3    Years of education: Not on file    Highest education level: Not on file   Occupational History    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      Comment: retired    Social Needs    Financial resource strain: Not on file    Food insecurity     Worry: Not on file     Inability: Not on file    Transportation needs     Medical: Not on file     Non-medical: Not on file   Tobacco Use    Smoking status: Never Smoker    Smokeless tobacco: Never Used   Substance and Sexual Activity    Alcohol use: No    Drug use: No    Sexual activity: Not on file   Lifestyle    Physical activity     Days per week: 7 days     Minutes per session: 30 min    Stress: Not on file   Relationships    Social connections     Talks on phone: Not on file     Gets together: Not on file     Attends Christianity service: Not on file     Active member of club or organization: Not on file     Attends meetings of clubs or organizations: Not on file     Relationship status: Not on file    Intimate partner violence     Fear of current or ex partner: Not on file     Emotionally abused: Not on file     Physically abused: Not on file     Forced sexual activity: Not on file   Other Topics Concern    Not on file   Social History Narrative    Who lives in your home: wife     What type of home do you live in: Other penitentiary home [place]     Age of your home: 2 5 yrs the portion where they live     How long have you been living there: 2017    Type of heat: Forced hot air    Type of fuel: Electric    What type of sadia is in your bedroom: Area rugs and Hardwood floor    Do you have the following in or near your home:    Air products: Central air    Pests: None    Pets: None    Are pets allowed in bedroom: N/A    Open fields, wooded areas nearby: Open fields    Basement: None    Exposure to second hand smoke: No        Habits:    Caffeine: soda occasionally-hot tea 1-2 cups daily     Chocolate: rarely     Other:       Current Medications  Current Outpatient Medications   Medication Sig Dispense Refill    amLODIPine (NORVASC) 5 mg tablet Take 1 tablet (5 mg total) by mouth daily 90 tablet 0    aspirin ([address]) 91 mg EC tablet Take 81 mg by mouth      azelastine (ASTELIN) 0 1 % nasal spray 1 spray into each nostril 2 (two) times a day Use in each nostril as directed 1 Bottle 12    b complex vitamins tablet Take 1 tablet by mouth daily      Biotin w/ Vitamins C & E (HAIR/SKIN/NAILS) 1250-7 5-7 5 MCG-MG-UNT CHEW Chew      Coenzyme Q10 (CO Q10) 100 MG CAPS Take by mouth      donepezil (ARICEPT) 10 mg tablet Take 1 tablet (10 mg total) by mouth daily at bedtime 90 tablet 1    finasteride (PROSCAR) 5 mg tablet TAKE 1 TABLET BY MOUTH EVERY DAY 90 tablet 3    hydrochlorothiazide (HYDRODIURIL) 25 mg tablet TAKE 1 TABLET (25 MG TOTAL) BY MOUTH DAILY  3    ipratropium (ATROVENT) 0 06 % nasal spray 2 sprays into each nostril 4 (four) times a day 15 mL 12    Klor-Con M20 20 MEQ tablet TAKE 1 TABLET BY MOUTH EVERY DAY 30 tablet 1    Magnesium 250 MG TABS Take by mouth      Multiple Vitamins-Minerals (MULTI-VITAMIN/MINERALS PO) Take by mouth      Polyethyl Glyc-Propyl Glyc PF (Systane Preservative Free) 0 4-0 3 % SOLN Apply 2 drops to eye 4 (four) times a day as needed (Dry eye) SYSTANE or CARLOS PRESERVATIVE FREE ARTIFICIAL TEARS 120 each 11    rosuvastatin (CRESTOR) 5 mg tablet TAKE 1 TABLET (5 MG TOTAL) BY MOUTH NIGHTLY  No current facility-administered medications for this visit  Allergies  Allergies   Allergen Reactions    Other      Other reaction(s): itchy watery eyes  Other reaction(s): itchy watery eyes         The following portions of the patient's history were reviewed and updated as appropriate: allergies, current medications, past medical history, past social history, past surgical history and problem list       Vitals  Vitals:    01/13/21 0856   BP: 128/76   BP Location: Left arm   Patient Position: Sitting   Cuff Size: Adult   Pulse: 59   Weight: 63 9 kg (140 lb 12 8 oz)   Height: 5' 8" (1 727 m)           Physical Exam  Physical Exam  Vitals signs reviewed     Constitutional:       General: He is not in acute distress  Appearance: Normal appearance  He is normal weight  HENT:      Head: Normocephalic  Eyes:      Pupils: Pupils are equal, round, and reactive to light  Cardiovascular:      Rate and Rhythm: Normal rate  Pulmonary:      Effort: No respiratory distress  Breath sounds: Normal breath sounds  Genitourinary:     Comments:  TRAV reveals a prostate size of approximate 45-50 g with no nodularity noted  Skin:     General: Skin is warm and dry  Neurological:      General: No focal deficit present  Mental Status: He is alert and oriented to person, place, and time  Psychiatric:         Mood and Affect: Mood normal          Behavior: Behavior normal        Results  No results found for this or any previous visit (from the past 1 hour(s)) ]  Lab Results   Component Value Date    PSA 2 1 11/13/2018     Lab Results   Component Value Date    CALCIUM 9 6 12/30/2020     09/12/2017    K 5 1 12/30/2020    CO2 32 12/30/2020     12/30/2020    BUN 23 12/30/2020    CREATININE 1 45 (H) 12/30/2020     Lab Results   Component Value Date    WBC 6 56 12/30/2020    HGB 15 2 12/30/2020    HCT 46 4 12/30/2020    MCV 95 12/30/2020     12/30/2020     Orders  No orders of the defined types were placed in this encounter        NORMA Navas

## 2021-01-13 NOTE — PATIENT INSTRUCTIONS
Continue with finasteride as previously ordered   make sure that you are continuing to keep yourself hydrated with at least 40 oz of water per day   call the office for questions or concerns

## 2021-01-21 DIAGNOSIS — E87.6 LOW SERUM POTASSIUM: ICD-10-CM

## 2021-01-21 RX ORDER — POTASSIUM CHLORIDE 1500 MG/1
TABLET, EXTENDED RELEASE ORAL
Qty: 30 TABLET | Refills: 1 | Status: SHIPPED | OUTPATIENT
Start: 2021-01-21 | End: 2021-02-20

## 2021-02-17 ENCOUNTER — OFFICE VISIT (OUTPATIENT)
Dept: FAMILY MEDICINE CLINIC | Facility: CLINIC | Age: 81
End: 2021-02-17
Payer: COMMERCIAL

## 2021-02-17 VITALS
WEIGHT: 141.2 LBS | HEIGHT: 68 IN | DIASTOLIC BLOOD PRESSURE: 70 MMHG | SYSTOLIC BLOOD PRESSURE: 120 MMHG | HEART RATE: 54 BPM | BODY MASS INDEX: 21.4 KG/M2 | OXYGEN SATURATION: 98 % | TEMPERATURE: 97.8 F | RESPIRATION RATE: 18 BRPM

## 2021-02-17 DIAGNOSIS — N18.32 STAGE 3B CHRONIC KIDNEY DISEASE (HCC): Primary | ICD-10-CM

## 2021-02-17 DIAGNOSIS — G47.09 OTHER INSOMNIA: ICD-10-CM

## 2021-02-17 DIAGNOSIS — R00.1 SINUS BRADYCARDIA: ICD-10-CM

## 2021-02-17 DIAGNOSIS — I44.0 FIRST DEGREE AV BLOCK: ICD-10-CM

## 2021-02-17 DIAGNOSIS — R68.89 SENSATION OF FEELING COLD: ICD-10-CM

## 2021-02-17 DIAGNOSIS — F09 MILD COGNITIVE DISORDER: ICD-10-CM

## 2021-02-17 PROCEDURE — 99213 OFFICE O/P EST LOW 20 MIN: CPT | Performed by: INTERNAL MEDICINE

## 2021-02-17 NOTE — PROGRESS NOTES
Assessment/Plan:         Diagnoses and all orders for this visit:    Stage 3b chronic kidney disease  -     Ambulatory referral to Nephrology; Future    Sinus bradycardia  -     Ambulatory referral to Cardiology; Future    First degree AV block     Follow-up with cardiology   other insomnia   over-the-counter melatonin   mild cognitive disorder   continue with Aricept at current dose  Subjective:      Patient ID: Mitch Arriaza is a 79 y o  male  HPI   patient is here accompanied by his wife to follow-up on recent lab studies for his kidney function remains elevated with GFR  45%  Mild protein urea seen  Kidney and bladder ultrasound does not show any gross abnormality except bilateral renal cysts and prostate enlargement  Blood pressure is good  Once again noted bradycardia  This has been persistent even at home blood pressure monitoring  Patient does not report lightheadedness chest pain shortness of breath  He does mention feeling cold  TSH is normal   He also mentions sleep disturbance has been taking melatonin rather regularly at night and had to take double dose to help himself sleep  This point I would not start him on any medication  I would like to him to see Cardiology for his first-degree AV block bradycardia and use of Aricept before starting another medication  I would also havie him see Nephrology for mild kidney dysfunction    The following portions of the patient's history were reviewed and updated as appropriate: allergies, current medications, past family history, past medical history, past social history, past surgical history and problem list     Review of Systems      Objective:      /70 (BP Location: Left arm, Patient Position: Sitting, Cuff Size: Adult)   Pulse (!) 54   Temp 97 8 °F (36 6 °C)   Resp 18   Ht 5' 8" (1 727 m)   Wt 64 kg (141 lb 3 2 oz)   SpO2 98%   BMI 21 47 kg/m²          Physical Exam

## 2021-02-20 DIAGNOSIS — E87.6 LOW SERUM POTASSIUM: ICD-10-CM

## 2021-02-20 RX ORDER — POTASSIUM CHLORIDE 1500 MG/1
TABLET, EXTENDED RELEASE ORAL
Qty: 30 TABLET | Refills: 1 | Status: SHIPPED | OUTPATIENT
Start: 2021-02-20 | End: 2021-03-15

## 2021-02-26 DIAGNOSIS — F09 MILD COGNITIVE DISORDER: ICD-10-CM

## 2021-02-26 RX ORDER — DONEPEZIL HYDROCHLORIDE 10 MG/1
TABLET, FILM COATED ORAL
Qty: 90 TABLET | Refills: 1 | OUTPATIENT
Start: 2021-02-26

## 2021-03-03 DIAGNOSIS — F09 MILD COGNITIVE DISORDER: ICD-10-CM

## 2021-03-03 RX ORDER — DONEPEZIL HYDROCHLORIDE 10 MG/1
10 TABLET, FILM COATED ORAL
Qty: 90 TABLET | Refills: 1 | Status: SHIPPED | OUTPATIENT
Start: 2021-03-03 | End: 2021-08-20

## 2021-03-12 DIAGNOSIS — I10 HYPERTENSION, UNSPECIFIED TYPE: ICD-10-CM

## 2021-03-12 RX ORDER — AMLODIPINE BESYLATE 5 MG/1
TABLET ORAL
Qty: 90 TABLET | Refills: 0 | Status: SHIPPED | OUTPATIENT
Start: 2021-03-12 | End: 2021-03-23

## 2021-03-15 DIAGNOSIS — E87.6 LOW SERUM POTASSIUM: ICD-10-CM

## 2021-03-15 RX ORDER — POTASSIUM CHLORIDE 1500 MG/1
TABLET, EXTENDED RELEASE ORAL
Qty: 30 TABLET | Refills: 1 | Status: SHIPPED | OUTPATIENT
Start: 2021-03-15 | End: 2021-03-23

## 2021-03-20 DIAGNOSIS — N13.9 BENIGN LOCALIZED HYPERPLASIA OF PROSTATE WITH URINARY OBSTRUCTION AND LOWER URINARY TRACT SYMPTOMS: ICD-10-CM

## 2021-03-20 DIAGNOSIS — N40.1 BENIGN LOCALIZED HYPERPLASIA OF PROSTATE WITH URINARY OBSTRUCTION AND LOWER URINARY TRACT SYMPTOMS: ICD-10-CM

## 2021-03-20 RX ORDER — FINASTERIDE 5 MG/1
TABLET, FILM COATED ORAL
Qty: 90 TABLET | Refills: 3 | Status: SHIPPED | OUTPATIENT
Start: 2021-03-20 | End: 2022-01-27 | Stop reason: SDUPTHER

## 2021-03-23 ENCOUNTER — CONSULT (OUTPATIENT)
Dept: NEPHROLOGY | Facility: CLINIC | Age: 81
End: 2021-03-23
Payer: COMMERCIAL

## 2021-03-23 VITALS
DIASTOLIC BLOOD PRESSURE: 80 MMHG | RESPIRATION RATE: 16 BRPM | SYSTOLIC BLOOD PRESSURE: 128 MMHG | WEIGHT: 140.8 LBS | HEART RATE: 59 BPM | HEIGHT: 68 IN | BODY MASS INDEX: 21.34 KG/M2

## 2021-03-23 DIAGNOSIS — N13.8 BPH WITH OBSTRUCTION/LOWER URINARY TRACT SYMPTOMS: ICD-10-CM

## 2021-03-23 DIAGNOSIS — N28.1 BILATERAL RENAL CYSTS: ICD-10-CM

## 2021-03-23 DIAGNOSIS — N40.1 BPH WITH OBSTRUCTION/LOWER URINARY TRACT SYMPTOMS: ICD-10-CM

## 2021-03-23 DIAGNOSIS — R80.1 PERSISTENT PROTEINURIA: ICD-10-CM

## 2021-03-23 DIAGNOSIS — I12.9 BENIGN HYPERTENSION WITH CHRONIC KIDNEY DISEASE, STAGE III (HCC): ICD-10-CM

## 2021-03-23 DIAGNOSIS — N18.31 STAGE 3A CHRONIC KIDNEY DISEASE (HCC): Primary | ICD-10-CM

## 2021-03-23 DIAGNOSIS — N18.30 BENIGN HYPERTENSION WITH CHRONIC KIDNEY DISEASE, STAGE III (HCC): ICD-10-CM

## 2021-03-23 LAB
SL AMB  POCT GLUCOSE, UA: NEGATIVE
SL AMB LEUKOCYTE ESTERASE,UA: NEGATIVE
SL AMB POCT BILIRUBIN,UA: NEGATIVE
SL AMB POCT BLOOD,UA: NEGATIVE
SL AMB POCT CLARITY,UA: CLEAR
SL AMB POCT COLOR,UA: YELLOW
SL AMB POCT KETONES,UA: NEGATIVE
SL AMB POCT NITRITE,UA: NEGATIVE
SL AMB POCT PH,UA: 5
SL AMB POCT SPECIFIC GRAVITY,UA: 1.03
SL AMB POCT URINE PROTEIN: ABNORMAL
SL AMB POCT UROBILINOGEN: 0.2

## 2021-03-23 PROCEDURE — 99204 OFFICE O/P NEW MOD 45 MIN: CPT | Performed by: INTERNAL MEDICINE

## 2021-03-23 PROCEDURE — 1036F TOBACCO NON-USER: CPT | Performed by: INTERNAL MEDICINE

## 2021-03-23 PROCEDURE — 81002 URINALYSIS NONAUTO W/O SCOPE: CPT | Performed by: INTERNAL MEDICINE

## 2021-03-23 PROCEDURE — 3074F SYST BP LT 130 MM HG: CPT | Performed by: INTERNAL MEDICINE

## 2021-03-23 PROCEDURE — 1160F RVW MEDS BY RX/DR IN RCRD: CPT | Performed by: INTERNAL MEDICINE

## 2021-03-23 PROCEDURE — 3079F DIAST BP 80-89 MM HG: CPT | Performed by: INTERNAL MEDICINE

## 2021-03-23 RX ORDER — LOSARTAN POTASSIUM 50 MG/1
50 TABLET ORAL DAILY
Qty: 30 TABLET | Refills: 3 | Status: SHIPPED | OUTPATIENT
Start: 2021-03-23 | End: 2021-06-06 | Stop reason: SDUPTHER

## 2021-03-23 NOTE — PROGRESS NOTES
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 and age-related nephron loss  - renal ultrasound in December 2020 did not show any hydronephrosis  - SPEP from December 2020 was negative for monoclonal protein, will check kappa lambda ratio and UPEP as it was not previously check   -Plan:    Continue to monitor renal function  Repeat BMP today   To help with proteinuria, switching amlodipine to losartan  Discussed about possibility of slight increase in creatinine with use of losartan which is acceptable, also discussed about possibility of increasing potassium, last potassium level was on higher side and patient has been taking potassium chloride tablets  At this time would stop potassium chloride tablets S starting on losartan  Also stopping HCTZ due to patient complain of increased urine frequency  Will repeat BMP again in 2 weeks to monitor renal function and electrolytes   Avoid Nephrotoxins like NSAIDs and IV contrast     CKD Education:  Will discuss about referral to Kidney Smart class during the next office visit   Follow-up in 3 months with repeat labs  Monitor magnesium level, currently on oral magnesium tablet    Primary Hypertension:   -Current medication: amlodipine 5 mg and hctz 25 mg daily  -Current blood pressure: BP stable and at goal    -Plan:    · Blood pressure has been stable but in the setting of persistent proteinuria, changing amlodipine to losartan 50 mg daily    · Patient has been complaining of increased frequency of urination, could be due to use of HCTZ as well as due to BPH  Patient would prefer to stop HCTZ if it helps, would stop HCTZ for now and monitor electrolytes and renal function and blood pressure  Also recommended to stop potassium chloride as last potassium level was on higher side and now stopping HCTZ  · Stressed on home monitoring of blood pressure and maintaining a log of blood pressure with readings twice day  -Recommend 2 g sodium diet  -Recommend daily exercise and weight loss  -Discussed home monitoring of BP and maintaining a log of blood pressure   -Recommend goal BP less than 135/80  Proteinuria, persistent  -office UA positive for 1+ protein  -has persistent proteinuria with onset since 2017, upc ratio from September 2017 was 194 milligram/gram, last microalbumin creatinine ratio from December 2020 was 80 milligram/gram  -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  -currently not on ACE inhibitor or ARB and so changed amlodipine to losartan 50 mg daily on 03/23/2021 and checking BMP today as well as in 2 weeks  -continue to monitor proteinuria with repeat labs in 3 months  Bilateral renal cyst  - renal ultrasound in December 2020 suggestive of   right kidney with simple appearing upper pole cyst measuring 3 x 2 5 x 2 6 cm and simple cortical cyst measuring 2 1 x 2 5 cm in size, and other simple cyst measuring 2 1 x 1 5 cm in size and finding of mild parapelvic cyst measuring 1 9 cm in size   left kidney showed upper pole cyst measuring 5 9 x 5 3 cm and a contiguous cyst measuring 5 1 x 4 cm in size with finding of flow within the septations  - plan for repeat renal ultrasound in  6 months to monitor renal cyst   Informed patient as well as his wife about the finding  -would also recommend following Urology opinion  BPH with lower urinary tract symptoms  - follows with Urology and was last seen by Urology nurse practitioner on January 13, 2021, reviewed last Urology note    Noted plan to continue finasteride and to maintain adequate hydration and follow-up in 1 year  Had digital rectal exam which revealed prostate size 45-50 g  - renal ultrasound from December suggestive of prostatomegaly measuring 5 1 cm in size with a mass effect on the bladder base  -continue follow-up with Urology  Currently complain of increasing urinary frequency, will monitor response to stopping HCTZ but if symptoms persist most likely from BPH  Continue Proscar/finasteride    Hyperlipidemia:  On Crestor 5 mg  -last lipid panel from November 2020 showed LDL 94 which is at goal less than 100  Continue monitoring and management per PCP, recommended daily exercise  Diagnoses and all orders for this visit:    Stage 3a chronic kidney disease  -     Ambulatory referral to Nephrology  -     POCT urine dip  -     Basic metabolic panel; Future  -     Protein electrophoresis, urine; Future  -     Immunoglobulin free LT chains blood; Future  -     Basic metabolic panel; Future  -     Basic metabolic panel; Future  -     Phosphorus; Future  -     Magnesium; Future  -     Protein / creatinine ratio, urine; Future  -     Urinalysis with microscopic; Future  -     PTH, intact; Future  -     CBC; Future  -     US kidney and bladder with pvr; Future    Benign hypertension with chronic kidney disease, stage III  -     losartan (COZAAR) 50 mg tablet; Take 1 tablet (50 mg total) by mouth daily  -     Basic metabolic panel; Future    Persistent proteinuria  -     Protein / creatinine ratio, urine; Future  -     Urinalysis with microscopic; Future    Bilateral renal cysts  -     US kidney and bladder with pvr; Future    BPH with obstruction/lower urinary tract symptoms  -     US kidney and bladder with pvr; Future        Patient Instructions     Blood pressure is stable but due to protein in the urine adjustment made to blood pressure medication  Stop amlodipine, HCTZ and potassium chloride and started on losartan 50 mg daily     -Recommend 2 g sodium diet      -Recommend daily exercise and weight loss  -Discussed home monitoring of BP and maintaining a log of blood pressure   -Recommend goal BP less than 135/80  Please inform me if SBP below 110 or above 140's persistently  -Renal Function is stable  -You have Chronic Kidney Disease Stage 3  -Avoid NSAIDs like Ibuprofen/Motrin, Naproxen/Aleve, Celebrex, meloxicam/Mobic, Diclofenac and other NSAIDs   -Okay to take Acetaminophen/Tylenol if you do not have any liver problems  -Avoid IV contrast used for CT scan and cardiac catheterization     -If plan for any study with IV contrast, please let me know so we could hydrate with fluids before and after IV contrast  -Dosage  of certain medications may need to be adjusted depending on Kidney function  Plan to check blood work today to monitor renal function and electrolytes  Repeat blood work in 2-3 weeks  Will check kidney ultrasound in 6 months to monitor kidney cyst  Follow-up in 3 months with repeat blood work    Chronic Kidney Disease   AMBULATORY CARE:   Chronic kidney disease (CKD)  is the gradual and permanent loss of kidney function  Normally, the kidneys remove fluid, chemicals, and waste from your blood  These wastes are turned into urine by your kidneys  CKD may worsen over time and lead to kidney failure  Common signs and symptoms include the following:   · Changes in how often you need to urinate    · Swelling in your arms, legs, or feet    · Shortness of breath    · Fatigue or weakness    · Bad or bitter taste in your mouth    · Nausea, vomiting, or loss of appetite    Call your local emergency number (911 in the [facility]) if:   · You have a seizure  · You have shortness of breath  Call your doctor or nephrologist if:   · You are confused and very drowsy  · You suddenly gain or lose more weight than your healthcare provider has told you is okay  · You have itchy skin or a rash  · You urinate more or less than you normally do      · You have blood in your urine     · You have nausea and are vomiting  · You have fatigue or muscle weakness  · You have hiccups that will not stop  · You have questions or concerns about your condition or care  How CKD is diagnosed:  CKD has 5 stages  Your healthcare provider will use results from the following tests to find the stage of CKD you have:  · Blood and urine tests  show how well your kidneys are working  They may also show the cause of your CKD  · Ultrasound, CT scan, or MRI  pictures may be used to check your kidneys  You may be given contrast liquid to help your kidneys show up better in the pictures  Tell the healthcare provider if you have ever had an allergic reaction to contrast liquid  Do not enter the MRI room with anything metal  Metal can cause serious injury  Tell the healthcare provider if you have any metal in or on your body  · A biopsy  is a procedure to remove a small piece of tissue from your kidney  It is done to find the cause of your CKD  Treatment  can help control signs and symptoms, and prevent a worse stage of CKD  Your care team may include specialists, such as a dietitian or a heart specialist  This depends on the stage of your CKD and if you have other health conditions to manage  Healthcare providers will work with you to create a plan based on your decisions for treatment  Your treatment plan may include any of the following:  · Medicines  may be given to decrease your blood pressure and get rid of extra fluid  You may also receive medicine to manage health conditions that may occur with CKD, such as anemia, diabetes, and heart disease  · Dialysis  is a treatment to remove chemicals and waste from your blood when your kidneys can no longer do this  · Surgery  may be needed to create an arteriovenous fistula (AVF) in your arm or insert a catheter into your abdomen  This is done so you can receive dialysis      · A kidney transplant  may be done if your CKD becomes severe  What you can do to manage CKD: Management may include making some lifestyle changes  Tell your healthcare provider if you have any concerns about being able to make the changes  He or she can help you find solutions, including working with specialists  Ask for help creating a plan to break large goals into smaller steps  Your plan may include any of the following:  · Manage other health conditions  Your healthcare provider will work with you to make a care plan that meets your needs  You will be checked regularly for heart disease or other conditions that can make CKD worse, such as diabetes  Your blood pressure will be closely monitored  You will also get a target blood pressure and help making a plan to reach your target  This may include taking your blood pressure at home  · Maintain a healthy weight  Extra weight can strain your kidneys  Ask what a healthy weight is for you  Your provider can help you create a weight loss plan if you are overweight  · Create an exercise plan  Regular exercise can help you manage CKD, high blood pressure, and diabetes  Exercise also helps control weight  Your provider can help you create exercise goals and a plan to reach those goals  For example, your goal may be to exercise for 30 minutes in a day  Your plan can include breaking exercise into 10 minute sessions, 3 times during the day  · Create a healthy eating plan  Your provider may tell you to eat food low in sodium (salt), potassium, phosphorus, or protein  A dietitian can help you plan meals if needed  Ask how much liquid to drink each day and which liquids are best for you  · Limit alcohol as directed  Alcohol can cause fluid retention and can affect your kidneys  Ask how much alcohol is safe for you  A drink of alcohol is 12 ounces of beer, 5 ounces of wine, or 1½ ounces of liquor  · Do not smoke  Nicotine and other chemicals in cigarettes and cigars can cause kidney damage   Ask your provider for information if you currently smoke and need help to quit  E-cigarettes or smokeless tobacco still contain nicotine  Talk to your provider before you use these products  · Ask about over-the-counter medicines  Medicines such as NSAIDs and laxatives may harm your kidneys  Some cough and cold medicines can raise your blood pressure  Always ask if a medicine is safe before you take it  · Ask about vaccines you may need  Infections such as pneumonia, influenza, and hepatitis can be more harmful or more likely to occur in a person who has CKD  Vaccines lower your risk for infection  Follow up with your doctor as directed: You will need to return for tests to monitor your kidney and nerve function, and your parathyroid hormone level  Your medicines may be changed, based on certain test results  You may also be referred to a nephrologist (kidney specialist)  Write down your questions so you remember to ask them during your visits  © Copyright [address] [place] [address] Drive Information is for End User's use only and may not be sold, redistributed or otherwise used for commercial purposes  All illustrations and images included in CareNotes® are the copyrighted property of [employer]  or [address] [place] [address]  The above information is an  only  It is not intended as medical advice for individual conditions or treatments  Talk to your doctor, nurse or pharmacist before following any medical regimen to see if it is safe and effective for you  HISTORY OF PRESENT ILLNESS:  Gary Webb is a 79y o  year old male with medical issues of  Hypertension x , coronary artery disease, Raynaud's syndrome, BPH, hyperlipidemia who presents for initial consultation for  Chronic kidney disease stage 3  patient has elevated creatinine 1 3-1 5 since 2016 when it was 1 32  In the year 2020 creatinine was around 1 47  Last blood work from December 30, 2020 showed creatinine 1 45    Most likely baseline creatinine is around 1 4-1 5    As per patient he has been on same blood pressure medicine for a long time, does not check blood pressure at home but has been pretty stable  Denies any lower extremity edema but complain of excessive urination during the daytime and only twice during the nighttime  Has been following with urologist, denies any urine retention  Takes NSAIDs only occasionally    REVIEW OF SYSTEMS:    Review of Systems   Constitutional: Negative for chills and fever  HENT: Negative for ear pain and sore throat  Eyes: Negative for pain and visual disturbance  Respiratory: Negative for cough and shortness of breath  Cardiovascular: Negative for chest pain and palpitations  Gastrointestinal: Negative for abdominal pain and vomiting  Genitourinary: Positive for frequency  Negative for dysuria and hematuria  Musculoskeletal: Negative for arthralgias and back pain  Skin: Negative for color change and rash  Neurological: Negative for seizures and syncope  All other systems reviewed and are negative  More than 10 point review of systems were obtained and discussed in length with the patient  PAST MEDICAL HISTORY:  Past Medical History:   Diagnosis Date    Basal cell carcinoma     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     Coronary artery disease     CAD s/p stent 1995    Elevated PSA     R   11/16/17    Hypertension     Wears glasses        PAST SURGICAL HISTORY:  Past Surgical History:   Procedure Laterality Date    COLONOSCOPY      INGUINAL HERNIA REPAIR      MOHS SURGERY      SKIN BIOPSY      basal cell     TONSILLECTOMY         ALLERGIES:  Allergies   Allergen Reactions    Other      Other reaction(s): itchy watery eyes  Other reaction(s): itchy watery eyes       SOCIAL HISTORY:  Social History     Substance and Sexual Activity   Alcohol Use No     Social History     Substance and Sexual Activity   Drug Use No     Social History     Tobacco Use   Smoking Status Never Smoker   Smokeless Tobacco Never Used       FAMILY HISTORY:  Family History   Problem Relation Age of Onset    COPD Mother     Heart failure Mother     Heart attack Father     Lung cancer Brother     No Known Problems Son     No Known Problems Daughter     No Known Problems Daughter        MEDICATIONS:    Current Outpatient Medications:     aspirin (ECOTRIN LOW STRENGTH) 81 mg EC tablet, Take 81 mg by mouth, Disp: , Rfl:     azelastine (ASTELIN) 0 1 % nasal spray, 1 spray into each nostril 2 (two) times a day Use in each nostril as directed, Disp: 1 Bottle, Rfl: 12    b complex vitamins tablet, Take 1 tablet by mouth daily, Disp: , Rfl:     Biotin w/ Vitamins C & E (HAIR/SKIN/NAILS) 1250-7 5-7 5 MCG-MG-UNT CHEW, Chew, Disp: , Rfl:     Coenzyme Q10 (CO Q10) 100 MG CAPS, Take by mouth, Disp: , Rfl:     donepezil (ARICEPT) 10 mg tablet, Take 1 tablet (10 mg total) by mouth daily at bedtime, Disp: 90 tablet, Rfl: 1    finasteride (PROSCAR) 5 mg tablet, TAKE 1 TABLET BY MOUTH EVERY DAY, Disp: 90 tablet, Rfl: 3    ipratropium (ATROVENT) 0 06 % nasal spray, 2 sprays into each nostril 4 (four) times a day, Disp: 15 mL, Rfl: 12    Magnesium 250 MG TABS, Take by mouth, Disp: , Rfl:     Multiple Vitamins-Minerals (MULTI-VITAMIN/MINERALS PO), Take by mouth, Disp: , Rfl:     Polyethyl Glyc-Propyl Glyc PF (Systane Preservative Free) 0 4-0 3 % SOLN, Apply 2 drops to eye 4 (four) times a day as needed (Dry eye) SYSTANE or CARLOS PRESERVATIVE FREE ARTIFICIAL TEARS, Disp: 120 each, Rfl: 11    rosuvastatin (CRESTOR) 5 mg tablet, TAKE 1 TABLET (5 MG TOTAL) BY MOUTH NIGHTLY , Disp: , Rfl:     losartan (COZAAR) 50 mg tablet, Take 1 tablet (50 mg total) by mouth daily, Disp: 30 tablet, Rfl: 3      PHYSICAL EXAM:  Vitals:    03/23/21 1053   BP: 128/80   BP Location: Left arm   Patient Position: Sitting   Cuff Size: Standard   Pulse: 59   Resp: 16   Weight: 63 9 kg (140 lb 12 8 oz)   Height: 5' 8" (1 727 m)     Body mass index is 21 41 kg/m²  Physical Exam  Constitutional:       General: He is not in acute distress  Appearance: He is well-developed  He is not diaphoretic  HENT:      Head: Normocephalic and atraumatic  Mouth/Throat:      Mouth: Mucous membranes are moist    Eyes:      General: No scleral icterus  Conjunctiva/sclera: Conjunctivae normal       Pupils: Pupils are equal, round, and reactive to light  Neck:      Musculoskeletal: Neck supple  Thyroid: No thyromegaly  Cardiovascular:      Rate and Rhythm: Normal rate and regular rhythm  Heart sounds: Normal heart sounds  No murmur  No friction rub  Pulmonary:      Effort: Pulmonary effort is normal  No respiratory distress  Breath sounds: Normal breath sounds  No wheezing or rales  Abdominal:      General: Bowel sounds are normal  There is no distension  Palpations: Abdomen is soft  Tenderness: There is no abdominal tenderness  Musculoskeletal:         General: No deformity  Right lower leg: No edema  Left lower leg: No edema  Lymphadenopathy:      Cervical: No cervical adenopathy  Skin:     Coloration: Skin is not pale  Nails: There is no clubbing  Neurological:      Mental Status: He is alert and oriented to person, place, and time  He is not disoriented  Psychiatric:         Mood and Affect: Mood normal  Mood is not anxious  Affect is not inappropriate  Behavior: Behavior normal          Thought Content:  Thought content normal            Lab Results:   Results for orders placed or performed in visit on 03/23/21   POCT urine dip   Result Value Ref Range    LEUKOCYTE ESTERASE,UA negative     NITRITE,UA negative     SL AMB POCT UROBILINOGEN 0 2     POCT URINE PROTEIN +      PH,UA 5 0     BLOOD,UA negative     SPECIFIC GRAVITY,UA 1 030     KETONES,UA negative     BILIRUBIN,UA negative     GLUCOSE, UA negative      COLOR,UA yellow CLARITY,UA clear              Invalid input(s): ALBUMIN     Lab Results:         Invalid input(s): ALBUMIN    Laboratory Results:  Lab Results   Component Value Date    WBC 6 56 12/30/2020    HGB 15 2 12/30/2020    HCT 46 4 12/30/2020    MCV 95 12/30/2020     12/30/2020     Lab Results   Component Value Date    SODIUM 140 12/30/2020    K 5 1 12/30/2020     12/30/2020    CO2 32 12/30/2020    BUN 23 12/30/2020    CREATININE 1 45 (H) 12/30/2020    GLUC 88 10/21/2020    CALCIUM 9 6 12/30/2020     Lab Results   Component Value Date    CALCIUM 9 6 12/30/2020    PHOS 3 7 12/30/2020     No results found for: LABPROT  [unfilled]  Lab Results   Component Value Date    CALCIUM 9 6 12/30/2020    PHOS 3 7 12/30/2020     [unfilled]      Portions of the record may have been created with voice recognition software  Occasional wrong word or "sound a like" substitutions may have occurred due to the inherent limitations of voice recognition software  Read the chart carefully and recognize, using context, where substitutions have occurred

## 2021-03-23 NOTE — PATIENT INSTRUCTIONS
Blood pressure is stable but due to protein in the urine adjustment made to blood pressure medication  Stop amlodipine, HCTZ and potassium chloride and started on losartan 50 mg daily     -Recommend 2 g sodium diet  -Recommend daily exercise and weight loss  -Discussed home monitoring of BP and maintaining a log of blood pressure   -Recommend goal BP less than 135/80  Please inform me if SBP below 110 or above 140's persistently  -Renal Function is stable  -You have Chronic Kidney Disease Stage 3  -Avoid NSAIDs like Ibuprofen/Motrin, Naproxen/Aleve, Celebrex, meloxicam/Mobic, Diclofenac and other NSAIDs   -Okay to take Acetaminophen/Tylenol if you do not have any liver problems  -Avoid IV contrast used for CT scan and cardiac catheterization     -If plan for any study with IV contrast, please let me know so we could hydrate with fluids before and after IV contrast  -Dosage  of certain medications may need to be adjusted depending on Kidney function  Plan to check blood work today to monitor renal function and electrolytes  Repeat blood work in 2-3 weeks  Will check kidney ultrasound in 6 months to monitor kidney cyst  Follow-up in 3 months with repeat blood work    Chronic Kidney Disease   AMBULATORY CARE:   Chronic kidney disease (CKD)  is the gradual and permanent loss of kidney function  Normally, the kidneys remove fluid, chemicals, and waste from your blood  These wastes are turned into urine by your kidneys  CKD may worsen over time and lead to kidney failure  Common signs and symptoms include the following:   · Changes in how often you need to urinate    · Swelling in your arms, legs, or feet    · Shortness of breath    · Fatigue or weakness    · Bad or bitter taste in your mouth    · Nausea, vomiting, or loss of appetite    Call your local emergency number (911 in the [address] [place] [address],[address]) if:   · You have a seizure  · You have shortness of breath      Call your doctor or nephrologist if:   · You are confused and very drowsy  · You suddenly gain or lose more weight than your healthcare provider has told you is okay  · You have itchy skin or a rash  · You urinate more or less than you normally do  · You have blood in your urine  · You have nausea and are vomiting  · You have fatigue or muscle weakness  · You have hiccups that will not stop  · You have questions or concerns about your condition or care  How CKD is diagnosed:  CKD has 5 stages  Your healthcare provider will use results from the following tests to find the stage of CKD you have:  · Blood and urine tests  show how well your kidneys are working  They may also show the cause of your CKD  · Ultrasound, CT scan, or MRI  pictures may be used to check your kidneys  You may be given contrast liquid to help your kidneys show up better in the pictures  Tell the healthcare provider if you have ever had an allergic reaction to contrast liquid  Do not enter the MRI room with anything metal  Metal can cause serious injury  Tell the healthcare provider if you have any metal in or on your body  · A biopsy  is a procedure to remove a small piece of tissue from your kidney  It is done to find the cause of your CKD  Treatment  can help control signs and symptoms, and prevent a worse stage of CKD  Your care team may include specialists, such as a dietitian or a heart specialist  This depends on the stage of your CKD and if you have other health conditions to manage  Healthcare providers will work with you to create a plan based on your decisions for treatment  Your treatment plan may include any of the following:  · Medicines  may be given to decrease your blood pressure and get rid of extra fluid  You may also receive medicine to manage health conditions that may occur with CKD, such as anemia, diabetes, and heart disease      · Dialysis  is a treatment to remove chemicals and waste from your blood when your kidneys can no longer do this     · Surgery  may be needed to create an arteriovenous fistula (AVF) in your arm or insert a catheter into your abdomen  This is done so you can receive dialysis  · A kidney transplant  may be done if your CKD becomes severe  What you can do to manage CKD: Management may include making some lifestyle changes  Tell your healthcare provider if you have any concerns about being able to make the changes  He or she can help you find solutions, including working with specialists  Ask for help creating a plan to break large goals into smaller steps  Your plan may include any of the following:  · Manage other health conditions  Your healthcare provider will work with you to make a care plan that meets your needs  You will be checked regularly for heart disease or other conditions that can make CKD worse, such as diabetes  Your blood pressure will be closely monitored  You will also get a target blood pressure and help making a plan to reach your target  This may include taking your blood pressure at home  · Maintain a healthy weight  Extra weight can strain your kidneys  Ask what a healthy weight is for you  Your provider can help you create a weight loss plan if you are overweight  · Create an exercise plan  Regular exercise can help you manage CKD, high blood pressure, and diabetes  Exercise also helps control weight  Your provider can help you create exercise goals and a plan to reach those goals  For example, your goal may be to exercise for 30 minutes in a day  Your plan can include breaking exercise into 10 minute sessions, 3 times during the day  · Create a healthy eating plan  Your provider may tell you to eat food low in sodium (salt), potassium, phosphorus, or protein  A dietitian can help you plan meals if needed  Ask how much liquid to drink each day and which liquids are best for you  · Limit alcohol as directed    Alcohol can cause fluid retention and can affect your kidneys  Ask how much alcohol is safe for you  A drink of alcohol is 12 ounces of beer, 5 ounces of wine, or 1½ ounces of liquor  · Do not smoke  Nicotine and other chemicals in cigarettes and cigars can cause kidney damage  Ask your provider for information if you currently smoke and need help to quit  E-cigarettes or smokeless tobacco still contain nicotine  Talk to your provider before you use these products  · Ask about over-the-counter medicines  Medicines such as NSAIDs and laxatives may harm your kidneys  Some cough and cold medicines can raise your blood pressure  Always ask if a medicine is safe before you take it  · Ask about vaccines you may need  Infections such as pneumonia, influenza, and hepatitis can be more harmful or more likely to occur in a person who has CKD  Vaccines lower your risk for infection  Follow up with your doctor as directed: You will need to return for tests to monitor your kidney and nerve function, and your parathyroid hormone level  Your medicines may be changed, based on certain test results  You may also be referred to a nephrologist (kidney specialist)  Write down your questions so you remember to ask them during your visits  © Copyright [address] [place] [address] Drive Information is for End User's use only and may not be sold, redistributed or otherwise used for commercial purposes  All illustrations and images included in CareNotes® are the copyrighted property of [employer]  or [facility]   The above information is an  only  It is not intended as medical advice for individual conditions or treatments  Talk to your doctor, nurse or pharmacist before following any medical regimen to see if it is safe and effective for you

## 2021-03-24 ENCOUNTER — APPOINTMENT (OUTPATIENT)
Dept: LAB | Age: 81
End: 2021-03-24
Payer: COMMERCIAL

## 2021-03-24 DIAGNOSIS — N18.31 STAGE 3A CHRONIC KIDNEY DISEASE (HCC): ICD-10-CM

## 2021-03-24 LAB
ANION GAP SERPL CALCULATED.3IONS-SCNC: 4 MMOL/L (ref 4–13)
BUN SERPL-MCNC: 25 MG/DL (ref 5–25)
CALCIUM SERPL-MCNC: 9.3 MG/DL (ref 8.3–10.1)
CHLORIDE SERPL-SCNC: 103 MMOL/L (ref 100–108)
CO2 SERPL-SCNC: 33 MMOL/L (ref 21–32)
CREAT SERPL-MCNC: 1.52 MG/DL (ref 0.6–1.3)
GFR SERPL CREATININE-BSD FRML MDRD: 42 ML/MIN/1.73SQ M
GLUCOSE P FAST SERPL-MCNC: 96 MG/DL (ref 65–99)
POTASSIUM SERPL-SCNC: 4.1 MMOL/L (ref 3.5–5.3)
SODIUM SERPL-SCNC: 140 MMOL/L (ref 136–145)

## 2021-03-24 PROCEDURE — 36415 COLL VENOUS BLD VENIPUNCTURE: CPT

## 2021-03-24 PROCEDURE — 80048 BASIC METABOLIC PNL TOTAL CA: CPT

## 2021-03-24 PROCEDURE — 84166 PROTEIN E-PHORESIS/URINE/CSF: CPT | Performed by: PATHOLOGY

## 2021-03-24 PROCEDURE — 84166 PROTEIN E-PHORESIS/URINE/CSF: CPT

## 2021-03-24 PROCEDURE — 83883 ASSAY NEPHELOMETRY NOT SPEC: CPT

## 2021-03-25 ENCOUNTER — TELEPHONE (OUTPATIENT)
Dept: NEPHROLOGY | Facility: CLINIC | Age: 81
End: 2021-03-25

## 2021-03-25 LAB
ALBUMIN UR ELPH-MCNC: 100 %
ALPHA1 GLOB MFR UR ELPH: 0 %
ALPHA2 GLOB MFR UR ELPH: 0 %
B-GLOBULIN MFR UR ELPH: 0 %
GAMMA GLOB MFR UR ELPH: 0 %
KAPPA LC FREE SER-MCNC: 31.4 MG/L (ref 3.3–19.4)
KAPPA LC FREE/LAMBDA FREE SER: 1.19 {RATIO} (ref 0.26–1.65)
LAMBDA LC FREE SERPL-MCNC: 26.4 MG/L (ref 5.7–26.3)
PROT PATTERN UR ELPH-IMP: ABNORMAL
PROT UR-MCNC: 59 MG/DL

## 2021-03-25 NOTE — TELEPHONE ENCOUNTER
Renal function stable at creatinine 1 52, electrolytes are stable, SPEP UPEP light chain ratio does not suggest paraproteinemia, K/L ratio 1 19     continue treatment, medications were changed during office visit, repeat labs in 2-3 weeks as discussed, blood pressure at home is pretty stable at 518Y systolic

## 2021-04-06 ENCOUNTER — APPOINTMENT (OUTPATIENT)
Dept: LAB | Age: 81
End: 2021-04-06
Payer: COMMERCIAL

## 2021-04-06 DIAGNOSIS — N18.31 STAGE 3A CHRONIC KIDNEY DISEASE (HCC): ICD-10-CM

## 2021-04-06 LAB
ANION GAP SERPL CALCULATED.3IONS-SCNC: 4 MMOL/L (ref 4–13)
BUN SERPL-MCNC: 17 MG/DL (ref 5–25)
CALCIUM SERPL-MCNC: 9.2 MG/DL (ref 8.3–10.1)
CHLORIDE SERPL-SCNC: 108 MMOL/L (ref 100–108)
CO2 SERPL-SCNC: 30 MMOL/L (ref 21–32)
CREAT SERPL-MCNC: 1.51 MG/DL (ref 0.6–1.3)
GFR SERPL CREATININE-BSD FRML MDRD: 43 ML/MIN/1.73SQ M
GLUCOSE P FAST SERPL-MCNC: 91 MG/DL (ref 65–99)
POTASSIUM SERPL-SCNC: 4.2 MMOL/L (ref 3.5–5.3)
SODIUM SERPL-SCNC: 142 MMOL/L (ref 136–145)

## 2021-04-06 PROCEDURE — 80048 BASIC METABOLIC PNL TOTAL CA: CPT

## 2021-04-06 PROCEDURE — 36415 COLL VENOUS BLD VENIPUNCTURE: CPT

## 2021-04-07 ENCOUNTER — TELEPHONE (OUTPATIENT)
Dept: NEPHROLOGY | Facility: CLINIC | Age: 81
End: 2021-04-07

## 2021-04-07 NOTE — TELEPHONE ENCOUNTER
Discussed labs with patient- labs stable  Cr 1 5  BP well controlled and is at goal  Continue losartan

## 2021-04-16 DIAGNOSIS — G89.29 CHRONIC MIDLINE LOW BACK PAIN WITHOUT SCIATICA: Primary | ICD-10-CM

## 2021-04-16 DIAGNOSIS — M54.50 CHRONIC MIDLINE LOW BACK PAIN WITHOUT SCIATICA: Primary | ICD-10-CM

## 2021-04-19 ENCOUNTER — APPOINTMENT (OUTPATIENT)
Dept: RADIOLOGY | Age: 81
End: 2021-04-19
Payer: COMMERCIAL

## 2021-04-19 DIAGNOSIS — G89.29 CHRONIC MIDLINE LOW BACK PAIN WITHOUT SCIATICA: ICD-10-CM

## 2021-04-19 DIAGNOSIS — M54.50 CHRONIC MIDLINE LOW BACK PAIN WITHOUT SCIATICA: ICD-10-CM

## 2021-04-19 PROCEDURE — 72110 X-RAY EXAM L-2 SPINE 4/>VWS: CPT

## 2021-06-04 ENCOUNTER — APPOINTMENT (OUTPATIENT)
Dept: LAB | Age: 81
End: 2021-06-04
Payer: COMMERCIAL

## 2021-06-04 DIAGNOSIS — N18.31 STAGE 3A CHRONIC KIDNEY DISEASE (HCC): ICD-10-CM

## 2021-06-04 DIAGNOSIS — I12.9 BENIGN HYPERTENSION WITH CHRONIC KIDNEY DISEASE, STAGE III (HCC): ICD-10-CM

## 2021-06-04 DIAGNOSIS — N18.30 BENIGN HYPERTENSION WITH CHRONIC KIDNEY DISEASE, STAGE III (HCC): ICD-10-CM

## 2021-06-04 DIAGNOSIS — R80.1 PERSISTENT PROTEINURIA: ICD-10-CM

## 2021-06-04 LAB
ANION GAP SERPL CALCULATED.3IONS-SCNC: 5 MMOL/L (ref 4–13)
BACTERIA UR QL AUTO: ABNORMAL /HPF
BILIRUB UR QL STRIP: NEGATIVE
BUN SERPL-MCNC: 21 MG/DL (ref 5–25)
CALCIUM SERPL-MCNC: 9.6 MG/DL (ref 8.3–10.1)
CHLORIDE SERPL-SCNC: 108 MMOL/L (ref 100–108)
CLARITY UR: ABNORMAL
CO2 SERPL-SCNC: 31 MMOL/L (ref 21–32)
COLOR UR: ABNORMAL
CREAT SERPL-MCNC: 1.31 MG/DL (ref 0.6–1.3)
CREAT UR-MCNC: 336 MG/DL
ERYTHROCYTE [DISTWIDTH] IN BLOOD BY AUTOMATED COUNT: 14.6 % (ref 11.6–15.1)
GFR SERPL CREATININE-BSD FRML MDRD: 51 ML/MIN/1.73SQ M
GLUCOSE P FAST SERPL-MCNC: 91 MG/DL (ref 65–99)
GLUCOSE UR STRIP-MCNC: NEGATIVE MG/DL
HCT VFR BLD AUTO: 43.6 % (ref 36.5–49.3)
HGB BLD-MCNC: 14.5 G/DL (ref 12–17)
HGB UR QL STRIP.AUTO: NEGATIVE
HYALINE CASTS #/AREA URNS LPF: ABNORMAL /LPF
KETONES UR STRIP-MCNC: ABNORMAL MG/DL
LEUKOCYTE ESTERASE UR QL STRIP: NEGATIVE
MAGNESIUM SERPL-MCNC: 2.3 MG/DL (ref 1.6–2.6)
MCH RBC QN AUTO: 31.9 PG (ref 26.8–34.3)
MCHC RBC AUTO-ENTMCNC: 33.3 G/DL (ref 31.4–37.4)
MCV RBC AUTO: 96 FL (ref 82–98)
NITRITE UR QL STRIP: NEGATIVE
NON-SQ EPI CELLS URNS QL MICRO: ABNORMAL /HPF
PH UR STRIP.AUTO: 5.5 [PH]
PHOSPHATE SERPL-MCNC: 2.8 MG/DL (ref 2.3–4.1)
PLATELET # BLD AUTO: 145 THOUSANDS/UL (ref 149–390)
PMV BLD AUTO: 12 FL (ref 8.9–12.7)
POTASSIUM SERPL-SCNC: 4.2 MMOL/L (ref 3.5–5.3)
PROT UR STRIP-MCNC: ABNORMAL MG/DL
PROT UR-MCNC: 116 MG/DL
PROT/CREAT UR: 0.35 MG/G{CREAT} (ref 0–0.1)
PTH-INTACT SERPL-MCNC: 79.4 PG/ML (ref 18.4–80.1)
RBC # BLD AUTO: 4.54 MILLION/UL (ref 3.88–5.62)
RBC #/AREA URNS AUTO: ABNORMAL /HPF
SODIUM SERPL-SCNC: 144 MMOL/L (ref 136–145)
SP GR UR STRIP.AUTO: 1.03 (ref 1–1.03)
UROBILINOGEN UR QL STRIP.AUTO: 0.2 E.U./DL
WBC # BLD AUTO: 6.98 THOUSAND/UL (ref 4.31–10.16)
WBC #/AREA URNS AUTO: ABNORMAL /HPF

## 2021-06-04 PROCEDURE — 84100 ASSAY OF PHOSPHORUS: CPT

## 2021-06-04 PROCEDURE — 84156 ASSAY OF PROTEIN URINE: CPT

## 2021-06-04 PROCEDURE — 81001 URINALYSIS AUTO W/SCOPE: CPT

## 2021-06-04 PROCEDURE — 83970 ASSAY OF PARATHORMONE: CPT

## 2021-06-04 PROCEDURE — 82570 ASSAY OF URINE CREATININE: CPT

## 2021-06-04 PROCEDURE — 80048 BASIC METABOLIC PNL TOTAL CA: CPT

## 2021-06-04 PROCEDURE — 83735 ASSAY OF MAGNESIUM: CPT

## 2021-06-04 PROCEDURE — 36415 COLL VENOUS BLD VENIPUNCTURE: CPT

## 2021-06-04 PROCEDURE — 85027 COMPLETE CBC AUTOMATED: CPT

## 2021-06-05 DIAGNOSIS — N18.30 BENIGN HYPERTENSION WITH CHRONIC KIDNEY DISEASE, STAGE III (HCC): ICD-10-CM

## 2021-06-05 DIAGNOSIS — I12.9 BENIGN HYPERTENSION WITH CHRONIC KIDNEY DISEASE, STAGE III (HCC): ICD-10-CM

## 2021-06-06 RX ORDER — LOSARTAN POTASSIUM 50 MG/1
TABLET ORAL
Qty: 90 TABLET | Refills: 1 | Status: SHIPPED | OUTPATIENT
Start: 2021-06-06 | End: 2021-10-24

## 2021-06-17 ENCOUNTER — OFFICE VISIT (OUTPATIENT)
Dept: NEPHROLOGY | Facility: CLINIC | Age: 81
End: 2021-06-17
Payer: COMMERCIAL

## 2021-06-17 VITALS
HEART RATE: 60 BPM | DIASTOLIC BLOOD PRESSURE: 80 MMHG | RESPIRATION RATE: 18 BRPM | HEIGHT: 68 IN | WEIGHT: 137 LBS | BODY MASS INDEX: 20.76 KG/M2 | SYSTOLIC BLOOD PRESSURE: 118 MMHG

## 2021-06-17 DIAGNOSIS — N40.1 BPH WITH OBSTRUCTION/LOWER URINARY TRACT SYMPTOMS: ICD-10-CM

## 2021-06-17 DIAGNOSIS — N28.1 BILATERAL RENAL CYSTS: ICD-10-CM

## 2021-06-17 DIAGNOSIS — N18.31 STAGE 3A CHRONIC KIDNEY DISEASE (HCC): Primary | ICD-10-CM

## 2021-06-17 DIAGNOSIS — I12.9 BENIGN HYPERTENSION WITH CHRONIC KIDNEY DISEASE, STAGE III (HCC): ICD-10-CM

## 2021-06-17 DIAGNOSIS — N13.8 BPH WITH OBSTRUCTION/LOWER URINARY TRACT SYMPTOMS: ICD-10-CM

## 2021-06-17 DIAGNOSIS — N18.30 BENIGN HYPERTENSION WITH CHRONIC KIDNEY DISEASE, STAGE III (HCC): ICD-10-CM

## 2021-06-17 DIAGNOSIS — R80.1 PERSISTENT PROTEINURIA: ICD-10-CM

## 2021-06-17 PROCEDURE — 3074F SYST BP LT 130 MM HG: CPT | Performed by: INTERNAL MEDICINE

## 2021-06-17 PROCEDURE — 3079F DIAST BP 80-89 MM HG: CPT | Performed by: INTERNAL MEDICINE

## 2021-06-17 PROCEDURE — 1036F TOBACCO NON-USER: CPT | Performed by: INTERNAL MEDICINE

## 2021-06-17 PROCEDURE — 99214 OFFICE O/P EST MOD 30 MIN: CPT | Performed by: INTERNAL MEDICINE

## 2021-06-17 PROCEDURE — 1160F RVW MEDS BY RX/DR IN RCRD: CPT | Performed by: INTERNAL MEDICINE

## 2021-06-17 NOTE — PROGRESS NOTES
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  - renal ultrasound in December 2020 did not show any hydronephrosis  - SPEP from December 2020 was negative for monoclonal protein, UPEP was negative for monoclonal protein and kappa lambda ratio was 1 19   -Plan:   · Continue to monitor renal function  Continue losartan at current dose  · Follow-up in 6 months with repeat labs  · Magnesium level within normal limit okay to continue magnesium tablet  · Will discuss about Kidney Smart education if renal function worsens in future     Primary Hypertension:   -Current medication: losartan 50 mg daily   Previously was on amlodipine and HCTZ which was stopped during initial office visit with me and was started on losartan 50 mg daily   -Current blood pressure:  blood pressure has improved and is at goal   -Plan:    ? 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  -continue losartan 50 mg daily  -continue to monitor proteinuria with repeat labs in 6 months      Bilateral renal cyst  - renal ultrasound in December 2020 suggestive of   right kidney with simple appearing upper pole cyst measuring 3 x 2 5 x 2 6 cm and simple cortical cyst measuring 2 1 x 2 5 cm in size, and other simple cyst measuring 2 1 x 1 5 cm in size and finding of mild parapelvic cyst measuring 1 9 cm in size   left kidney showed upper pole cyst measuring 5 9 x 5 3 cm and a contiguous cyst measuring 5 1 x 4 cm in size with finding of flow within the septations  - plan for repeat renal ultrasound in  6 months to monitor renal cyst   Ultrasound has been scheduled for September, will follow up the result   -also recommended discussion with Urology       BPH with lower urinary tract symptoms  - follows with Urology and was last seen by Urology nurse practitioner on January 13, 2021   Noted plan to continue finasteride and to maintain adequate hydration and follow-up in 1 year  Had digital rectal exam which revealed prostate size 45-50 g  - renal ultrasound from December suggestive of prostatomegaly measuring 5 1 cm in size with a mass effect on the bladder base  -continue follow-up with Urology  Continue Proscar/finasteride   -still complaining of excessive urination despite stopping HCTZ  Continue current medications  Recommend discussion with urologist   Next appointment in January 2022     Hyperlipidemia:  On Crestor 5 mg  -last lipid panel from November 2020 showed LDL 94 which is at goal less than 100  Continue monitoring and management per PCP, recommended daily exercise        Patient Instructions   Blood pressure is stable and at goal   Continue losartan   -Recommend 2 g sodium diet      -Recommend daily exercise and weight loss  -Discussed home monitoring of BP and maintaining a log of blood pressure   -Recommend goal BP less than 130/80  Please inform me if SBP below 110 or above 140's persistently  -Renal Function is stable  -You have Chronic Kidney Disease Stage 3  -Avoid NSAIDs like Ibuprofen/Motrin, Naproxen/Aleve, Celebrex, meloxicam/Mobic, Diclofenac and other NSAIDs   -Okay to take Acetaminophen/Tylenol if you do not have any liver problems  -Avoid IV contrast used for CT scan and cardiac catheterization  Follow-up in 6 months with repeat blood work  Diagnoses and all orders for this visit:    Stage 3a chronic kidney disease ([address] )  -     Basic metabolic panel; Future  -     Protein / creatinine ratio, urine; Future  -     Magnesium; Future  -     Urinalysis with microscopic; Future  -     PTH, intact; Future    Benign hypertension with chronic kidney disease, stage III (HCC)  -     Basic metabolic panel; Future    Persistent proteinuria  -     Protein / creatinine ratio, urine; Future    Bilateral renal cysts    BPH with obstruction/lower urinary tract symptoms            SUBJECTIVE / HPI:  Hung Gannon is a 79 y o   male with medical issues of  Hypertension x , coronary artery disease, Raynaud's syndrome, BPH, hyperlipidemia who presents for initial consultation for  Chronic kidney disease stage 3  patient has elevated creatinine 1 3-1 5 since 2016 when it was 1 32  In the year 2020 creatinine was around 1 47  Last blood work from December 30, 2020 showed creatinine 1 45  Most likely baseline creatinine is around 1 4-1  5     As per patient he has been on same blood pressure medicine for a long time, does not check blood pressure at home but has been pretty stable  Denies any lower extremity edema but complain of excessive urination during the daytime and only twice during the nighttime  Has been following with urologist, denies any urine retention    Takes NSAIDs only occasionally Renal function stable at creatinine 1 52, electrolytes are stable, SPEP UPEP light chain ratio does not suggest paraproteinemia, K/L ratio 1 19    During initial office visit with me, amlodipine was stopped, HCTZ was stopped and patient was started on losartan 50 mg daily to help with proteinuria and since then blood pressure has been well controlled, does not have any lower extremity edema  Does complain of excessive urination    Labs from 06/04/2021 showed renal function improving to creatinine 1 3 with stable electrolytes  PTH within normal limit, upc ratio 0 35  BP at home 120-130/70's  REVIEW OF SYSTEMS:    Review of Systems   Constitutional: Negative for activity change, appetite change, chills, diaphoresis, fatigue and fever  HENT: Negative for congestion, facial swelling and nosebleeds  Eyes: Negative for pain and visual disturbance  Respiratory: Negative for cough, chest tightness and shortness of breath  Cardiovascular: Negative for chest pain and palpitations  Gastrointestinal: Negative for abdominal distention, abdominal pain, diarrhea, nausea and vomiting  Genitourinary: Positive for frequency  Negative for difficulty urinating, dysuria, flank pain and hematuria  Musculoskeletal: Negative for arthralgias, back pain and joint swelling  Skin: Negative for rash  Neurological: Negative for dizziness, seizures, syncope, weakness and headaches  Psychiatric/Behavioral: Negative for agitation and confusion  The patient is not nervous/anxious  More than 10 point review of systems were obtained and discussed in length with the patient  Complete review of systems were negative / unremarkable except mentioned above  PAST MEDICAL HISTORY:  Past Medical History:   Diagnosis Date    Basal cell carcinoma     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     Coronary artery disease     CAD s/p stent 1995    Elevated PSA     R   11/16/17    Hypertension     Wears glasses        PAST SURGICAL HISTORY:  Past Surgical History:   Procedure Laterality Date    COLONOSCOPY      INGUINAL HERNIA REPAIR      MOHS SURGERY      SKIN BIOPSY      basal cell     TONSILLECTOMY         SOCIAL HISTORY:  Social History     Substance and Sexual Activity   Alcohol Use No     Social History     Substance and Sexual Activity   Drug Use No     Social History     Tobacco Use   Smoking Status Never Smoker   Smokeless Tobacco Never Used       FAMILY HISTORY:  Family History   Problem Relation Age of Onset    COPD Mother     Heart failure Mother     Heart attack Father     Lung cancer Brother     No Known Problems Son     No Known Problems Daughter     No Known Problems Daughter        MEDICATIONS:    Current Outpatient Medications:     aspirin (ECOTRIN LOW STRENGTH) 81 mg EC tablet, Take 81 mg by mouth, Disp: , Rfl:     azelastine (ASTELIN) 0 1 % nasal spray, 1 spray into each nostril 2 (two) times a day Use in each nostril as directed, Disp: 1 Bottle, Rfl: 12    b complex vitamins tablet, Take 1 tablet by mouth daily, Disp: , Rfl:     Biotin w/ Vitamins C & E (HAIR/SKIN/NAILS) 1250-7 5-7 5 MCG-MG-UNT CHEW, Chew, Disp: , Rfl:     Coenzyme Q10 (CO Q10) 100 MG CAPS, Take by mouth, Disp: , Rfl:     donepezil (ARICEPT) 10 mg tablet, Take 1 tablet (10 mg total) by mouth daily at bedtime, Disp: 90 tablet, Rfl: 1    finasteride (PROSCAR) 5 mg tablet, TAKE 1 TABLET BY MOUTH EVERY DAY, Disp: 90 tablet, Rfl: 3    ipratropium (ATROVENT) 0 06 % nasal spray, 2 sprays into each nostril 4 (four) times a day, Disp: 15 mL, Rfl: 12    losartan (COZAAR) 50 mg tablet, TAKE 1 TABLET BY MOUTH EVERY DAY, Disp: 90 tablet, Rfl: 1    Magnesium 250 MG TABS, Take by mouth, Disp: , Rfl:     Multiple Vitamins-Minerals (MULTI-VITAMIN/MINERALS PO), Take by mouth, Disp: , Rfl:     Polyethyl Glyc-Propyl Glyc PF (Systane Preservative Free) 0 4-0 3 % SOLN, Apply 2 drops to eye 4 (four) times a day as needed (Dry eye) SYSTANE or CARLOS PRESERVATIVE FREE ARTIFICIAL TEARS, Disp: 120 each, Rfl: 11    rosuvastatin (CRESTOR) 5 mg tablet, TAKE 1 TABLET (5 MG TOTAL) BY MOUTH NIGHTLY , Disp: , Rfl:       PHYSICAL EXAM:  Vitals:    06/17/21 1025   BP: 118/80   BP Location: Left arm   Patient Position: Sitting   Cuff Size: Standard   Pulse: 60   Resp: 18   Weight: 62 1 kg (137 lb)   Height: 5' 8" (1 727 m)     Body mass index is 20 83 kg/m²  Physical Exam  Constitutional:       General: He is not in acute distress  Appearance: He is well-developed  He is not diaphoretic  HENT:      Head: Normocephalic and atraumatic  Mouth/Throat:      Mouth: Mucous membranes are moist    Eyes:      General: No scleral icterus  Conjunctiva/sclera: Conjunctivae normal       Pupils: Pupils are equal, round, and reactive to light  Neck:      Thyroid: No thyromegaly  Cardiovascular:      Rate and Rhythm: Normal rate and regular rhythm  Heart sounds: Normal heart sounds  No murmur heard  No friction rub  Pulmonary:      Effort: Pulmonary effort is normal  No respiratory distress  Breath sounds: Normal breath sounds  No wheezing or rales  Abdominal:      General: Bowel sounds are normal  There is no distension  Palpations: Abdomen is soft  Tenderness: There is no abdominal tenderness  Musculoskeletal:         General: No deformity  Cervical back: Neck supple  Right lower leg: No edema  Left lower leg: No edema  Lymphadenopathy:      Cervical: No cervical adenopathy  Skin:     Coloration: Skin is not pale  Nails: There is no clubbing  Neurological:      Mental Status: He is alert and oriented to person, place, and time  He is not disoriented  Psychiatric:         Mood and Affect: Mood normal  Mood is not anxious  Affect is not inappropriate  Behavior: Behavior normal          Thought Content:  Thought content normal          Lab Results:   Results for orders placed or performed in visit on 27/39/94   Basic metabolic panel   Result Value Ref Range    Sodium 144 136 - 145 mmol/L Potassium 4 2 3 5 - 5 3 mmol/L    Chloride 108 100 - 108 mmol/L    CO2 31 21 - 32 mmol/L    ANION GAP 5 4 - 13 mmol/L    BUN 21 5 - 25 mg/dL    Creatinine 1 31 (H) 0 60 - 1 30 mg/dL    Glucose, Fasting 91 65 - 99 mg/dL    Calcium 9 6 8 3 - 10 1 mg/dL    eGFR 51 ml/min/1 73sq m   Phosphorus   Result Value Ref Range    Phosphorus 2 8 2 3 - 4 1 mg/dL   Magnesium   Result Value Ref Range    Magnesium 2 3 1 6 - 2 6 mg/dL   Protein / creatinine ratio, urine   Result Value Ref Range    Creatinine, Ur 336 0 mg/dL    Protein Urine Random 116 mg/dL    Prot/Creat Ratio, Ur 0 35 (H) 0 00 - 0 10   Urinalysis with microscopic   Result Value Ref Range    Clarity, UA Cloudy     Color, UA Dk Yellow     Specific Gravity, UA 1 029 1 003 - 1 030    pH, UA 5 5 4 5, 5 0, 5 5, 6 0, 6 5, 7 0, 7 5, 8 0    Glucose, UA Negative Negative mg/dl    Ketones, UA Trace (A) Negative mg/dl    Blood, UA Negative Negative    Protein,  (2+) (A) Negative mg/dl    Nitrite, UA Negative Negative    Bilirubin, UA Negative Negative    Urobilinogen, UA 0 2 0 2, 1 0 E U /dl E U /dl    Leukocytes, UA Negative Negative    WBC, UA 2-4 None Seen, 2-4 /hpf    RBC, UA None Seen None Seen, 2-4 /hpf    Hyaline Casts, UA 3-5 (A) None Seen /lpf    Bacteria, UA None Seen None Seen, Occasional /hpf    Epithelial Cells None Seen None Seen, Occasional /hpf   PTH, intact   Result Value Ref Range    PTH 79 4 18 4 - 80 1 pg/mL   CBC   Result Value Ref Range    WBC 6 98 4 31 - 10 16 Thousand/uL    RBC 4 54 3 88 - 5 62 Million/uL    Hemoglobin 14 5 12 0 - 17 0 g/dL    Hematocrit 43 6 36 5 - 49 3 %    MCV 96 82 - 98 fL    MCH 31 9 26 8 - 34 3 pg    MCHC 33 3 31 4 - 37 4 g/dL    RDW 14 6 11 6 - 15 1 %    Platelets 851 (L) 718 - 390 Thousands/uL    MPV 12 0 8 9 - 12 7 fL

## 2021-06-17 NOTE — PATIENT INSTRUCTIONS
Blood pressure is stable and at goal   Continue losartan   -Recommend 2 g sodium diet  -Recommend daily exercise and weight loss  -Discussed home monitoring of BP and maintaining a log of blood pressure   -Recommend goal BP less than 130/80  Please inform me if SBP below 110 or above 140's persistently  -Renal Function is stable  -You have Chronic Kidney Disease Stage 3  -Avoid NSAIDs like Ibuprofen/Motrin, Naproxen/Aleve, Celebrex, meloxicam/Mobic, Diclofenac and other NSAIDs   -Okay to take Acetaminophen/Tylenol if you do not have any liver problems  -Avoid IV contrast used for CT scan and cardiac catheterization  Follow-up in 6 months with repeat blood work

## 2021-07-26 ENCOUNTER — TELEPHONE (OUTPATIENT)
Dept: FAMILY MEDICINE CLINIC | Facility: CLINIC | Age: 81
End: 2021-07-26

## 2021-07-26 ENCOUNTER — RA CDI HCC (OUTPATIENT)
Dept: OTHER | Facility: HOSPITAL | Age: 81
End: 2021-07-26

## 2021-07-26 NOTE — PROGRESS NOTES
[facility]  coding opportunities          Chart reviewed, no opportunity found: CHART REVIEWED, NO OPPORTUNITY FOUND      not seeing enough to ask for malnutrition at this time- no documentation of this or rx-bmi is normal                Patients insurance company: [employer] (Medicare Advantage and Commercial)

## 2021-07-26 NOTE — TELEPHONE ENCOUNTER
Patient's wife called and stated that her  has had the hiccups since last Monday and all the home remedies aren't helping him  They also went to the pharmacy and the pharmacist told the patient to try omeprazole because it could possibly be from GERD  They tried that and it didn't help  The patient is on vacation and is very uncomfortable, also is having trouble sleeping because of it    Please advise

## 2021-07-30 RX ORDER — OMEPRAZOLE 20 MG/1
20 CAPSULE, DELAYED RELEASE ORAL DAILY
COMMUNITY
Start: 2021-07-22 | End: 2022-01-05

## 2021-07-30 NOTE — PROGRESS NOTES
Assessment & Plan:     I10 Essential hypertension  I have evaluated the patient and found the condition to be: Stable  I have evaluated the patient and: Recommended continue with same treatment plan    I12 9,N18 30 Benign hypertension with chronic kidney disease, stage III ([facility] [address]   I have evaluated the patient and found the condition to be: Stable  I have evaluated the patient and: Recommended continue with same treatment plan  The patient should continue treatment and follow-up with:  nephrology    E78 5 Hyperlipidemia  I have evaluated the patient and found the condition to be: Stable  I have evaluated the patient and: Recommended continue with same treatment plan    R80 1 Persistent proteinuria  I have evaluated the patient and found the condition to be: Stable  I have evaluated the patient and: Recommended continue with same treatment plan  The patient should continue treatment and follow-up with:  nephrology    N40 1,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  I have evaluated the patient and found the condition to be: Stable  I have evaluated the patient and: Recommended continue with same treatment plan  The patient should continue treatment and follow-up with:  urology      Depression Screening and Follow-up Plan: Clincally patient does not have depression  No treatment is required  Subjective:     Patient ID: Luis Shah is a 79 y o  male     Chief Complaint   Patient presents with    Follow-up     Routine 6 months      HPI   patient history of mild cognitive deficit on Aricept 10 mg a day hypertension hyperlipidemia is here to follow-up on chronic medical problems as well as concerned about episodes of hiccups that lasted about 7 days    patient was seen in urgent care PPI as well prescribed  Patient denies any dyspepsia  Denies any cough but has been experiencing some congestion and sputum production    He has been prescribed antihistamine by his allergist   Denies any fever chills  Blood pressure is good today  His wife is concerned of occasional episode more confusion  No aggressive behavior  She has not seen decline in any other executive function  Patient's appetite has been  Not the greatest   No depressive signs on talking to the patient or his wife  Review of Systems   Constitutional: Negative for chills and fever  Respiratory: Negative for cough and shortness of breath  Sputum production   Cardiovascular: Negative for chest pain, palpitations and leg swelling  Gastrointestinal: Negative for abdominal pain  Musculoskeletal: Positive for back pain  Neurological: Negative for dizziness  Objective:      /80 (BP Location: Left arm, Patient Position: Sitting, Cuff Size: Adult)   Pulse 57   Temp 97 5 °F (36 4 °C)   Resp 18   Ht 5' 8" (1 727 m)   Wt 61 4 kg (135 lb 6 4 oz)   SpO2 98%   BMI 20 59 kg/m²     Problem List Items Addressed This Visit        Cardiovascular and Mediastinum    Hypertension - Primary    Relevant Orders    Comprehensive metabolic panel (Completed)       Other    Hyperlipidemia    Relevant Orders    Lipid panel (Completed)      Other Visit Diagnoses     Occupational exposure in workplace        Relevant Orders    XR chest pa & lateral    Abnormal amount of sputum        Relevant Orders    XR chest pa & lateral    Mild cognitive disorder        Hiccups            DC PPI  Patient has been experiencing more mucus production  Significant occupational exposure  Chest x-ray would be  ordered      Physical Exam  Abdominal:      General: There is no distension  Tenderness: There is no abdominal tenderness  There is no guarding  Neurological:      Mental Status: He is alert and oriented to person, place, and time     Psychiatric:         Mood and Affect: Mood normal          Behavior: Behavior normal

## 2021-08-02 ENCOUNTER — APPOINTMENT (OUTPATIENT)
Dept: LAB | Age: 81
End: 2021-08-02
Payer: COMMERCIAL

## 2021-08-02 ENCOUNTER — OFFICE VISIT (OUTPATIENT)
Dept: FAMILY MEDICINE CLINIC | Facility: CLINIC | Age: 81
End: 2021-08-02
Payer: COMMERCIAL

## 2021-08-02 ENCOUNTER — APPOINTMENT (OUTPATIENT)
Dept: RADIOLOGY | Age: 81
End: 2021-08-02
Payer: COMMERCIAL

## 2021-08-02 VITALS
WEIGHT: 135.4 LBS | SYSTOLIC BLOOD PRESSURE: 124 MMHG | HEART RATE: 57 BPM | BODY MASS INDEX: 20.52 KG/M2 | RESPIRATION RATE: 18 BRPM | HEIGHT: 68 IN | TEMPERATURE: 97.5 F | DIASTOLIC BLOOD PRESSURE: 80 MMHG | OXYGEN SATURATION: 98 %

## 2021-08-02 DIAGNOSIS — R09.3 ABNORMAL AMOUNT OF SPUTUM: ICD-10-CM

## 2021-08-02 DIAGNOSIS — Z57.9 OCCUPATIONAL EXPOSURE IN WORKPLACE: ICD-10-CM

## 2021-08-02 DIAGNOSIS — R06.6 HICCUPS: ICD-10-CM

## 2021-08-02 DIAGNOSIS — I10 HYPERTENSION, UNSPECIFIED TYPE: Primary | ICD-10-CM

## 2021-08-02 DIAGNOSIS — E78.5 HYPERLIPIDEMIA, UNSPECIFIED HYPERLIPIDEMIA TYPE: ICD-10-CM

## 2021-08-02 DIAGNOSIS — F09 MILD COGNITIVE DISORDER: ICD-10-CM

## 2021-08-02 LAB
ALBUMIN SERPL BCP-MCNC: 3.5 G/DL (ref 3.5–5)
ALP SERPL-CCNC: 63 U/L (ref 46–116)
ALT SERPL W P-5'-P-CCNC: 26 U/L (ref 12–78)
ANION GAP SERPL CALCULATED.3IONS-SCNC: 6 MMOL/L (ref 4–13)
AST SERPL W P-5'-P-CCNC: 22 U/L (ref 5–45)
BILIRUB SERPL-MCNC: 0.6 MG/DL (ref 0.2–1)
BUN SERPL-MCNC: 22 MG/DL (ref 5–25)
CALCIUM SERPL-MCNC: 8.7 MG/DL (ref 8.3–10.1)
CHLORIDE SERPL-SCNC: 108 MMOL/L (ref 100–108)
CHOLEST SERPL-MCNC: 146 MG/DL (ref 50–200)
CO2 SERPL-SCNC: 29 MMOL/L (ref 21–32)
CREAT SERPL-MCNC: 1.36 MG/DL (ref 0.6–1.3)
GFR SERPL CREATININE-BSD FRML MDRD: 48 ML/MIN/1.73SQ M
GLUCOSE P FAST SERPL-MCNC: 90 MG/DL (ref 65–99)
HDLC SERPL-MCNC: 44 MG/DL
LDLC SERPL CALC-MCNC: 86 MG/DL (ref 0–100)
NONHDLC SERPL-MCNC: 102 MG/DL
POTASSIUM SERPL-SCNC: 3.8 MMOL/L (ref 3.5–5.3)
PROT SERPL-MCNC: 6.9 G/DL (ref 6.4–8.2)
SODIUM SERPL-SCNC: 143 MMOL/L (ref 136–145)
TRIGL SERPL-MCNC: 78 MG/DL

## 2021-08-02 PROCEDURE — 99214 OFFICE O/P EST MOD 30 MIN: CPT | Performed by: INTERNAL MEDICINE

## 2021-08-02 PROCEDURE — 3074F SYST BP LT 130 MM HG: CPT | Performed by: INTERNAL MEDICINE

## 2021-08-02 PROCEDURE — 71046 X-RAY EXAM CHEST 2 VIEWS: CPT

## 2021-08-02 PROCEDURE — 80053 COMPREHEN METABOLIC PANEL: CPT | Performed by: INTERNAL MEDICINE

## 2021-08-02 PROCEDURE — 1160F RVW MEDS BY RX/DR IN RCRD: CPT | Performed by: INTERNAL MEDICINE

## 2021-08-02 PROCEDURE — 36415 COLL VENOUS BLD VENIPUNCTURE: CPT | Performed by: INTERNAL MEDICINE

## 2021-08-02 PROCEDURE — T1015 CLINIC SERVICE: HCPCS | Performed by: INTERNAL MEDICINE

## 2021-08-02 PROCEDURE — 3079F DIAST BP 80-89 MM HG: CPT | Performed by: INTERNAL MEDICINE

## 2021-08-02 PROCEDURE — 1036F TOBACCO NON-USER: CPT | Performed by: INTERNAL MEDICINE

## 2021-08-02 PROCEDURE — 80061 LIPID PANEL: CPT | Performed by: INTERNAL MEDICINE

## 2021-08-02 SDOH — HEALTH STABILITY - PHYSICAL HEALTH: OCCUPATIONAL EXPOSURE TO UNSPECIFIED RISK FACTOR: Z57.9

## 2021-08-20 DIAGNOSIS — F09 MILD COGNITIVE DISORDER: ICD-10-CM

## 2021-08-20 RX ORDER — DONEPEZIL HYDROCHLORIDE 10 MG/1
TABLET, FILM COATED ORAL
Qty: 90 TABLET | Refills: 1 | Status: SHIPPED | OUTPATIENT
Start: 2021-08-20 | End: 2022-02-14

## 2021-09-01 ENCOUNTER — HOSPITAL ENCOUNTER (OUTPATIENT)
Dept: RADIOLOGY | Age: 81
Discharge: HOME | End: 2021-09-01
Payer: COMMERCIAL

## 2021-09-01 DIAGNOSIS — N18.31 STAGE 3A CHRONIC KIDNEY DISEASE (HCC): ICD-10-CM

## 2021-09-01 DIAGNOSIS — N28.1 BILATERAL RENAL CYSTS: ICD-10-CM

## 2021-09-01 DIAGNOSIS — N13.8 BPH WITH OBSTRUCTION/LOWER URINARY TRACT SYMPTOMS: ICD-10-CM

## 2021-09-01 DIAGNOSIS — N40.1 BPH WITH OBSTRUCTION/LOWER URINARY TRACT SYMPTOMS: ICD-10-CM

## 2021-09-01 PROCEDURE — 76770 US EXAM ABDO BACK WALL COMP: CPT

## 2021-09-09 ENCOUNTER — TELEPHONE (OUTPATIENT)
Dept: NEPHROLOGY | Facility: CLINIC | Age: 81
End: 2021-09-09

## 2021-09-09 NOTE — TELEPHONE ENCOUNTER
Spoke with patient to discuss the following:  Renal ultrasound is benign  It showed bilateral renal cysts which are not concerning  Enlarged prostate for which the patient already sees urology  Patient verbally understood and had no further questions for me at this time

## 2021-09-09 NOTE — TELEPHONE ENCOUNTER
----- Message from Castro Little sent at 9/9/2021 11:07 AM EDT -----    ----- Message -----  [facility]  Sent: 9/9/2021   7:50 AM EDT  To: [facility] [place] [facility]    Please let patient know that renal ultrasound is benign  It showed bilateral renal cysts which are not concerning  Enlarged prostate for which the patient already sees urology

## 2021-10-12 ENCOUNTER — CLINICAL SUPPORT (OUTPATIENT)
Dept: FAMILY MEDICINE CLINIC | Facility: CLINIC | Age: 81
End: 2021-10-12
Payer: COMMERCIAL

## 2021-10-12 DIAGNOSIS — Z23 NEED FOR IMMUNIZATION AGAINST INFLUENZA: Primary | ICD-10-CM

## 2021-10-12 PROCEDURE — G0008 ADMIN INFLUENZA VIRUS VAC: HCPCS

## 2021-10-12 PROCEDURE — 90662 IIV NO PRSV INCREASED AG IM: CPT

## 2021-10-20 ENCOUNTER — TELEPHONE (OUTPATIENT)
Dept: NEPHROLOGY | Facility: CLINIC | Age: 81
End: 2021-10-20

## 2021-10-23 DIAGNOSIS — N18.30 BENIGN HYPERTENSION WITH CHRONIC KIDNEY DISEASE, STAGE III (HCC): ICD-10-CM

## 2021-10-23 DIAGNOSIS — I12.9 BENIGN HYPERTENSION WITH CHRONIC KIDNEY DISEASE, STAGE III (HCC): ICD-10-CM

## 2021-10-24 RX ORDER — LOSARTAN POTASSIUM 50 MG/1
TABLET ORAL
Qty: 90 TABLET | Refills: 1 | Status: SHIPPED | OUTPATIENT
Start: 2021-10-24 | End: 2022-05-24

## 2021-11-02 ENCOUNTER — OFFICE VISIT (OUTPATIENT)
Dept: FAMILY MEDICINE CLINIC | Facility: CLINIC | Age: 81
End: 2021-11-02
Payer: COMMERCIAL

## 2021-11-02 VITALS
TEMPERATURE: 96.5 F | HEIGHT: 68 IN | BODY MASS INDEX: 20.88 KG/M2 | DIASTOLIC BLOOD PRESSURE: 70 MMHG | WEIGHT: 137.8 LBS | HEART RATE: 57 BPM | SYSTOLIC BLOOD PRESSURE: 120 MMHG | OXYGEN SATURATION: 97 %

## 2021-11-02 DIAGNOSIS — N18.32 STAGE 3B CHRONIC KIDNEY DISEASE (HCC): ICD-10-CM

## 2021-11-02 DIAGNOSIS — K40.90 RIGHT INGUINAL HERNIA: Primary | ICD-10-CM

## 2021-11-02 DIAGNOSIS — Z00.00 MEDICARE ANNUAL WELLNESS VISIT, SUBSEQUENT: ICD-10-CM

## 2021-11-02 DIAGNOSIS — Z13.29 THYROID DISORDER SCREENING: ICD-10-CM

## 2021-11-02 DIAGNOSIS — I25.10 CORONARY ARTERY DISEASE INVOLVING NATIVE CORONARY ARTERY OF NATIVE HEART WITHOUT ANGINA PECTORIS: ICD-10-CM

## 2021-11-02 DIAGNOSIS — I10 HYPERTENSION, UNSPECIFIED TYPE: ICD-10-CM

## 2021-11-02 PROCEDURE — 3078F DIAST BP <80 MM HG: CPT | Performed by: INTERNAL MEDICINE

## 2021-11-02 PROCEDURE — 99214 OFFICE O/P EST MOD 30 MIN: CPT | Performed by: INTERNAL MEDICINE

## 2021-11-02 PROCEDURE — 1101F PT FALLS ASSESS-DOCD LE1/YR: CPT | Performed by: INTERNAL MEDICINE

## 2021-11-02 PROCEDURE — 1036F TOBACCO NON-USER: CPT | Performed by: INTERNAL MEDICINE

## 2021-11-02 PROCEDURE — 3074F SYST BP LT 130 MM HG: CPT | Performed by: INTERNAL MEDICINE

## 2021-11-02 PROCEDURE — 1160F RVW MEDS BY RX/DR IN RCRD: CPT | Performed by: INTERNAL MEDICINE

## 2021-11-02 PROCEDURE — 1170F FXNL STATUS ASSESSED: CPT | Performed by: INTERNAL MEDICINE

## 2021-11-02 PROCEDURE — 1125F AMNT PAIN NOTED PAIN PRSNT: CPT | Performed by: INTERNAL MEDICINE

## 2021-11-02 PROCEDURE — 3288F FALL RISK ASSESSMENT DOCD: CPT | Performed by: INTERNAL MEDICINE

## 2021-11-02 PROCEDURE — 3725F SCREEN DEPRESSION PERFORMED: CPT | Performed by: INTERNAL MEDICINE

## 2021-11-02 PROCEDURE — G0439 PPPS, SUBSEQ VISIT: HCPCS | Performed by: INTERNAL MEDICINE

## 2021-12-03 ENCOUNTER — CONSULT (OUTPATIENT)
Dept: SURGERY | Facility: CLINIC | Age: 81
End: 2021-12-03
Payer: COMMERCIAL

## 2021-12-03 VITALS
TEMPERATURE: 97.2 F | BODY MASS INDEX: 22 KG/M2 | HEIGHT: 67 IN | SYSTOLIC BLOOD PRESSURE: 142 MMHG | WEIGHT: 140.2 LBS | HEART RATE: 56 BPM | DIASTOLIC BLOOD PRESSURE: 90 MMHG

## 2021-12-03 DIAGNOSIS — K40.90 RIGHT INGUINAL HERNIA: Primary | ICD-10-CM

## 2021-12-03 DIAGNOSIS — N18.31 STAGE 3A CHRONIC KIDNEY DISEASE (HCC): ICD-10-CM

## 2021-12-03 DIAGNOSIS — Z01.818 PREOP TESTING: ICD-10-CM

## 2021-12-03 DIAGNOSIS — I25.10 CORONARY ARTERY DISEASE INVOLVING NATIVE CORONARY ARTERY OF NATIVE HEART WITHOUT ANGINA PECTORIS: ICD-10-CM

## 2021-12-03 DIAGNOSIS — N13.8 BPH WITH OBSTRUCTION/LOWER URINARY TRACT SYMPTOMS: ICD-10-CM

## 2021-12-03 DIAGNOSIS — N40.1 BPH WITH OBSTRUCTION/LOWER URINARY TRACT SYMPTOMS: ICD-10-CM

## 2021-12-03 PROCEDURE — 1160F RVW MEDS BY RX/DR IN RCRD: CPT | Performed by: SURGERY

## 2021-12-03 PROCEDURE — 3080F DIAST BP >= 90 MM HG: CPT | Performed by: SURGERY

## 2021-12-03 PROCEDURE — 99204 OFFICE O/P NEW MOD 45 MIN: CPT | Performed by: SURGERY

## 2021-12-03 PROCEDURE — 3077F SYST BP >= 140 MM HG: CPT | Performed by: SURGERY

## 2021-12-03 PROCEDURE — 1036F TOBACCO NON-USER: CPT | Performed by: SURGERY

## 2021-12-10 ENCOUNTER — LAB (OUTPATIENT)
Dept: LAB | Age: 81
End: 2021-12-10
Payer: COMMERCIAL

## 2021-12-10 DIAGNOSIS — Z01.818 PREOP TESTING: ICD-10-CM

## 2021-12-10 DIAGNOSIS — N18.31 STAGE 3A CHRONIC KIDNEY DISEASE (HCC): ICD-10-CM

## 2021-12-10 DIAGNOSIS — I12.9 BENIGN HYPERTENSION WITH CHRONIC KIDNEY DISEASE, STAGE III (HCC): ICD-10-CM

## 2021-12-10 DIAGNOSIS — N18.30 BENIGN HYPERTENSION WITH CHRONIC KIDNEY DISEASE, STAGE III (HCC): ICD-10-CM

## 2021-12-10 DIAGNOSIS — R80.1 PERSISTENT PROTEINURIA: ICD-10-CM

## 2021-12-10 LAB
ALBUMIN SERPL BCP-MCNC: 3.7 G/DL (ref 3.5–5)
ALP SERPL-CCNC: 78 U/L (ref 46–116)
ALT SERPL W P-5'-P-CCNC: 27 U/L (ref 12–78)
ANION GAP SERPL CALCULATED.3IONS-SCNC: 4 MMOL/L (ref 4–13)
AST SERPL W P-5'-P-CCNC: 24 U/L (ref 5–45)
BASOPHILS # BLD AUTO: 0.06 THOUSANDS/ΜL (ref 0–0.1)
BASOPHILS NFR BLD AUTO: 1 % (ref 0–1)
BILIRUB SERPL-MCNC: 0.63 MG/DL (ref 0.2–1)
BUN SERPL-MCNC: 22 MG/DL (ref 5–25)
CALCIUM SERPL-MCNC: 10.7 MG/DL (ref 8.3–10.1)
CHLORIDE SERPL-SCNC: 108 MMOL/L (ref 100–108)
CHOLEST SERPL-MCNC: 170 MG/DL
CO2 SERPL-SCNC: 30 MMOL/L (ref 21–32)
CREAT SERPL-MCNC: 1.52 MG/DL (ref 0.6–1.3)
EOSINOPHIL # BLD AUTO: 0.23 THOUSAND/ΜL (ref 0–0.61)
EOSINOPHIL NFR BLD AUTO: 4 % (ref 0–6)
ERYTHROCYTE [DISTWIDTH] IN BLOOD BY AUTOMATED COUNT: 14.1 % (ref 11.6–15.1)
GFR SERPL CREATININE-BSD FRML MDRD: 42 ML/MIN/1.73SQ M
GLUCOSE P FAST SERPL-MCNC: 98 MG/DL (ref 65–99)
HCT VFR BLD AUTO: 45.9 % (ref 36.5–49.3)
HDLC SERPL-MCNC: 42 MG/DL
HGB BLD-MCNC: 15.1 G/DL (ref 12–17)
IMM GRANULOCYTES # BLD AUTO: 0.01 THOUSAND/UL (ref 0–0.2)
IMM GRANULOCYTES NFR BLD AUTO: 0 % (ref 0–2)
LDLC SERPL CALC-MCNC: 107 MG/DL (ref 0–100)
LYMPHOCYTES # BLD AUTO: 1.74 THOUSANDS/ΜL (ref 0.6–4.47)
LYMPHOCYTES NFR BLD AUTO: 28 % (ref 14–44)
MAGNESIUM SERPL-MCNC: 2.5 MG/DL (ref 1.6–2.6)
MCH RBC QN AUTO: 32.1 PG (ref 26.8–34.3)
MCHC RBC AUTO-ENTMCNC: 32.9 G/DL (ref 31.4–37.4)
MCV RBC AUTO: 98 FL (ref 82–98)
MONOCYTES # BLD AUTO: 0.65 THOUSAND/ΜL (ref 0.17–1.22)
MONOCYTES NFR BLD AUTO: 10 % (ref 4–12)
NEUTROPHILS # BLD AUTO: 3.54 THOUSANDS/ΜL (ref 1.85–7.62)
NEUTS SEG NFR BLD AUTO: 57 % (ref 43–75)
NONHDLC SERPL-MCNC: 128 MG/DL
NRBC BLD AUTO-RTO: 0 /100 WBCS
PLATELET # BLD AUTO: 161 THOUSANDS/UL (ref 149–390)
PMV BLD AUTO: 11.6 FL (ref 8.9–12.7)
POTASSIUM SERPL-SCNC: 4.7 MMOL/L (ref 3.5–5.3)
PROT SERPL-MCNC: 7.2 G/DL (ref 6.4–8.2)
RBC # BLD AUTO: 4.7 MILLION/UL (ref 3.88–5.62)
SODIUM SERPL-SCNC: 142 MMOL/L (ref 136–145)
TRIGL SERPL-MCNC: 104 MG/DL
TSH SERPL DL<=0.05 MIU/L-ACNC: 2.96 UIU/ML (ref 0.36–3.74)
WBC # BLD AUTO: 6.23 THOUSAND/UL (ref 4.31–10.16)

## 2021-12-10 PROCEDURE — 83970 ASSAY OF PARATHORMONE: CPT

## 2021-12-10 PROCEDURE — 80053 COMPREHEN METABOLIC PANEL: CPT

## 2021-12-10 PROCEDURE — 81001 URINALYSIS AUTO W/SCOPE: CPT

## 2021-12-10 PROCEDURE — 36415 COLL VENOUS BLD VENIPUNCTURE: CPT | Performed by: INTERNAL MEDICINE

## 2021-12-10 PROCEDURE — 84443 ASSAY THYROID STIM HORMONE: CPT | Performed by: INTERNAL MEDICINE

## 2021-12-10 PROCEDURE — 80061 LIPID PANEL: CPT | Performed by: INTERNAL MEDICINE

## 2021-12-10 PROCEDURE — 83735 ASSAY OF MAGNESIUM: CPT

## 2021-12-10 PROCEDURE — 82570 ASSAY OF URINE CREATININE: CPT

## 2021-12-10 PROCEDURE — 84156 ASSAY OF PROTEIN URINE: CPT

## 2021-12-10 PROCEDURE — 85025 COMPLETE CBC W/AUTO DIFF WBC: CPT | Performed by: INTERNAL MEDICINE

## 2021-12-11 LAB
BACTERIA UR QL AUTO: NORMAL /HPF
BILIRUB UR QL STRIP: NEGATIVE
CLARITY UR: CLEAR
COLOR UR: YELLOW
CREAT UR-MCNC: 211 MG/DL
GLUCOSE UR STRIP-MCNC: NEGATIVE MG/DL
HGB UR QL STRIP.AUTO: NEGATIVE
HYALINE CASTS #/AREA URNS LPF: NORMAL /LPF
KETONES UR STRIP-MCNC: NEGATIVE MG/DL
LEUKOCYTE ESTERASE UR QL STRIP: NEGATIVE
NITRITE UR QL STRIP: NEGATIVE
NON-SQ EPI CELLS URNS QL MICRO: NORMAL /HPF
PH UR STRIP.AUTO: 6 [PH]
PROT UR STRIP-MCNC: NEGATIVE MG/DL
PROT UR-MCNC: 48 MG/DL
PROT/CREAT UR: 0.23 MG/G{CREAT} (ref 0–0.1)
PTH-INTACT SERPL-MCNC: 71.3 PG/ML (ref 18.4–80.1)
RBC #/AREA URNS AUTO: NORMAL /HPF
SP GR UR STRIP.AUTO: 1.02 (ref 1–1.03)
UROBILINOGEN UR QL STRIP.AUTO: 0.2 E.U./DL
WBC #/AREA URNS AUTO: NORMAL /HPF

## 2021-12-13 ENCOUNTER — TELEPHONE (OUTPATIENT)
Dept: NEPHROLOGY | Facility: CLINIC | Age: 81
End: 2021-12-13

## 2021-12-13 DIAGNOSIS — R80.1 PERSISTENT PROTEINURIA: ICD-10-CM

## 2021-12-13 DIAGNOSIS — I12.9 BENIGN HYPERTENSION WITH CHRONIC KIDNEY DISEASE, STAGE III (HCC): ICD-10-CM

## 2021-12-13 DIAGNOSIS — E83.52 SERUM CALCIUM ELEVATED: ICD-10-CM

## 2021-12-13 DIAGNOSIS — N18.30 BENIGN HYPERTENSION WITH CHRONIC KIDNEY DISEASE, STAGE III (HCC): ICD-10-CM

## 2021-12-13 DIAGNOSIS — N18.31 STAGE 3A CHRONIC KIDNEY DISEASE (HCC): Primary | ICD-10-CM

## 2021-12-14 ENCOUNTER — HOSPITAL ENCOUNTER (OUTPATIENT)
Dept: RADIOLOGY | Age: 81
Discharge: HOME | End: 2021-12-14
Payer: COMMERCIAL

## 2021-12-14 DIAGNOSIS — K40.90 RIGHT INGUINAL HERNIA: ICD-10-CM

## 2021-12-14 PROCEDURE — 74177 CT ABD & PELVIS W/CONTRAST: CPT

## 2021-12-14 RX ADMIN — IOHEXOL 100 ML: 350 INJECTION, SOLUTION INTRAVENOUS at 10:57

## 2021-12-23 DIAGNOSIS — R93.3 ABNORMAL CT SCAN, SMALL BOWEL: Primary | ICD-10-CM

## 2022-01-03 ENCOUNTER — TELEPHONE (OUTPATIENT)
Dept: NEPHROLOGY | Facility: CLINIC | Age: 82
End: 2022-01-03

## 2022-01-03 ENCOUNTER — APPOINTMENT (OUTPATIENT)
Dept: LAB | Age: 82
End: 2022-01-03
Payer: COMMERCIAL

## 2022-01-03 DIAGNOSIS — E83.52 SERUM CALCIUM ELEVATED: ICD-10-CM

## 2022-01-03 DIAGNOSIS — I12.9 BENIGN HYPERTENSION WITH CHRONIC KIDNEY DISEASE, STAGE III (HCC): ICD-10-CM

## 2022-01-03 DIAGNOSIS — N18.31 STAGE 3A CHRONIC KIDNEY DISEASE (HCC): ICD-10-CM

## 2022-01-03 DIAGNOSIS — N18.30 BENIGN HYPERTENSION WITH CHRONIC KIDNEY DISEASE, STAGE III (HCC): ICD-10-CM

## 2022-01-03 DIAGNOSIS — R80.1 PERSISTENT PROTEINURIA: ICD-10-CM

## 2022-01-03 LAB
ANION GAP SERPL CALCULATED.3IONS-SCNC: 2 MMOL/L (ref 4–13)
BUN SERPL-MCNC: 24 MG/DL (ref 5–25)
CA-I BLD-SCNC: 1.22 MMOL/L (ref 1.12–1.32)
CALCIUM SERPL-MCNC: 9.4 MG/DL (ref 8.3–10.1)
CHLORIDE SERPL-SCNC: 110 MMOL/L (ref 100–108)
CO2 SERPL-SCNC: 30 MMOL/L (ref 21–32)
CREAT SERPL-MCNC: 1.46 MG/DL (ref 0.6–1.3)
GFR SERPL CREATININE-BSD FRML MDRD: 44 ML/MIN/1.73SQ M
GLUCOSE SERPL-MCNC: 95 MG/DL (ref 65–140)
POTASSIUM SERPL-SCNC: 4.8 MMOL/L (ref 3.5–5.3)
SODIUM SERPL-SCNC: 142 MMOL/L (ref 136–145)

## 2022-01-03 PROCEDURE — 36415 COLL VENOUS BLD VENIPUNCTURE: CPT

## 2022-01-03 PROCEDURE — 82330 ASSAY OF CALCIUM: CPT

## 2022-01-03 PROCEDURE — 80048 BASIC METABOLIC PNL TOTAL CA: CPT

## 2022-01-03 NOTE — TELEPHONE ENCOUNTER
I spoke with patient, states they will go for repeat lab/urine studies prior to follow up  appointment-going through [address]  Thank you

## 2022-01-05 ENCOUNTER — OFFICE VISIT (OUTPATIENT)
Dept: NEPHROLOGY | Facility: CLINIC | Age: 82
End: 2022-01-05
Payer: COMMERCIAL

## 2022-01-05 VITALS
SYSTOLIC BLOOD PRESSURE: 120 MMHG | HEART RATE: 58 BPM | HEIGHT: 67 IN | WEIGHT: 139.8 LBS | BODY MASS INDEX: 21.94 KG/M2 | DIASTOLIC BLOOD PRESSURE: 70 MMHG

## 2022-01-05 DIAGNOSIS — N18.30 BENIGN HYPERTENSION WITH CHRONIC KIDNEY DISEASE, STAGE III (HCC): ICD-10-CM

## 2022-01-05 DIAGNOSIS — N28.1 BILATERAL RENAL CYSTS: ICD-10-CM

## 2022-01-05 DIAGNOSIS — N13.8 BPH WITH OBSTRUCTION/LOWER URINARY TRACT SYMPTOMS: ICD-10-CM

## 2022-01-05 DIAGNOSIS — N40.1 BPH WITH OBSTRUCTION/LOWER URINARY TRACT SYMPTOMS: ICD-10-CM

## 2022-01-05 DIAGNOSIS — N18.32 STAGE 3B CHRONIC KIDNEY DISEASE (HCC): Primary | ICD-10-CM

## 2022-01-05 DIAGNOSIS — I12.9 BENIGN HYPERTENSION WITH CHRONIC KIDNEY DISEASE, STAGE III (HCC): ICD-10-CM

## 2022-01-05 DIAGNOSIS — R80.1 PERSISTENT PROTEINURIA: ICD-10-CM

## 2022-01-05 PROCEDURE — 99214 OFFICE O/P EST MOD 30 MIN: CPT | Performed by: INTERNAL MEDICINE

## 2022-01-05 NOTE — PROGRESS NOTES
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  - renal ultrasound in December 2020 did not show any hydronephrosis  - SPEP from December 2020 was negative for monoclonal protein, UPEP was negative for monoclonal protein and kappa lambda ratio was 1 19   -Plan:   · Continue to monitor renal function  Continue losartan at current dose  · Follow-up in 6 months with repeat labs  · Magnesium level within normal limit  Patient not sure why taking magnesium supplement, agreeable to stop as magnesium level trending up to 2 5, will continue to monitor magnesium level  · Refer to Kidney education class      Primary Hypertension with CKD stage 3 :   -Current medication: losartan 50 mg daily   Previously was on amlodipine and HCTZ which was stopped during initial office visit with me and was started on losartan 50 mg daily   -Current blood pressure: BP stable and at goal on repeat check    -Plan:    ?  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  -continue losartan 50 mg daily  -continue to monitor proteinuria with repeat labs in 6 months      Bilateral renal cyst  - renal ultrasound in December 2020 suggestive of   right kidney with simple appearing upper pole cyst measuring 3 x 2 5 x 2 6 cm and simple cortical cyst measuring 2 1 x 2 5 cm in size, and other simple cyst measuring 2 1 x 1 5 cm in size and finding of mild parapelvic cyst measuring 1 9 cm in size   left kidney showed upper pole cyst measuring 5 9 x 5 3 cm and a contiguous cyst measuring 5 1 x 4 cm in size with finding of flow within the septations  -renal ultrasound from September 2021 suggestive of right kidney with simple cortical and parapelvic cyst measuring 2 9 cm in size  Left kidney with 2 adjacent cyst versus single cyst with thin septation  One of the cyst measuring 5 4 x 6 4 cm in size and other 1 measuring 4 7 x 5 6 cm in size   -she underwent CT abdomen pelvis with contrast in December 2021 for suspected inguinal hernia, CT scan results reviewed, finding of bilateral simple cyst largest measuring 6 cm on the left and 3 cm on the right  -no need for further monitoring as CT abdomen suggestive of simple cyst   -also recommended discussion with Urology      [facility] with lower urinary tract symptoms  - follows with Urology and was last seen by Urology nurse practitioner on January 13, 2021   Noted plan to continue finasteride and to maintain adequate hydration and follow-up in 1 year   Had digital rectal exam which revealed prostate size 45-50 g  - renal ultrasound suggestive of enlarged Prostate  -continue follow-up with Urology     Continue Proscar/finasteride   -next appointment later on this month with Urology     Hyperlipidemia:  On Crestor 5 mg  -last lipid panel from December 2021 showed  which is at goal HDL 42    Continue daily exercise        Patient Instructions   -Renal Function is stable  -You have Chronic Kidney Disease Stage 3  -Avoid NSAIDs like Ibuprofen/Motrin, Naproxen/Aleve, Celebrex, meloxicam/Mobic, Diclofenac and other NSAIDs   -Okay to take Acetaminophen/Tylenol if you do not have any liver problems  -Avoid IV contrast used for CT scan and cardiac catheterization     -If plan for any study with IV contrast, please let me know so we could hydrate with fluids before and after IV contrast  -Dosage  of certain medications may need to be adjusted depending on Kidney function  Blood pressure is stable, continue same medication   -Recommend 2 g sodium diet  -Recommend daily exercise and weight loss  -Discussed home monitoring of BP and maintaining a log of blood pressure   -Recommend goal BP less than 130/80  Please inform me if SBP below 110 or above 140's persistently  Follow-up in 6 months with repeat blood work and urine test  Okay to see AP  Diagnoses and all orders for this visit:    Stage 3b chronic kidney disease ([facility] [address] )  -     Basic metabolic panel; Future  -     Protein / creatinine ratio, urine; Future  -     PTH, intact; Future  -     Phosphorus; Future  -     Magnesium; Future  -     CBC; Future  -     Ambulatory referral to ckd education program; Future    Benign hypertension with chronic kidney disease, stage III (HCC)  -     Basic metabolic panel; Future    Persistent proteinuria  -     Protein / creatinine ratio, urine; Future    Bilateral renal cysts    BPH with obstruction/lower urinary tract symptoms  -     Basic metabolic panel;  Future            SUBJECTIVE / HPI:  Alexus Moffett is a 79 y o   male with medical issues of  Hypertension x , coronary artery disease, Raynaud's syndrome, BPH, hyperlipidemia who presents for initial consultation for  Chronic kidney disease stage 3      Patient has elevated creatinine 1 3-1 5 since 2016 when it was 1 32   In the year 2020 creatinine was around 1 47   Last blood work from December 30, 2020 showed creatinine 1 45   Most likely baseline creatinine is around 1 4-1 5   SPEP UPEP light chain ratio does not suggest paraproteinemia, K/L ratio 1 19     During initial office visit with me, amlodipine was stopped, HCTZ was stopped and patient was started on losartan 50 mg daily to help with proteinuria and since then blood pressure has been well controlled, does not have any lower extremity edema  Reviewed last blood work from 01/03/2022: Renal function stable at creatinine 1 46 milligram per deciliter, potassium 4 8, bicarb 30, EGFR 44  , hemoglobin 15 1, PTH 71 3  Upc ratio improving to 0 23 from previous level of 0 35, UA without any RBCs     Blood pressure at home:  Not checked at home        REVIEW OF SYSTEMS:    Review of Systems   Constitutional: Negative for chills and fever  HENT: Negative for ear pain and sore throat  Eyes: Negative for pain and visual disturbance  Respiratory: Negative for cough and shortness of breath  Cardiovascular: Negative for chest pain and palpitations  Gastrointestinal: Negative for abdominal pain and vomiting  Genitourinary: Negative for dysuria and hematuria  Musculoskeletal: Negative for arthralgias and back pain  Skin: Negative for color change and rash  Neurological: Negative for seizures and syncope  All other systems reviewed and are negative  More than 10 point review of systems were obtained and discussed in length with the patient  Complete review of systems were negative / unremarkable except mentioned above  PAST MEDICAL HISTORY:  Past Medical History:   Diagnosis Date    Basal cell carcinoma     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     Coronary artery disease     CAD s/p stent 1995    Elevated PSA     R   11/16/17    Hypertension     Wears glasses        PAST SURGICAL HISTORY:  Past Surgical History:   Procedure Laterality Date    COLONOSCOPY      INGUINAL HERNIA REPAIR      MOHS SURGERY      SKIN BIOPSY      basal cell     TONSILLECTOMY SOCIAL HISTORY:  Social History     Substance and Sexual Activity   Alcohol Use No     Social History     Substance and Sexual Activity   Drug Use No     Social History     Tobacco Use   Smoking Status Never Smoker   Smokeless Tobacco Never Used       FAMILY HISTORY:  Family History   Problem Relation Age of Onset    COPD Mother     Heart failure Mother     Heart attack Father     Lung cancer Brother     No Known Problems Son     No Known Problems Daughter     No Known Problems Daughter        MEDICATIONS:    Current Outpatient Medications:     aspirin (ECOTRIN LOW STRENGTH) 81 mg EC tablet, Take 81 mg by mouth, Disp: , Rfl:     b complex vitamins tablet, Take 1 tablet by mouth daily, Disp: , Rfl:     Biotin w/ Vitamins C & E (HAIR/SKIN/NAILS) 1250-7 5-7 5 MCG-MG-UNT CHEW, Chew, Disp: , Rfl:     Coenzyme Q10 (CO Q10) 100 MG CAPS, Take by mouth, Disp: , Rfl:     donepezil (ARICEPT) 10 mg tablet, TAKE 1 TABLET BY MOUTH DAILY AT BEDTIME, Disp: 90 tablet, Rfl: 1    finasteride (PROSCAR) 5 mg tablet, TAKE 1 TABLET BY MOUTH EVERY DAY, Disp: 90 tablet, Rfl: 3    losartan (COZAAR) 50 mg tablet, TAKE 1 TABLET BY MOUTH EVERY DAY, Disp: 90 tablet, Rfl: 1    Multiple Vitamins-Minerals (MULTI-VITAMIN/MINERALS PO), Take by mouth, Disp: , Rfl:     rosuvastatin (CRESTOR) 5 mg tablet, TAKE 1 TABLET (5 MG TOTAL) BY MOUTH NIGHTLY , Disp: , Rfl:       PHYSICAL EXAM:  Vitals:    01/05/22 1024 01/05/22 1034   BP: 140/70 120/70   BP Location: Right arm    Patient Position: Sitting    Cuff Size: Adult    Pulse: 58    Weight: 63 4 kg (139 lb 12 8 oz)    Height: 5' 7" (1 702 m)      Body mass index is 21 9 kg/m²  Physical Exam  Constitutional:       General: He is not in acute distress  Appearance: He is well-developed  He is not diaphoretic  HENT:      Head: Normocephalic and atraumatic  Mouth/Throat:      Mouth: Mucous membranes are moist    Eyes:      General: No scleral icterus       Conjunctiva/sclera: Conjunctivae normal       Pupils: Pupils are equal, round, and reactive to light  Neck:      Thyroid: No thyromegaly  Cardiovascular:      Rate and Rhythm: Normal rate and regular rhythm  Heart sounds: Normal heart sounds  No murmur heard  No friction rub  Pulmonary:      Effort: Pulmonary effort is normal  No respiratory distress  Breath sounds: Normal breath sounds  No wheezing or rales  Abdominal:      General: Bowel sounds are normal  There is no distension  Palpations: Abdomen is soft  Tenderness: There is no abdominal tenderness  Musculoskeletal:         General: No deformity  Cervical back: Neck supple  Right lower leg: No edema  Left lower leg: No edema  Lymphadenopathy:      Cervical: No cervical adenopathy  Skin:     Coloration: Skin is not pale  Nails: There is no clubbing  Neurological:      Mental Status: He is alert and oriented to person, place, and time  He is not disoriented  Psychiatric:         Mood and Affect: Mood normal  Mood is not anxious  Affect is not inappropriate  Behavior: Behavior normal          Thought Content:  Thought content normal          Lab Results:   Results for orders placed or performed in visit on 95/43/98   Basic metabolic panel   Result Value Ref Range    Sodium 142 136 - 145 mmol/L    Potassium 4 8 3 5 - 5 3 mmol/L    Chloride 110 (H) 100 - 108 mmol/L    CO2 30 21 - 32 mmol/L    ANION GAP 2 (L) 4 - 13 mmol/L    BUN 24 5 - 25 mg/dL    Creatinine 1 46 (H) 0 60 - 1 30 mg/dL    Glucose 95 65 - 140 mg/dL    Calcium 9 4 8 3 - 10 1 mg/dL    eGFR 44 ml/min/1 73sq m   Calcium, ionized   Result Value Ref Range    Calcium, Ionized 1 22 1 12 - 1 32 mmol/L

## 2022-01-05 NOTE — PATIENT INSTRUCTIONS
-Renal Function is stable  -You have Chronic Kidney Disease Stage 3  -Avoid NSAIDs like Ibuprofen/Motrin, Naproxen/Aleve, Celebrex, meloxicam/Mobic, Diclofenac and other NSAIDs   -Okay to take Acetaminophen/Tylenol if you do not have any liver problems  -Avoid IV contrast used for CT scan and cardiac catheterization     -If plan for any study with IV contrast, please let me know so we could hydrate with fluids before and after IV contrast  -Dosage  of certain medications may need to be adjusted depending on Kidney function  Blood pressure is stable, continue same medication   -Recommend 2 g sodium diet  -Recommend daily exercise and weight loss  -Discussed home monitoring of BP and maintaining a log of blood pressure   -Recommend goal BP less than 130/80  Please inform me if SBP below 110 or above 140's persistently  Follow-up in 6 months with repeat blood work and urine test  Okay to see AP

## 2022-01-19 ENCOUNTER — OFFICE VISIT (OUTPATIENT)
Dept: GASTROENTEROLOGY | Facility: CLINIC | Age: 82
End: 2022-01-19
Attending: SURGERY
Payer: COMMERCIAL

## 2022-01-19 VITALS
DIASTOLIC BLOOD PRESSURE: 80 MMHG | HEIGHT: 67 IN | WEIGHT: 140 LBS | BODY MASS INDEX: 21.97 KG/M2 | SYSTOLIC BLOOD PRESSURE: 134 MMHG | HEART RATE: 56 BPM | TEMPERATURE: 99.7 F | OXYGEN SATURATION: 99 %

## 2022-01-19 DIAGNOSIS — R93.3 ABNORMAL CT SCAN, SMALL BOWEL: ICD-10-CM

## 2022-01-19 PROCEDURE — 99203 OFFICE O/P NEW LOW 30 MIN: CPT | Performed by: INTERNAL MEDICINE

## 2022-01-19 NOTE — PROGRESS NOTES
[facility] [facility] - Outpatient Consultation  Daljit Olmstead 79 y o  male MRN: [id number]  Encounter: [id number]      PCP: Fouzia Street MD  Referring: Charmayne Honey, MD  [address] [facility]  [address] [place] [address]      ASSESSMENT AND PLAN:    79 yr old M who presents to gastroenterology clinic for evaluation of abnormal CT imaging  1  Abnormal CT scan, small bowel     Patient had a CT abdomen/pelvis with contrast for evaluation of his right inguinal pain  This imaging had incidental findings of multiple small low-density lesions seen within the small bowel  Patient denies any abdominal discomfort, nausea/vomiting or constipation/diarrhea  He denies any weight loss  Not had any previous CT abdomen for comparison  Does not recall ingesting any thing out of the ordinary before the scan  - We will get a repeat abdominal imaging to evaluate for resolution or continued presence of the lesions  If the lesions are present again we will get a capsule endoscopy  - Will reach out to patient's nephrologist to ensure it is okay for the patient to get IV contrast given his kidney function  If any contraindications can plan on doing abdominal imaging with only p o  contrast  - Last colonoscopy was in 2016 which showed diverticulosis, no polyps seen  Given patient's age repeat colonoscopy deferred    Patient seen and discussed with Dr Samara Ta MD   Gastroenterology Fellow     ______________________________________________________________________    CC:  Chief Complaint   Patient presents with    New Patient Visit     ABNORMAL CT scan       HPI:  79 yr old M who presents to gastroenterology clinic for evaluation of abnormal CT imaging  Patient had a CT A/P w/ contrast which "showed scattered small low density lesions within the small bowel which are nonobstructing    The largest identified in the central small bowel loop measuring 1 2 x 0 7 cm, a similar attenuating but smaller eccentrically located low-density lesion is noted in the right mid abdominal small bowel loop and a 3rd smaller lesion is identified on  within a centrally located small bowel loop "    Patient denies any abdominal pain  No nausea or vomiting  Denies constipation or diarrhea  No known malignancy, no weight loss  Has not had previous abdominal imaging for comparison  REVIEW OF SYSTEMS:    CONSTITUTIONAL: Denies any fever, chills, rigors, and weight loss  HEENT: No earache or tinnitus  Denies hearing loss or visual disturbances  CARDIOVASCULAR: No chest pain or palpitations  RESPIRATORY: Denies any cough, hemoptysis, shortness of breath or dyspnea on exertion  GASTROINTESTINAL: As noted in the History of Present Illness  GENITOURINARY: No problems with urination  Denies any hematuria or dysuria  NEUROLOGIC: No dizziness or vertigo, denies headaches  MUSCULOSKELETAL: Denies any muscle or joint pain  SKIN: Denies skin rashes or itching  ENDOCRINE: Denies excessive thirst  Denies intolerance to heat or cold  PSYCHOSOCIAL: Denies depression or anxiety  Denies any recent memory loss  Historical Information   Past Medical History:   Diagnosis Date    Basal cell carcinoma     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     Coronary artery disease     CAD s/p stent 1995    Elevated PSA     R   11/16/17    Hypertension     Wears glasses      Past Surgical History:   Procedure Laterality Date    COLONOSCOPY      INGUINAL HERNIA REPAIR      MOHS SURGERY      SKIN BIOPSY      basal cell     TONSILLECTOMY       Social History   Social History     Substance and Sexual Activity   Alcohol Use No     Social History     Substance and Sexual Activity   Drug Use No     Social History     Tobacco Use   Smoking Status Never Smoker   Smokeless Tobacco Never Used     Family History   Problem Relation Age of Onset    COPD Mother     Heart failure Mother  Heart attack Father     Lung cancer Brother     No Known Problems Son     No Known Problems Daughter     No Known Problems Daughter        Meds/Allergies       Current Outpatient Medications:     aspirin (ECOTRIN LOW STRENGTH) 81 mg EC tablet    b complex vitamins tablet    Biotin w/ Vitamins C & E (HAIR/SKIN/NAILS) 1250-7 5-7 5 MCG-MG-UNT CHEW    Coenzyme Q10 (CO Q10) 100 MG CAPS    donepezil (ARICEPT) 10 mg tablet    finasteride (PROSCAR) 5 mg tablet    losartan (COZAAR) 50 mg tablet    Multiple Vitamins-Minerals (MULTI-VITAMIN/MINERALS PO)    rosuvastatin (CRESTOR) 5 mg tablet    No Known Allergies        Objective     Blood pressure 134/80, pulse 56, temperature 99 7 °F (37 6 °C), temperature source Tympanic, height 5' 7" (1 702 m), weight 63 5 kg (140 lb), SpO2 99 %  Body mass index is 21 93 kg/m²  PHYSICAL EXAM:      General Appearance:   Alert, cooperative, no distress   HEENT:   Normocephalic, atraumatic, anicteric  Neck:  Supple, symmetrical, trachea midline   Lungs:   Clear to auscultation bilaterally; no rales, rhonchi or wheezing; respirations unlabored    Heart[de-identified]   Regular rate and rhythm; no murmur, rub, or gallop     Abdomen:   Soft, non-tender, non-distended; normal bowel sounds; no masses, no organomegaly    Genitalia:   Deferred    Rectal:   Deferred    Extremities:  No cyanosis, clubbing or edema    Pulses:  2+ and symmetric    Skin:  No jaundice, rashes, or lesions    Lymph nodes:  No palpable cervical lymphadenopathy        Lab Results:     Lab Results   Component Value Date    WBC 6 23 12/10/2021    HGB 15 1 12/10/2021    HCT 45 9 12/10/2021    MCV 98 12/10/2021     12/10/2021       Lab Results   Component Value Date     09/12/2017    K 4 8 01/03/2022     (H) 01/03/2022    CO2 30 01/03/2022    BUN 24 01/03/2022    CREATININE 1 46 (H) 01/03/2022    GLUF 98 12/10/2021    CALCIUM 9 4 01/03/2022    AST 24 12/10/2021    ALT 27 12/10/2021    ALKPHOS 78 12/10/2021    PROT 6 5 05/15/2017    BILITOT 0 6 05/15/2017    EGFR 44 01/03/2022       No results found for: INR, PROTIME      Radiology Results:   No results found  Portions of the record may have been created with voice recognition software  Occasional wrong word or "sound a like" substitutions may have occurred due to the inherent limitations of voice recognition software  Read the chart carefully and recognize, using context, where substitutions have occurred

## 2022-01-27 ENCOUNTER — OFFICE VISIT (OUTPATIENT)
Dept: UROLOGY | Facility: AMBULATORY SURGERY CENTER | Age: 82
End: 2022-01-27
Payer: COMMERCIAL

## 2022-01-27 ENCOUNTER — RA CDI HCC (OUTPATIENT)
Dept: OTHER | Facility: HOSPITAL | Age: 82
End: 2022-01-27

## 2022-01-27 VITALS
HEIGHT: 67 IN | BODY MASS INDEX: 21.82 KG/M2 | HEART RATE: 60 BPM | DIASTOLIC BLOOD PRESSURE: 80 MMHG | SYSTOLIC BLOOD PRESSURE: 130 MMHG | WEIGHT: 139 LBS

## 2022-01-27 DIAGNOSIS — N13.9 BENIGN LOCALIZED HYPERPLASIA OF PROSTATE WITH URINARY OBSTRUCTION AND LOWER URINARY TRACT SYMPTOMS: ICD-10-CM

## 2022-01-27 DIAGNOSIS — N40.1 BENIGN PROSTATIC HYPERPLASIA WITH LOWER URINARY TRACT SYMPTOMS, SYMPTOM DETAILS UNSPECIFIED: Primary | ICD-10-CM

## 2022-01-27 DIAGNOSIS — N40.1 BENIGN LOCALIZED HYPERPLASIA OF PROSTATE WITH URINARY OBSTRUCTION AND LOWER URINARY TRACT SYMPTOMS: ICD-10-CM

## 2022-01-27 LAB — POST-VOID RESIDUAL VOLUME, ML POC: 0 ML

## 2022-01-27 PROCEDURE — 3079F DIAST BP 80-89 MM HG: CPT | Performed by: UROLOGY

## 2022-01-27 PROCEDURE — 1160F RVW MEDS BY RX/DR IN RCRD: CPT | Performed by: UROLOGY

## 2022-01-27 PROCEDURE — 99213 OFFICE O/P EST LOW 20 MIN: CPT | Performed by: UROLOGY

## 2022-01-27 PROCEDURE — 1036F TOBACCO NON-USER: CPT | Performed by: UROLOGY

## 2022-01-27 PROCEDURE — 51798 US URINE CAPACITY MEASURE: CPT | Performed by: UROLOGY

## 2022-01-27 PROCEDURE — 3075F SYST BP GE 130 - 139MM HG: CPT | Performed by: UROLOGY

## 2022-01-27 RX ORDER — FINASTERIDE 5 MG/1
5 TABLET, FILM COATED ORAL DAILY
Qty: 90 TABLET | Refills: 3 | Status: SHIPPED | OUTPATIENT
Start: 2022-01-27

## 2022-01-27 NOTE — PROGRESS NOTES
1/27/2022    Lobo Alvarenga  1940  [id number]        Assessment  BPH      Discussion  The patient is quite comfortable with his voiding pattern at this time on finasteride  I recommend that he continue the finasteride  We discussed his last PSA level in 2018 of 2 1  Per AUA guidelines there is no indication to continue with PSA testing and the patient is amenable with this plan  We discussed follow-up on an as-needed basis, however, the patient feels most comfortable returning in 1 year  History of Present Illness  79 y o  male with a history of mild BPH  He continues on finasteride  He has minimal lower urinary tract symptoms  He denies any nocturia  Returns for routine follow-up today  There is no reported family history of prostate cancer  His last PSA level in 2018 was noted to be 2 1  AUA Symptom Score  AUA SYMPTOM SCORE      Most Recent Value   AUA SYMPTOM SCORE    How often have you had a sensation of not emptying your bladder completely after you finished urinating? 0 (P)     How often have you had to urinate again less than two hours after you finished urinating? 0 (P)     How often have you found you stopped and started again several times when you urinate? 0 (P)     How often have you found it difficult to postpone urination? 0 (P)     How often have you had a weak urinary stream? 0 (P)     How often have you had to push or strain to begin urination? 0 (P)     How many times did you most typically get up to urinate from the time you went to bed at night until the time you got up in the morning? 1 (P)     Quality of Life: If you were to spend the rest of your life with your urinary condition just the way it is now, how would you feel about that? 2 (P)     AUA SYMPTOM SCORE 1 (P)           Review of Systems  Review of Systems   Constitutional: Negative  HENT: Negative  Eyes: Negative  Respiratory: Negative  Cardiovascular: Negative  Gastrointestinal: Negative  Endocrine: Negative  Genitourinary:        Per HPI   Musculoskeletal: Negative  Skin: Negative  Allergic/Immunologic: Negative  Neurological: Negative  Hematological: Negative  Psychiatric/Behavioral: Negative  Past Medical History  Past Medical History:   Diagnosis Date    Basal cell carcinoma     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     Coronary artery disease     CAD s/p stent 1995    Elevated PSA     R   11/16/17    Hypertension     Wears glasses        Past Social History  Past Surgical History:   Procedure Laterality Date    COLONOSCOPY      INGUINAL HERNIA REPAIR      MOHS SURGERY      SKIN BIOPSY      basal cell     TONSILLECTOMY         Past Family History  Family History   Problem Relation Age of Onset    COPD Mother     Heart failure Mother     Heart attack Father     Lung cancer Brother     No Known Problems Son     No Known Problems Daughter     No Known Problems Daughter        Past Social history  Social History     Socioeconomic History    Marital status: /Civil Union     Spouse name: Earline Stringer Number of children: 3    Years of education: Not on file    Highest education level: Not on file   Occupational History    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      Comment: retired    Tobacco Use    Smoking status: Never Smoker    Smokeless tobacco: Never Used   Vaping Use    Vaping Use: Never used   Substance and Sexual Activity    Alcohol use: No    Drug use: No    Sexual activity: Yes     Partners: Female   Other Topics Concern    Not on file   Social History Narrative    Who lives in your home: wife     What type of home do you live in: Other jail home [place]     Age of your home: 2 5 yrs the portion where they live     How long have you been living there: 2017    Type of heat: Forced hot air    Type of fuel: Electric    What type of sadia is in your bedroom: Area rugs and [employer] floor    Do you have the following in or near your home:    Air products: Central air    Pests: None    Pets: None    Are pets allowed in bedroom: N/A    Open fields, wooded areas nearby: Open fields    Basement: None    Exposure to second hand smoke: No        Habits:    Caffeine: soda occasionally-hot tea 1-2 cups daily     Chocolate: rarely     Other:     Social Determinants of Health     Financial Resource Strain: Not on file   Food Insecurity: Not on file   Transportation Needs: Not on file   Physical Activity: Not on file   Stress: Not on file   Social Connections: Not on file   Intimate Partner Violence: Not on file   Housing Stability: Not on file       Current Medications  Current Outpatient Medications   Medication Sig Dispense Refill    aspirin (ECOTRIN LOW STRENGTH) 81 mg EC tablet Take 81 mg by mouth      b complex vitamins tablet Take 1 tablet by mouth daily      Biotin w/ Vitamins C & E (HAIR/SKIN/NAILS) 1250-7 5-7 5 MCG-MG-UNT CHEW Chew      Coenzyme Q10 (CO Q10) 100 MG CAPS Take by mouth      donepezil (ARICEPT) 10 mg tablet TAKE 1 TABLET BY MOUTH DAILY AT BEDTIME 90 tablet 1    finasteride (PROSCAR) 5 mg tablet TAKE 1 TABLET BY MOUTH EVERY DAY 90 tablet 3    losartan (COZAAR) 50 mg tablet TAKE 1 TABLET BY MOUTH EVERY DAY 90 tablet 1    Multiple Vitamins-Minerals (MULTI-VITAMIN/MINERALS PO) Take by mouth      rosuvastatin (CRESTOR) 5 mg tablet 10 mg         No current facility-administered medications for this visit  Allergies  No Known Allergies    Past Medical History, Social History, Family History, medications and allergies were reviewed  Vitals  Vitals:    01/27/22 1057   BP: 130/80   Pulse: 60   Weight: 63 kg (139 lb)   Height: 5' 7" (1 702 m)       Physical Exam  Physical Exam    On examination he is in no acute distress  His abdomen is soft nontender nondistended   examination reveals no CVA tenderness    Gait normal   Affect normal       Results  Lab Results   Component Value Date PSA 2 1 11/13/2018     Lab Results   Component Value Date    CALCIUM 9 4 01/03/2022     09/12/2017    K 4 8 01/03/2022    CO2 30 01/03/2022     (H) 01/03/2022    BUN 24 01/03/2022    CREATININE 1 46 (H) 01/03/2022     Lab Results   Component Value Date    WBC 6 23 12/10/2021    HGB 15 1 12/10/2021    HCT 45 9 12/10/2021    MCV 98 12/10/2021     12/10/2021         Office Urine Dip  Recent Results (from the past 1 hour(s))   POCT Measure PVR    Collection Time: 01/27/22 11:02 AM   Result Value Ref Range    POST-VOID RESIDUAL VOLUME, ML POC 0 mL   ]

## 2022-01-27 NOTE — PROGRESS NOTES
[facility]  coding opportunities       Chart reviewed, no opportunity found: CHART REVIEWED, NO OPPORTUNITY FOUND                        Patients insurance company: [employer] (Medicare Advantage and Commercial)

## 2022-01-31 ENCOUNTER — HOSPITAL ENCOUNTER (OUTPATIENT)
Dept: RADIOLOGY | Age: 82
Discharge: HOME | End: 2022-01-31
Payer: COMMERCIAL

## 2022-01-31 DIAGNOSIS — R93.3 ABNORMAL CT SCAN, SMALL BOWEL: ICD-10-CM

## 2022-01-31 PROCEDURE — G1004 CDSM NDSC: HCPCS

## 2022-01-31 PROCEDURE — 74177 CT ABD & PELVIS W/CONTRAST: CPT

## 2022-01-31 RX ADMIN — IOHEXOL 100 ML: 350 INJECTION, SOLUTION INTRAVENOUS at 13:57

## 2022-02-07 ENCOUNTER — PREP FOR PROCEDURE (OUTPATIENT)
Dept: GASTROENTEROLOGY | Facility: CLINIC | Age: 82
End: 2022-02-07

## 2022-02-07 DIAGNOSIS — R93.5 ABNORMAL ABDOMINAL CT SCAN: Primary | ICD-10-CM

## 2022-02-08 ENCOUNTER — TELEPHONE (OUTPATIENT)
Dept: GASTROENTEROLOGY | Facility: CLINIC | Age: 82
End: 2022-02-08

## 2022-02-08 ENCOUNTER — OFFICE VISIT (OUTPATIENT)
Dept: FAMILY MEDICINE CLINIC | Facility: CLINIC | Age: 82
End: 2022-02-08
Payer: COMMERCIAL

## 2022-02-08 VITALS
DIASTOLIC BLOOD PRESSURE: 98 MMHG | HEIGHT: 67 IN | TEMPERATURE: 97.5 F | SYSTOLIC BLOOD PRESSURE: 142 MMHG | BODY MASS INDEX: 21.82 KG/M2 | OXYGEN SATURATION: 98 % | HEART RATE: 54 BPM | WEIGHT: 139 LBS

## 2022-02-08 DIAGNOSIS — K63.9 SMALL BOWEL LESION: ICD-10-CM

## 2022-02-08 DIAGNOSIS — E78.5 HYPERLIPIDEMIA, UNSPECIFIED HYPERLIPIDEMIA TYPE: ICD-10-CM

## 2022-02-08 DIAGNOSIS — I10 HYPERTENSION, UNSPECIFIED TYPE: Primary | ICD-10-CM

## 2022-02-08 PROCEDURE — 3080F DIAST BP >= 90 MM HG: CPT | Performed by: INTERNAL MEDICINE

## 2022-02-08 PROCEDURE — 1036F TOBACCO NON-USER: CPT | Performed by: INTERNAL MEDICINE

## 2022-02-08 PROCEDURE — 99214 OFFICE O/P EST MOD 30 MIN: CPT | Performed by: INTERNAL MEDICINE

## 2022-02-08 PROCEDURE — 3077F SYST BP >= 140 MM HG: CPT | Performed by: INTERNAL MEDICINE

## 2022-02-08 PROCEDURE — 1160F RVW MEDS BY RX/DR IN RCRD: CPT | Performed by: INTERNAL MEDICINE

## 2022-02-08 NOTE — TELEPHONE ENCOUNTER
Patients GI provider:  Dr Sheron Harris    Number to return call: [phone]    Reason for call: Pt's wife Wiley called stating pt is sched for capsule endoscopy on 02/28/22 and was told to call GI to make sure it is covered by insurance   Wiley would like to be called at the above number to let her know     Scheduled procedure/appointment date if applicable: NA

## 2022-02-08 NOTE — PROGRESS NOTES
Assessment/Plan:           Problem List Items Addressed This Visit        Cardiovascular and Mediastinum    Hypertension - Primary       Other    Hyperlipidemia      Other Visit Diagnoses     Small bowel lesion            see discussion above    Subjective:      Patient ID: Marco Anaya is a 79 y o  male  HPI  Patient with history of hypertension hyperlipidemia is here to follow-up on chronic medical problems and update me about recent CT scan and small bowel lesions  Patient is scheduled for capsule endoscopy next Friday  Patient does not have any abdominal pain appetite changes nausea vomiting weight loss fever chills  No concerning family history  Patient did have COVID vaccination 3 months prior  Blood pressure is good today  Tolerating statins well  His heart rate is somewhat on the lower side  He does have pulse oximeter at home encouraged him to continue monitoring his blood pressure as well as pulse ox at home  If heart remains on the lower side he would contact me as well as his cardiologist       The following portions of the patient's history were reviewed and updated as appropriate: allergies, current medications, past family history, past medical history, past social history, past surgical history and problem list     Review of Systems      Objective:      /98 (BP Location: Left arm, Patient Position: Sitting, Cuff Size: Standard)   Pulse (!) 54   Temp 97 5 °F (36 4 °C) (Tympanic)   Ht 5' 7" (1 702 m)   Wt 63 kg (139 lb)   SpO2 98%   BMI 21 77 kg/m²          Physical Exam  Cardiovascular:      Rate and Rhythm: Normal rate and regular rhythm  Heart sounds: Normal heart sounds  No murmur heard  Pulmonary:      Effort: Pulmonary effort is normal  No respiratory distress  Breath sounds: Normal breath sounds  No wheezing or rales  Abdominal:      General: Bowel sounds are normal  There is no distension  Tenderness: There is no abdominal tenderness   There is no guarding  Musculoskeletal:      Right lower leg: No edema  Left lower leg: No edema  Neurological:      Mental Status: He is alert

## 2022-02-12 DIAGNOSIS — F09 MILD COGNITIVE DISORDER: ICD-10-CM

## 2022-02-14 ENCOUNTER — TELEPHONE (OUTPATIENT)
Dept: FAMILY MEDICINE CLINIC | Facility: CLINIC | Age: 82
End: 2022-02-14

## 2022-02-14 RX ORDER — DONEPEZIL HYDROCHLORIDE 10 MG/1
TABLET, FILM COATED ORAL
Qty: 90 TABLET | Refills: 1 | Status: SHIPPED | OUTPATIENT
Start: 2022-02-14

## 2022-02-14 NOTE — TELEPHONE ENCOUNTER
Patient's wife Tian Pedroza called to let Dr Williams Blas know what her  pulse ox readings are since here was here last week  The readings for 2/9: 60, 2/11: 58, 2/12: 56 and 2/14: 51 & 55  Please advise

## 2022-02-16 ENCOUNTER — TELEPHONE (OUTPATIENT)
Dept: FAMILY MEDICINE CLINIC | Facility: CLINIC | Age: 82
End: 2022-02-16

## 2022-02-16 NOTE — TELEPHONE ENCOUNTER
Patient states that heart rate has been ranging below 60 still and would like to know if cardiologist should be contacted

## 2022-02-28 ENCOUNTER — HOSPITAL ENCOUNTER (OUTPATIENT)
Dept: GASTROENTEROLOGY | Facility: HOSPITAL | Age: 82
Discharge: HOME | End: 2022-02-28
Payer: COMMERCIAL

## 2022-02-28 DIAGNOSIS — R93.5 ABNORMAL ABDOMINAL CT SCAN: ICD-10-CM

## 2022-02-28 PROCEDURE — 91110 GI TRC IMG INTRAL ESOPH-ILE: CPT

## 2022-03-09 ENCOUNTER — TELEPHONE (OUTPATIENT)
Dept: OTHER | Facility: OTHER | Age: 82
End: 2022-03-09

## 2022-03-13 PROCEDURE — 91110 GI TRC IMG INTRAL ESOPH-ILE: CPT | Performed by: INTERNAL MEDICINE

## 2022-03-21 ENCOUNTER — PREP FOR PROCEDURE (OUTPATIENT)
Dept: GASTROENTEROLOGY | Facility: CLINIC | Age: 82
End: 2022-03-21

## 2022-03-21 DIAGNOSIS — K63.9 SMALL BOWEL LESION: Primary | ICD-10-CM

## 2022-03-25 ENCOUNTER — TELEPHONE (OUTPATIENT)
Dept: GASTROENTEROLOGY | Facility: CLINIC | Age: 82
End: 2022-03-25

## 2022-03-25 NOTE — TELEPHONE ENCOUNTER
Patients GI provider:  Dr Kimi Bowman    Number to return call: [phone]    Reason for call: Pt's wife called to schedule pt's EGD       Scheduled procedure/appointment date if applicable: Apt/procedure NA

## 2022-03-25 NOTE — TELEPHONE ENCOUNTER
Procedure scheduled with Leann Chamber      Scheduled date of EGD single balloon(as of today): 5/17/22  Physician performing EGD single balloon: Dr Marc  Location of EGD single balloon: [facility]  Instructions reviewed with patient by: Angélica/milo  Clearances: N/A

## 2022-05-11 ENCOUNTER — OFFICE VISIT (OUTPATIENT)
Dept: FAMILY MEDICINE CLINIC | Facility: CLINIC | Age: 82
End: 2022-05-11
Payer: COMMERCIAL

## 2022-05-11 VITALS
BODY MASS INDEX: 22.57 KG/M2 | OXYGEN SATURATION: 98 % | SYSTOLIC BLOOD PRESSURE: 124 MMHG | TEMPERATURE: 98.1 F | HEIGHT: 67 IN | WEIGHT: 143.8 LBS | RESPIRATION RATE: 16 BRPM | HEART RATE: 57 BPM | DIASTOLIC BLOOD PRESSURE: 84 MMHG

## 2022-05-11 DIAGNOSIS — J30.0 VASOMOTOR RHINITIS: ICD-10-CM

## 2022-05-11 DIAGNOSIS — E78.5 HYPERLIPIDEMIA, UNSPECIFIED HYPERLIPIDEMIA TYPE: Primary | ICD-10-CM

## 2022-05-11 DIAGNOSIS — I10 HYPERTENSION, UNSPECIFIED TYPE: ICD-10-CM

## 2022-05-11 DIAGNOSIS — K63.9 SMALL BOWEL LESION: ICD-10-CM

## 2022-05-11 PROCEDURE — 3074F SYST BP LT 130 MM HG: CPT | Performed by: INTERNAL MEDICINE

## 2022-05-11 PROCEDURE — 3079F DIAST BP 80-89 MM HG: CPT | Performed by: INTERNAL MEDICINE

## 2022-05-11 PROCEDURE — 3725F SCREEN DEPRESSION PERFORMED: CPT | Performed by: INTERNAL MEDICINE

## 2022-05-11 PROCEDURE — 1160F RVW MEDS BY RX/DR IN RCRD: CPT | Performed by: INTERNAL MEDICINE

## 2022-05-11 PROCEDURE — 99214 OFFICE O/P EST MOD 30 MIN: CPT | Performed by: INTERNAL MEDICINE

## 2022-05-11 PROCEDURE — 1036F TOBACCO NON-USER: CPT | Performed by: INTERNAL MEDICINE

## 2022-05-11 NOTE — PROGRESS NOTES
Assessment/Plan:           Problem List Items Addressed This Visit        Respiratory    Vasomotor rhinitis       Cardiovascular and Mediastinum    Hypertension       Other    Hyperlipidemia - Primary    Relevant Orders    Lipid panel      Other Visit Diagnoses     Small bowel lesion            I will see patient back in 4-6 months  See discussion above  Subjective:      Patient ID: Clover Guerra is a 79 y o  male  HPI  Patient is scheduled for single balloon with push enteroscopy in a week to evaluate the lesions on capsule endoscopy  Patient is also being treated by ENT for laryngeal pharyngeal reflux  Patient is on Pepcid and Protonix  Patient is status post cryoablation for vasomotor rhinitis  Patient has continued runny nose  Patient is tolerating Aricept  Blood pressure is good  Patient is tolerating statin  Studies are due  Patient encouraged to get Prevnar next month after he has a booster of COVID but to wait 2 weeks after his upcoming procedure  Patient reports no abdominal pain reflux symptoms bowel changes otherwise  The following portions of the patient's history were reviewed and updated as appropriate: allergies, current medications, past medical history, past social history, past surgical history and problem list     Review of Systems      Objective:      /84 (BP Location: Left arm, Patient Position: Sitting, Cuff Size: Adult)   Pulse 57   Temp 98 1 °F (36 7 °C) (Temporal)   Resp 16   Ht 5' 7" (1 702 m)   Wt 65 2 kg (143 lb 12 8 oz)   SpO2 98%   BMI 22 52 kg/m²          Physical Exam  Cardiovascular:      Rate and Rhythm: Regular rhythm  Bradycardia present  Heart sounds: Normal heart sounds  No murmur heard  Pulmonary:      Effort: Pulmonary effort is normal  No respiratory distress  Breath sounds: Normal breath sounds  No wheezing or rales  Abdominal:      General: There is no distension  Tenderness: There is no abdominal tenderness   There is no guarding  Musculoskeletal:      Right lower leg: No edema  Left lower leg: No edema

## 2022-05-17 ENCOUNTER — ANESTHESIA (OUTPATIENT)
Dept: GASTROENTEROLOGY | Facility: HOSPITAL | Age: 82
End: 2022-05-17

## 2022-05-17 ENCOUNTER — HOSPITAL ENCOUNTER (OUTPATIENT)
Dept: GASTROENTEROLOGY | Facility: HOSPITAL | Age: 82
Setting detail: OUTPATIENT SURGERY
Discharge: HOME | End: 2022-05-17
Attending: INTERNAL MEDICINE
Payer: COMMERCIAL

## 2022-05-17 ENCOUNTER — ANESTHESIA EVENT (OUTPATIENT)
Dept: GASTROENTEROLOGY | Facility: HOSPITAL | Age: 82
End: 2022-05-17

## 2022-05-17 VITALS
HEART RATE: 56 BPM | HEIGHT: 67 IN | OXYGEN SATURATION: 98 % | SYSTOLIC BLOOD PRESSURE: 136 MMHG | WEIGHT: 143.3 LBS | DIASTOLIC BLOOD PRESSURE: 78 MMHG | BODY MASS INDEX: 22.49 KG/M2 | TEMPERATURE: 97 F | RESPIRATION RATE: 16 BRPM

## 2022-05-17 DIAGNOSIS — K63.9 SMALL BOWEL LESION: ICD-10-CM

## 2022-05-17 PROCEDURE — 88305 TISSUE EXAM BY PATHOLOGIST: CPT | Performed by: PATHOLOGY

## 2022-05-17 PROCEDURE — 44361 SMALL BOWEL ENDOSCOPY/BIOPSY: CPT | Performed by: INTERNAL MEDICINE

## 2022-05-17 RX ORDER — LIDOCAINE HYDROCHLORIDE 10 MG/ML
INJECTION, SOLUTION EPIDURAL; INFILTRATION; INTRACAUDAL; PERINEURAL AS NEEDED
Status: DISCONTINUED | OUTPATIENT
Start: 2022-05-17 | End: 2022-05-17

## 2022-05-17 RX ORDER — SODIUM CHLORIDE 9 MG/ML
INJECTION, SOLUTION INTRAVENOUS CONTINUOUS PRN
Status: DISCONTINUED | OUTPATIENT
Start: 2022-05-17 | End: 2022-05-17

## 2022-05-17 RX ORDER — PROPOFOL 10 MG/ML
INJECTION, EMULSION INTRAVENOUS CONTINUOUS PRN
Status: DISCONTINUED | OUTPATIENT
Start: 2022-05-17 | End: 2022-05-17

## 2022-05-17 RX ORDER — PROPOFOL 10 MG/ML
INJECTION, EMULSION INTRAVENOUS AS NEEDED
Status: DISCONTINUED | OUTPATIENT
Start: 2022-05-17 | End: 2022-05-17

## 2022-05-17 RX ADMIN — PROPOFOL 80 MG: 10 INJECTION, EMULSION INTRAVENOUS at 14:24

## 2022-05-17 RX ADMIN — PROPOFOL 110 MCG/KG/MIN: 10 INJECTION, EMULSION INTRAVENOUS at 14:25

## 2022-05-17 RX ADMIN — LIDOCAINE HYDROCHLORIDE 50 MG: 10 INJECTION, SOLUTION EPIDURAL; INFILTRATION; INTRACAUDAL at 14:24

## 2022-05-17 RX ADMIN — SODIUM CHLORIDE: 0.9 INJECTION, SOLUTION INTRAVENOUS at 14:01

## 2022-05-17 NOTE — ANESTHESIA POSTPROCEDURE EVALUATION
Post-Op Assessment Note    CV Status:  Stable    Pain management: adequate     Mental Status:  Alert and awake   Hydration Status:  Euvolemic   PONV Controlled:  Controlled   Airway Patency:  Patent      Post Op Vitals Reviewed: Yes      Staff: CRNA         No complications documented      BP   135/72   Temp   97 6   Pulse  70   Resp   18   SpO2   99

## 2022-05-17 NOTE — ANESTHESIA PREPROCEDURE EVALUATION
Procedure:  EGD WITH SINGLE BALLOON ENTEROSCOPY    Relevant Problems   ANESTHESIA (within normal limits)      CARDIO   (+) Atherosclerosis of coronary artery of native heart (s/p PCI)   (+) Essential hypertension   (+) Hyperlipidemia   (+) Raynaud's syndrome without gangrene      /RENAL   (+) Benign hypertension with chronic kidney disease, stage III (HCC)   (+) 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   (+) Bilateral renal cysts   (+) Stage 3a chronic kidney disease (HCC)      NEURO/PSYCH   (+) History of malignant melanoma of skin      Digestive   (+) Lesion of small intestine        Physical Exam    Airway    Mallampati score: II  TM Distance: >3 FB  Neck ROM: full     Dental   No notable dental hx     Cardiovascular  Rhythm: regular, Rate: normal, Cardiovascular exam normal    Pulmonary  Pulmonary exam normal Breath sounds clear to auscultation,     Other Findings        Anesthesia Plan  ASA Score- 3     Anesthesia Type- IV sedation with anesthesia with ASA Monitors  Additional Monitors:   Airway Plan:           Plan Factors-Exercise tolerance (METS): >4 METS  Chart reviewed  EKG reviewed  Existing labs reviewed  Patient summary reviewed  Patient is not a current smoker  Induction-     Postoperative Plan-     Informed Consent- Anesthetic plan and risks discussed with patient  I personally reviewed this patient with the CRNA  Discussed and agreed on the Anesthesia Plan with the CRNA  Joseph Sol

## 2022-05-17 NOTE — H&P
History and Physical -  [facility]  Jennyfer Jade 79 y o  male MRN: [id number]    HPI: Jennyfer Jade is a 79y o  year old male who presents for EGD with single balloon  Patient previously had abnormal CT scan  Was noted to have multiple small low-density lesions within the small bowel  Capsule endoscopy showed patient had evidence of white plaques in the small intestine  REVIEW OF SYSTEMS: Per the HPI, and otherwise unremarkable  Historical Information   Past Medical History:   Diagnosis Date    Basal cell carcinoma     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     Coronary artery disease     CAD s/p stent 1995    Elevated PSA     R   11/16/17    Hypertension     Wears glasses      Past Surgical History:   Procedure Laterality Date    COLONOSCOPY      INGUINAL HERNIA REPAIR      MOHS SURGERY      SKIN BIOPSY      basal cell     TONSILLECTOMY       Social History   Social History     Substance and Sexual Activity   Alcohol Use No     Social History     Substance and Sexual Activity   Drug Use No     Social History     Tobacco Use   Smoking Status Never Smoker   Smokeless Tobacco Never Used     Family History   Problem Relation Age of Onset    COPD Mother     Heart failure Mother     Heart attack Father     Lung cancer Brother     No Known Problems Son     No Known Problems Daughter     No Known Problems Daughter        Meds/Allergies     (Not in a hospital admission)      No Known Allergies    Objective     There were no vitals taken for this visit  PHYSICAL EXAM    Gen: NAD  CV: RRR  CHEST: Clear  ABD: soft, NT/ND  EXT: no edema      ASSESSMENT/PLAN:  This is a 79y o  year old male here for EGD with single balloon, and he is stable and optimized for his procedure

## 2022-05-24 DIAGNOSIS — I12.9 BENIGN HYPERTENSION WITH CHRONIC KIDNEY DISEASE, STAGE III (HCC): ICD-10-CM

## 2022-05-24 DIAGNOSIS — N18.30 BENIGN HYPERTENSION WITH CHRONIC KIDNEY DISEASE, STAGE III (HCC): ICD-10-CM

## 2022-05-24 RX ORDER — LOSARTAN POTASSIUM 50 MG/1
TABLET ORAL
Qty: 90 TABLET | Refills: 1 | Status: SHIPPED | OUTPATIENT
Start: 2022-05-24

## 2022-06-20 ENCOUNTER — TELEPHONE (OUTPATIENT)
Dept: NEPHROLOGY | Facility: CLINIC | Age: 82
End: 2022-06-20

## 2022-06-20 ENCOUNTER — APPOINTMENT (OUTPATIENT)
Dept: LAB | Age: 82
End: 2022-06-20
Payer: COMMERCIAL

## 2022-06-20 LAB
CHOLEST SERPL-MCNC: 141 MG/DL
HDLC SERPL-MCNC: 34 MG/DL
LDLC SERPL CALC-MCNC: 81 MG/DL (ref 0–100)
NONHDLC SERPL-MCNC: 107 MG/DL
TRIGL SERPL-MCNC: 128 MG/DL

## 2022-06-20 PROCEDURE — 80061 LIPID PANEL: CPT | Performed by: INTERNAL MEDICINE

## 2022-06-20 NOTE — TELEPHONE ENCOUNTER
Appointment Confirmation   Person confirmed appointment with  If not patient, name of the person Spouse  Bard Acosta   Date and time of appointment 6/21   9:00   Patient acknowledged and will be at appointment? yes    Did you advise the patient that they will need a urine sample if they are a new patient?  N/A    Did you advise the patient to bring their current medications for verification? (including any OTC) Yes    Additional Information

## 2022-06-21 ENCOUNTER — OFFICE VISIT (OUTPATIENT)
Dept: NEPHROLOGY | Facility: CLINIC | Age: 82
End: 2022-06-21
Payer: COMMERCIAL

## 2022-06-21 VITALS
SYSTOLIC BLOOD PRESSURE: 130 MMHG | HEART RATE: 58 BPM | HEIGHT: 67 IN | OXYGEN SATURATION: 97 % | BODY MASS INDEX: 22.13 KG/M2 | DIASTOLIC BLOOD PRESSURE: 72 MMHG | WEIGHT: 141 LBS

## 2022-06-21 DIAGNOSIS — R80.1 PERSISTENT PROTEINURIA: ICD-10-CM

## 2022-06-21 DIAGNOSIS — N40.1 BPH WITH OBSTRUCTION/LOWER URINARY TRACT SYMPTOMS: ICD-10-CM

## 2022-06-21 DIAGNOSIS — N18.30 BENIGN HYPERTENSION WITH CHRONIC KIDNEY DISEASE, STAGE III (HCC): ICD-10-CM

## 2022-06-21 DIAGNOSIS — N18.32 STAGE 3B CHRONIC KIDNEY DISEASE (HCC): Primary | ICD-10-CM

## 2022-06-21 DIAGNOSIS — N28.1 BILATERAL RENAL CYSTS: ICD-10-CM

## 2022-06-21 DIAGNOSIS — I12.9 BENIGN HYPERTENSION WITH CHRONIC KIDNEY DISEASE, STAGE III (HCC): ICD-10-CM

## 2022-06-21 DIAGNOSIS — N13.8 BPH WITH OBSTRUCTION/LOWER URINARY TRACT SYMPTOMS: ICD-10-CM

## 2022-06-21 PROCEDURE — 3075F SYST BP GE 130 - 139MM HG: CPT | Performed by: PHYSICIAN ASSISTANT

## 2022-06-21 PROCEDURE — 1036F TOBACCO NON-USER: CPT | Performed by: PHYSICIAN ASSISTANT

## 2022-06-21 PROCEDURE — 1160F RVW MEDS BY RX/DR IN RCRD: CPT | Performed by: PHYSICIAN ASSISTANT

## 2022-06-21 PROCEDURE — 3078F DIAST BP <80 MM HG: CPT | Performed by: PHYSICIAN ASSISTANT

## 2022-06-21 PROCEDURE — 99214 OFFICE O/P EST MOD 30 MIN: CPT | Performed by: PHYSICIAN ASSISTANT

## 2022-06-21 NOTE — PATIENT INSTRUCTIONS
Overall doing very well    Follow up in 6 months with repeat labs prior   Call in the meantime with any issues

## 2022-06-21 NOTE — PROGRESS NOTES
OFFICE FOLLOW UP - Nephrology   Cash Howard 79 y o  male MRN: [id number]       ASSESSMENT/PLAN:  1  CKD stage IIIB: Baseline creatinine 1 4-1 5 due to hypertensive nephrosclerosis and age-related nephron loss  Recent creatinine 1 46 with GFR 44 which is stable  · Discussed the importance of good blood pressure control and continue to avoid NSAIDs to help prevent progression of CKD  · Prior renal ultrasound negative for hydronephrosis   · Prior SPEP and UPEP negative for monoclonal gammopathy  2  Proteinuria:  Urine protein creatinine ratio 0 22 g which is stable  Continue losartan 50 mg daily  3  Bilateral renal cysts:  Last renal imaging was contrast CT December 2021 showing bilateral simple cysts, largest 6 cm and the left and 3 cm in the right  No need for further monitoring  4  Hypertension:  Blood pressure well controlled in our office  Continue losartan 50 mg daily  5  BPH:  Saw Urology in January and PVR was 0cc    Follow-up in 6 months with repeat labs prior with Dr Meliton Fountain:  Patient Instructions    Overall doing very well     Follow up in 6 months with repeat labs prior    Call in the meantime with any issues      HPI: Cash Howard is a 79 y o  male who is here for CKD follow-up  Patient has been doing very well since his last visit  Denies any recent chest pain, shortness of breath, nausea, vomiting or diarrhea  Eating and drinking well according to his wife  No urinary issues  Does not use any NSAIDs  ROS:   A complete review of systems was done  Pertinent positives and negatives as noted in the HPI, otherwise the review of systems is negative  Allergies: Patient has no known allergies      Medications:   Current Outpatient Medications:     aspirin (ECOTRIN LOW STRENGTH) 81 mg EC tablet, Take 81 mg by mouth, Disp: , Rfl:     b complex vitamins tablet, Take 1 tablet by mouth daily, Disp: , Rfl:     Biotin w/ Vitamins C & E 1250-7 5-7 5 MCG-MG-UNT CHEW, Chew, Disp: , Rfl:     Coenzyme Q10 (CO Q10) 100 MG CAPS, Take by mouth, Disp: , Rfl:     donepezil (ARICEPT) 10 mg tablet, TAKE 1 TABLET BY MOUTH DAILY AT BEDTIME, Disp: 90 tablet, Rfl: 1    finasteride (PROSCAR) 5 mg tablet, Take 1 tablet (5 mg total) by mouth daily, Disp: 90 tablet, Rfl: 3    losartan (COZAAR) 50 mg tablet, TAKE 1 TABLET BY MOUTH EVERY DAY, Disp: 90 tablet, Rfl: 1    Multiple Vitamins-Minerals (MULTI-VITAMIN/MINERALS PO), Take by mouth, Disp: , Rfl:     pantoprazole (PROTONIX) 40 mg tablet, Take 1 tablet (40 mg total) by mouth 2 (two) times a day 30 min before breakfast and before dinner, Disp: 60 tablet, Rfl: 3    rosuvastatin (CRESTOR) 5 mg tablet, 10 mg  , Disp: , Rfl:     ipratropium (ATROVENT) 0 06 % nasal spray, 2 sprays into each nostril 3 (three) times a day as needed for rhinitis (Patient not taking: Reported on 6/21/2022), Disp: 15 mL, Rfl: 3    Past Medical History:   Diagnosis Date    Basal cell carcinoma     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     Coronary artery disease     CAD s/p stent 1995    Elevated PSA     R   11/16/17    Hypertension     Wears glasses      Past Surgical History:   Procedure Laterality Date    COLONOSCOPY      INGUINAL HERNIA REPAIR      MOHS SURGERY      SKIN BIOPSY      basal cell     TONSILLECTOMY       Family History   Problem Relation Age of Onset    COPD Mother     Heart failure Mother     Heart attack Father     Lung cancer Brother     No Known Problems Son     No Known Problems Daughter     No Known Problems Daughter       reports that he has never smoked  He has never used smokeless tobacco  He reports that he does not drink alcohol and does not use drugs        Physical Exam:   Vitals:    06/21/22 0857 06/21/22 0908   BP: 148/80 130/72   BP Location: Left arm    Patient Position: Sitting    Cuff Size: Standard    Pulse: 58    SpO2: 97%    Weight: 64 kg (141 lb)    Height: 5' 7" (1 702 m)      Body mass index is 22 08 kg/m²  General: no acute distress   Eyes: conjunctivae pink, anicteric sclerae  ENT: mucous membranes moist  Neck: supple, no JVD  Chest: clear to auscultation bilaterally with no wheezes, rale or rhochi  CVS: regular rate and rhythm   Abdomen: soft, non-tender, non-distended  Extremities: no lower extremity edema   Skin: no rash  Neuro: awake and alert       Lab Results:  Results for orders placed or performed during the hospital encounter of 05/17/22   Tissue Exam   Result Value Ref Range    Case Report       Surgical Pathology Report                         Case: [id number]                                   Authorizing Provider:  Sandeep Jackson MD          Collected:           05/17/2022 1428              Ordering Location:     [address] [facility]      Received:            05/17/2022 [address] [place] [address] Endoscopy                                                           Pathologist:           Rosemary Jason MD                                                       Specimens:   A) - Duodenum                                                                                       B) - Duodenum, duodenal nodule bx                                                          Final Diagnosis       A  Duodenum,   biopsy:    -Benign small intestinal mucosa with retained villous architecture and no evidence of increased intraepithelial lymphocytes    -No evidence of dysplasia or malignancy  B  Duodenum, duodenal nodule biopsy:    -Benign small intestinal mucosa with retained villous architecture and no evidence of increased intraepithelial lymphocytes  -     -Mild non-specific chronic inflammation of lamina propria seen   -No evidence of dysplasia or malignancy               Note       Interpretation performed at [address] [address], [address] [facility], [address] [facility] [address] [address]        Additional Information       All reported additional testing was performed with appropriately reactive controls  These tests were developed and their performance characteristics determined by [facility] or appropriate performing facility, though some tests may be performed on tissues which have not been validated for performance characteristics (such as staining performed on alcohol exposed cell blocks and decalcified tissues)  Results should be interpreted with caution and in the context of the patients clinical condition  These tests may not be cleared or approved by the [place]  Food and Drug Administration, though the FDA has determined that such clearance or approval is not necessary  These tests are used for clinical purposes and they should not be regarded as investigational or for research  This laboratory has been approved by [place], designated as a high-complexity laboratory and is qualified to perform these tests  Flores Browning Description          A  The specimen is received in formalin, labeled with the patient's name and hospital number, and is designated "duodenum biopsy  The specimen consists of 5 fragments of tan soft tissue ranging in greatest dimension from 2 mm to 4 mm  Entirely submitted  Screened cassette  B  The specimen is received in formalin, labeled with the patient's name and hospital number, and is designated "duodenum biopsy, duodenal nodule biopsy  The specimen consists of 9 fragments of tan soft tissue ranging in greatest dimension from 1 mm to 6 mm  Entirely submitted  Screened cassette  Note: The estimated total formalin fixation time based upon information provided by the submitting clinician and the standard processing schedule is under 72 hours      SBazos              Results from last 7 days   Lab Units 06/20/22  1144   WBC Thousand/uL 7 92   HEMOGLOBIN g/dL 14 5   HEMATOCRIT % 42 7   PLATELETS Thousands/uL 180   SODIUM mmol/L 143   POTASSIUM mmol/L 4 5   CHLORIDE mmol/L 111*   CO2 mmol/L 30 BUN mg/dL 16   CREATININE mg/dL 1 46*   CALCIUM mg/dL 9 2   MAGNESIUM mg/dL 2 3   PHOSPHORUS mg/dL 3 3         Portions of the record may have been created with voice recognition software  Occasional wrong word or "sound a like" substitutions may have occurred due to the inherent limitations of voice recognition software  Read the chart carefully and recognize, using context, where substitutions have occurred  If you have any questions, please contact the dictating provider

## 2022-06-22 NOTE — RESULT ENCOUNTER NOTE
Good morning Yung,  There are no concerning findings on your blood work at this time  Your cholesterol has improved even more  Your kidney function is stable  I am currently covering for Dr Karel Stephens while she is out of the office  Please let us know if you have any questions    Have a great day,   Migdalia

## 2022-06-29 ENCOUNTER — CLINICAL SUPPORT (OUTPATIENT)
Dept: FAMILY MEDICINE CLINIC | Facility: CLINIC | Age: 82
End: 2022-06-29
Payer: COMMERCIAL

## 2022-06-29 DIAGNOSIS — Z23 IMMUNIZATION DUE: Primary | ICD-10-CM

## 2022-06-29 PROCEDURE — 90677 PCV20 VACCINE IM: CPT

## 2022-06-29 PROCEDURE — G0009 ADMIN PNEUMOCOCCAL VACCINE: HCPCS

## 2022-08-02 ENCOUNTER — RA CDI HCC (OUTPATIENT)
Dept: OTHER | Facility: HOSPITAL | Age: 82
End: 2022-08-02

## 2022-08-02 NOTE — PROGRESS NOTES
[facility]  coding opportunities       Chart reviewed, no opportunity found: CHART REVIEWED, NO OPPORTUNITY FOUND        Patients Insurance     Medicare Insurance: [employer] [facility] Advantage

## 2022-08-18 ENCOUNTER — CONSULT (OUTPATIENT)
Dept: FAMILY MEDICINE CLINIC | Facility: CLINIC | Age: 82
End: 2022-08-18
Payer: COMMERCIAL

## 2022-08-18 VITALS
WEIGHT: 139.2 LBS | HEIGHT: 67 IN | BODY MASS INDEX: 21.85 KG/M2 | TEMPERATURE: 98.4 F | DIASTOLIC BLOOD PRESSURE: 80 MMHG | OXYGEN SATURATION: 97 % | RESPIRATION RATE: 16 BRPM | SYSTOLIC BLOOD PRESSURE: 140 MMHG | HEART RATE: 52 BPM

## 2022-08-18 DIAGNOSIS — Z01.818 PREOPERATIVE CLEARANCE: ICD-10-CM

## 2022-08-18 DIAGNOSIS — I10 HYPERTENSION, UNSPECIFIED TYPE: Primary | ICD-10-CM

## 2022-08-18 DIAGNOSIS — E78.5 HYPERLIPIDEMIA, UNSPECIFIED HYPERLIPIDEMIA TYPE: ICD-10-CM

## 2022-08-18 DIAGNOSIS — R93.0 RIGHT MAXILLARY SINUS OPACIFICATION: ICD-10-CM

## 2022-08-18 PROCEDURE — 3079F DIAST BP 80-89 MM HG: CPT | Performed by: INTERNAL MEDICINE

## 2022-08-18 PROCEDURE — 99214 OFFICE O/P EST MOD 30 MIN: CPT | Performed by: INTERNAL MEDICINE

## 2022-08-18 PROCEDURE — 1160F RVW MEDS BY RX/DR IN RCRD: CPT | Performed by: INTERNAL MEDICINE

## 2022-08-18 PROCEDURE — 3077F SYST BP >= 140 MM HG: CPT | Performed by: INTERNAL MEDICINE

## 2022-08-18 NOTE — PROGRESS NOTES
Assessment/Plan:           Problem List Items Addressed This Visit        Cardiovascular and Mediastinum    Hypertension - Primary       Other    Hyperlipidemia      Other Visit Diagnoses     Preoperative clearance        Right maxillary sinus opacification            patient has chronic medical problem optimized and is clear for upcoming surgery  Patient is at low-to-moderate risk from cardiovascular standpoint at this time without any additional cardiac testing  Reevaluation would be needed if he develops any symptoms prior to surgery  Subjective:      Patient ID: Heather Vinson is a 79 y o  male  HPI  Patient is here for upcoming right maxillary antrostomy scheduled for 921 due to complete right maxillary opacification  Patient has been suffering with persistent symptoms of rhinitis that have been refractory to medical treatment antrostomy has been recommended  Patient's blood pressure is good today  EKG does not show any new changes  Lab studies would become available soon  Patient has not had any troubles with anesthesia in the past   Patient is not on unknown MRSA carrier  He does not have sleep apnea  Patient may hold aspirin for 7 days prior to surgery  Blood pressure is good today  Patient denies any chest pain shortness a breath lightheadedness palpitation  The following portions of the patient's history were reviewed and updated as appropriate: allergies, current medications, past medical history, past social history, past surgical history and problem list     Review of Systems      Objective:      /80 (BP Location: Left arm, Patient Position: Sitting, Cuff Size: Adult)   Pulse (!) 52   Temp 98 4 °F (36 9 °C)   Resp 16   Ht 5' 7" (1 702 m)   Wt 63 1 kg (139 lb 3 2 oz)   SpO2 97%   BMI 21 80 kg/m²          Physical Exam  Cardiovascular:      Rate and Rhythm: Normal rate and regular rhythm  Heart sounds: Murmur (Diastolic murmur aortic area) heard     Pulmonary: Effort: Pulmonary effort is normal  No respiratory distress  Breath sounds: Normal breath sounds  Musculoskeletal:      Right lower leg: No edema  Left lower leg: No edema  Neurological:      Mental Status: He is alert

## 2022-08-19 DIAGNOSIS — F09 MILD COGNITIVE DISORDER: ICD-10-CM

## 2022-08-22 RX ORDER — DONEPEZIL HYDROCHLORIDE 10 MG/1
TABLET, FILM COATED ORAL
Qty: 90 TABLET | Refills: 1 | Status: SHIPPED | OUTPATIENT
Start: 2022-08-22

## 2022-08-23 ENCOUNTER — TELEPHONE (OUTPATIENT)
Dept: NEPHROLOGY | Facility: CLINIC | Age: 82
End: 2022-08-23

## 2022-08-23 DIAGNOSIS — N18.32 STAGE 3B CHRONIC KIDNEY DISEASE (HCC): Primary | ICD-10-CM

## 2022-08-23 NOTE — TELEPHONE ENCOUNTER
Patient was advised per Dr Darshan Saenz to hold Losartan one day before and the day of his procedure   Clearance form was faxed to Dr Sadie Vivar @ [phone]

## 2022-09-01 ENCOUNTER — APPOINTMENT (OUTPATIENT)
Dept: LAB | Age: 82
End: 2022-09-01
Payer: COMMERCIAL

## 2022-09-01 DIAGNOSIS — J32.0 CHRONIC MAXILLARY SINUSITIS: ICD-10-CM

## 2022-09-01 DIAGNOSIS — Z01.818 PRE-OP TESTING: ICD-10-CM

## 2022-09-01 DIAGNOSIS — I25.10 ATHEROSCLEROSIS OF NATIVE CORONARY ARTERY OF NATIVE HEART WITHOUT ANGINA PECTORIS: ICD-10-CM

## 2022-09-01 DIAGNOSIS — Z13.29 SCREENING FOR THYROID DISORDER: ICD-10-CM

## 2022-09-01 LAB
ANION GAP SERPL CALCULATED.3IONS-SCNC: 3 MMOL/L (ref 4–13)
BASOPHILS # BLD AUTO: 0.06 THOUSANDS/ΜL (ref 0–0.1)
BASOPHILS NFR BLD AUTO: 1 % (ref 0–1)
BUN SERPL-MCNC: 20 MG/DL (ref 5–25)
CALCIUM SERPL-MCNC: 9.1 MG/DL (ref 8.3–10.1)
CHLORIDE SERPL-SCNC: 109 MMOL/L (ref 96–108)
CHOLEST SERPL-MCNC: 154 MG/DL
CO2 SERPL-SCNC: 30 MMOL/L (ref 21–32)
CREAT SERPL-MCNC: 1.46 MG/DL (ref 0.6–1.3)
EOSINOPHIL # BLD AUTO: 0.3 THOUSAND/ΜL (ref 0–0.61)
EOSINOPHIL NFR BLD AUTO: 4 % (ref 0–6)
ERYTHROCYTE [DISTWIDTH] IN BLOOD BY AUTOMATED COUNT: 14.9 % (ref 11.6–15.1)
GFR SERPL CREATININE-BSD FRML MDRD: 44 ML/MIN/1.73SQ M
GLUCOSE P FAST SERPL-MCNC: 92 MG/DL (ref 65–99)
HCT VFR BLD AUTO: 44.3 % (ref 36.5–49.3)
HDLC SERPL-MCNC: 40 MG/DL
HGB BLD-MCNC: 14.5 G/DL (ref 12–17)
IMM GRANULOCYTES # BLD AUTO: 0.02 THOUSAND/UL (ref 0–0.2)
IMM GRANULOCYTES NFR BLD AUTO: 0 % (ref 0–2)
LDLC SERPL CALC-MCNC: 92 MG/DL (ref 0–100)
LYMPHOCYTES # BLD AUTO: 1.99 THOUSANDS/ΜL (ref 0.6–4.47)
LYMPHOCYTES NFR BLD AUTO: 27 % (ref 14–44)
MCH RBC QN AUTO: 31.4 PG (ref 26.8–34.3)
MCHC RBC AUTO-ENTMCNC: 32.7 G/DL (ref 31.4–37.4)
MCV RBC AUTO: 96 FL (ref 82–98)
MONOCYTES # BLD AUTO: 0.81 THOUSAND/ΜL (ref 0.17–1.22)
MONOCYTES NFR BLD AUTO: 11 % (ref 4–12)
NEUTROPHILS # BLD AUTO: 4.16 THOUSANDS/ΜL (ref 1.85–7.62)
NEUTS SEG NFR BLD AUTO: 57 % (ref 43–75)
NONHDLC SERPL-MCNC: 114 MG/DL
NRBC BLD AUTO-RTO: 0 /100 WBCS
PLATELET # BLD AUTO: 154 THOUSANDS/UL (ref 149–390)
PMV BLD AUTO: 11.7 FL (ref 8.9–12.7)
POTASSIUM SERPL-SCNC: 3.9 MMOL/L (ref 3.5–5.3)
RBC # BLD AUTO: 4.62 MILLION/UL (ref 3.88–5.62)
SODIUM SERPL-SCNC: 142 MMOL/L (ref 135–147)
TRIGL SERPL-MCNC: 108 MG/DL
TSH SERPL DL<=0.05 MIU/L-ACNC: 3.11 UIU/ML (ref 0.45–4.5)
WBC # BLD AUTO: 7.34 THOUSAND/UL (ref 4.31–10.16)

## 2022-09-01 PROCEDURE — 36415 COLL VENOUS BLD VENIPUNCTURE: CPT

## 2022-09-01 PROCEDURE — 85025 COMPLETE CBC W/AUTO DIFF WBC: CPT

## 2022-09-01 PROCEDURE — 80061 LIPID PANEL: CPT

## 2022-09-01 PROCEDURE — 80048 BASIC METABOLIC PNL TOTAL CA: CPT

## 2022-09-01 PROCEDURE — 84443 ASSAY THYROID STIM HORMONE: CPT

## 2022-09-24 ENCOUNTER — APPOINTMENT (OUTPATIENT)
Dept: LAB | Age: 82
End: 2022-09-24
Payer: COMMERCIAL

## 2022-09-24 LAB
ANION GAP SERPL CALCULATED.3IONS-SCNC: 5 MMOL/L (ref 4–13)
BUN SERPL-MCNC: 21 MG/DL (ref 5–25)
CALCIUM SERPL-MCNC: 9 MG/DL (ref 8.3–10.1)
CHLORIDE SERPL-SCNC: 108 MMOL/L (ref 96–108)
CO2 SERPL-SCNC: 29 MMOL/L (ref 21–32)
CREAT SERPL-MCNC: 1.57 MG/DL (ref 0.6–1.3)
GFR SERPL CREATININE-BSD FRML MDRD: 40 ML/MIN/1.73SQ M
GLUCOSE P FAST SERPL-MCNC: 92 MG/DL (ref 65–99)
POTASSIUM SERPL-SCNC: 4 MMOL/L (ref 3.5–5.3)
SODIUM SERPL-SCNC: 142 MMOL/L (ref 135–147)

## 2022-09-24 PROCEDURE — 36415 COLL VENOUS BLD VENIPUNCTURE: CPT

## 2022-09-24 PROCEDURE — 80048 BASIC METABOLIC PNL TOTAL CA: CPT

## 2022-09-26 ENCOUNTER — TELEPHONE (OUTPATIENT)
Dept: NEPHROLOGY | Facility: CLINIC | Age: 82
End: 2022-09-26

## 2022-09-26 NOTE — RESULT ENCOUNTER NOTE
Please inform patient that renal function is stable at creatinine of 1 57 mg/dl  Electrolytes are stable

## 2022-09-26 NOTE — TELEPHONE ENCOUNTER
----- Message from Michael Gonzalez MD sent at 9/25/2022 10:29 PM EDT -----  Please inform patient that renal function is stable at creatinine of 1 57 mg/dl  Electrolytes are stable

## 2022-09-30 ENCOUNTER — VBI (OUTPATIENT)
Dept: ADMINISTRATIVE | Facility: OTHER | Age: 82
End: 2022-09-30

## 2022-10-19 ENCOUNTER — CLINICAL SUPPORT (OUTPATIENT)
Dept: FAMILY MEDICINE CLINIC | Facility: CLINIC | Age: 82
End: 2022-10-19
Payer: COMMERCIAL

## 2022-10-19 DIAGNOSIS — Z23 IMMUNIZATION DUE: Primary | ICD-10-CM

## 2022-10-19 PROCEDURE — 90662 IIV NO PRSV INCREASED AG IM: CPT

## 2022-10-19 PROCEDURE — G0008 ADMIN INFLUENZA VIRUS VAC: HCPCS

## 2022-10-25 ENCOUNTER — RA CDI HCC (OUTPATIENT)
Dept: OTHER | Facility: HOSPITAL | Age: 82
End: 2022-10-25

## 2022-10-25 NOTE — PROGRESS NOTES
[facility]  coding opportunities       Chart reviewed, no opportunity found: CHART REVIEWED, NO OPPORTUNITY FOUND        Patients Insurance     Medicare Insurance: [employer] [facility] Advantage

## 2022-11-03 ENCOUNTER — OFFICE VISIT (OUTPATIENT)
Dept: FAMILY MEDICINE CLINIC | Facility: CLINIC | Age: 82
End: 2022-11-03

## 2022-11-03 VITALS
TEMPERATURE: 97.7 F | DIASTOLIC BLOOD PRESSURE: 74 MMHG | SYSTOLIC BLOOD PRESSURE: 118 MMHG | HEIGHT: 67 IN | BODY MASS INDEX: 21.97 KG/M2 | WEIGHT: 140 LBS | RESPIRATION RATE: 18 BRPM

## 2022-11-03 DIAGNOSIS — Z00.00 MEDICARE ANNUAL WELLNESS VISIT, SUBSEQUENT: Primary | ICD-10-CM

## 2022-11-03 DIAGNOSIS — F09 MILD COGNITIVE DISORDER: ICD-10-CM

## 2022-11-03 DIAGNOSIS — I10 HYPERTENSION, UNSPECIFIED TYPE: ICD-10-CM

## 2022-11-03 DIAGNOSIS — E78.5 HYPERLIPIDEMIA, UNSPECIFIED HYPERLIPIDEMIA TYPE: ICD-10-CM

## 2022-11-03 DIAGNOSIS — I25.10 CORONARY ARTERY DISEASE INVOLVING NATIVE CORONARY ARTERY OF NATIVE HEART WITHOUT ANGINA PECTORIS: ICD-10-CM

## 2022-11-03 DIAGNOSIS — N18.31 STAGE 3A CHRONIC KIDNEY DISEASE (HCC): ICD-10-CM

## 2022-11-03 RX ORDER — ROSUVASTATIN CALCIUM 10 MG/1
TABLET, COATED ORAL
COMMUNITY
Start: 2022-09-11

## 2022-11-03 RX ORDER — FEXOFENADINE HYDROCHLORIDE 60 MG/1
60 TABLET, FILM COATED ORAL DAILY
COMMUNITY

## 2022-11-03 NOTE — PROGRESS NOTES
Assessment and Plan:     Problem List Items Addressed This Visit        Cardiovascular and Mediastinum    Coronary artery disease involving native coronary artery of native heart without angina pectoris    Relevant Orders    CBC and differential    Hypertension       Genitourinary    Stage 3a chronic kidney disease ([address] )    Relevant Orders    Vitamin D Panel       Other    Hyperlipidemia    Relevant Medications    rosuvastatin (CRESTOR) 10 MG tablet    Other Relevant Orders    Comprehensive metabolic panel    Lipid panel      Other Visit Diagnoses     Medicare annual wellness visit, subsequent    -  Primary    Mild cognitive disorder        Relevant Orders    CT head wo contrast    CBC and differential    Vitamin B12           Preventive health issues were discussed with patient, and age appropriate screening tests were ordered as noted in patient's After Visit Summary  Personalized health advice and appropriate referrals for health education or preventive services given if needed, as noted in patient's After Visit Summary  History of Present Illness:     Patient presents for a Medicare Wellness Visit    Patient history of hypertension hyperlipidemia chronic kidney disease BPH is here to follow-up on chronic medical problems  Patient has mild cognitive deficit and has been on Aricept 5 mg for several years now  The decline has been slow and steady  Patient has been well tolerated Aricept 5 mg quite well  Patient denies any nausea vomiting and has been able to maintain weight  Heart rate has been good  ENT consultation reviewed  Patient had improved postnasal drainage and is taking Allegra  GI consultation capsule endoscopy report noted  Blood pressure is good today  Patient is tolerating statins are LDL was at target       Patient Care Team:  Lillian Olszewski, MD as PCP - General (Internal Medicine)  Mary Kay Brewer MD as PCP - PCP-[facility] Attributed-Anna Marie  MD Maura Brown MD (Nephrology)     Review of Systems:     Review of Systems     Problem List:     Patient Active Problem List   Diagnosis   • Atherosclerosis of coronary artery of native heart   • 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   • Stage 3a chronic kidney disease ([address] )   • Coronary artery disease involving native coronary artery of native heart without angina pectoris   • Essential hypertension   • Hyperlipidemia   • Hypertension   • Raynaud's syndrome without gangrene   • Left hip pain   • Subconjunctival hemorrhage of right eye   • It band syndrome, left   • History of malignant melanoma of skin   • Left leg pain   • Solar degeneration   • Vasomotor rhinitis   • Benign hypertension with chronic kidney disease, stage III (HCC)   • Persistent proteinuria   • Bilateral renal cysts   • BPH with obstruction/lower urinary tract symptoms   • Lesion of small intestine      Past Medical and Surgical History:     Past Medical History:   Diagnosis Date   • Basal cell carcinoma    • 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    • Coronary artery disease     CAD s/p stent 1995   • Elevated PSA     R   11/16/17   • Hypertension    • Wears glasses      Past Surgical History:   Procedure Laterality Date   • COLONOSCOPY     • INGUINAL HERNIA REPAIR     • MOHS SURGERY     • SINUS SURGERY Right 09/29/2022   • SKIN BIOPSY      basal cell    • TONSILLECTOMY        Family History:     Family History   Problem Relation Age of Onset   • COPD Mother    • Heart failure Mother    • Heart attack Father    • Lung cancer Brother    • No Known Problems Son    • No Known Problems Daughter    • No Known Problems Daughter       Social History:     Social History     Socioeconomic History   • Marital status: /Civil Union     Spouse name: Nikolay Sanchez    • Number of children: 3   • Years of education: None   • Highest education level: None   Occupational History   • 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      Comment: retired    Tobacco Use   • Smoking status: Never Smoker   • Smokeless tobacco: Never Used   Vaping Use   • Vaping Use: Never used   Substance and Sexual Activity   • Alcohol use: No   • Drug use: No   • Sexual activity: Yes     Partners: Female   Other Topics Concern   • None   Social History Narrative    Who lives in your home: wife     What type of home do you live in: Other [facility] home [place])     Age of your home: 2 5 yrs the portion where they live     How long have you been living there: 2017    Type of heat: Forced hot air    Type of fuel: Electric    What type of sadia is in your bedroom: Area rugs and Hardwood floor    Do you have the following in or near your home:    Air products: Central air    Pests: None    Pets: None    Are pets allowed in bedroom: N/A    Open fields, wooded areas nearby: Open fields    Basement: None    Exposure to second hand smoke: No        Habits:    Caffeine: soda occasionally-hot tea 1-2 cups daily     Chocolate: rarely     Other:     Social Determinants of Health     Financial Resource Strain: Low Risk    • Difficulty of Paying Living Expenses: Not hard at all   Food Insecurity: Not on file   Transportation Needs: No Transportation Needs   • Lack of Transportation (Medical): No   • Lack of Transportation (Non-Medical):  No   Physical Activity: Not on file   Stress: Not on file   Social Connections: Not on file   Intimate Partner Violence: Not on file   Housing Stability: Not on file      Medications and Allergies:     Current Outpatient Medications   Medication Sig Dispense Refill   • aspirin (ECOTRIN LOW STRENGTH) 81 mg EC tablet Take 81 mg by mouth     • b complex vitamins tablet Take 1 tablet by mouth daily     • Biotin w/ Vitamins C & E 1250-7 5-7 5 MCG-MG-UNT CHEW Chew     • Coenzyme Q10 (CO Q10) 100 MG CAPS Take by mouth     • donepezil (ARICEPT) 10 mg tablet TAKE 1 TABLET BY MOUTH EVERYDAY AT BEDTIME 90 tablet 1 • fexofenadine (ALLEGRA) 60 MG tablet Take 60 mg by mouth daily     • finasteride (PROSCAR) 5 mg tablet Take 1 tablet (5 mg total) by mouth daily 90 tablet 3   • losartan (COZAAR) 50 mg tablet TAKE 1 TABLET BY MOUTH EVERY DAY 90 tablet 1   • Multiple Vitamins-Minerals (MULTI-VITAMIN/MINERALS PO) Take by mouth     • pantoprazole (PROTONIX) 40 mg tablet TAKE 1 TABLET (40 MG TOTAL) BY MOUTH 2 (TWO) TIMES A DAY 30 MIN BEFORE BREAKFAST AND BEFORE DINNER 180 tablet 1   • rosuvastatin (CRESTOR) 10 MG tablet TAKE 1 TABLET BY MOUTH EVERY DAY AT NIGHT     • ipratropium (ATROVENT) 0 06 % nasal spray 2 sprays into each nostril 3 (three) times a day as needed for rhinitis (Patient not taking: No sig reported) 15 mL 3     No current facility-administered medications for this visit  No Known Allergies   Immunizations:     Immunization History   Administered Date(s) Administered   • COVID-19 MODERNA VACC 0 5 ML IM 01/13/2021, 02/10/2021, 10/27/2021, 06/03/2022   • COVID-19 Moderna Vac BIVALENT 18 Yr+ Im (BOOSTER ONLY) 10/28/2022   • Influenza Split High Dose Preservative Free IM 12/03/2013, 10/28/2014, 10/21/2015, 10/28/2016, 11/21/2017, 10/02/2019   • Influenza, high dose seasonal 0 7 mL 10/05/2018, 10/02/2019, 10/12/2021, 10/19/2022   • Influenza, seasonal, injectable 10/17/2007, 10/29/2008, 10/21/2009, 10/18/2010, 11/21/2011   • Pneumococcal Conjugate 13-Valent 05/06/2016   • Pneumococcal Conjugate Vaccine 20-valent (Pcv20), Polysace 06/29/2022   • Pneumococcal Polysaccharide PPV23 01/06/2006   • Unknown 01/13/2021   • Zoster 11/27/2007   • Zoster Vaccine Recombinant 10/16/2019, 01/08/2020      Health Maintenance:         Topic Date Due   • Hepatitis C Screening  Completed     There are no preventive care reminders to display for this patient  Medicare Screening Tests and Risk Assessments:     Ronald Cheadle is here for his Subsequent Wellness visit  Health Risk Assessment:   Patient rates overall health as very good  Patient feels that their physical health rating is same  Patient is satisfied with their life  Eyesight was rated as same  Hearing was rated as same  Patient feels that their emotional and mental health rating is same  Patients states they are never, rarely angry  Patient states they are sometimes unusually tired/fatigued  Pain experienced in the last 7 days has been none  Patient states that he has experienced no weight loss or gain in last 6 months  Depression Screening:   PHQ-2 Score: 0      Fall Risk Screening: In the past year, patient has experienced: no history of falling in past year      Home Safety:  Patient does not have trouble with stairs inside or outside of their home  Patient has working smoke alarms and has working carbon monoxide detector  Home safety hazards include: none  Nutrition:   Current diet is Regular  Medications:   Patient is currently taking over-the-counter supplements  OTC medications include: Vitamins  Patient is able to manage medications  Activities of Daily Living (ADLs)/Instrumental Activities of Daily Living (IADLs):   Walk and transfer into and out of bed and chair?: Yes  Dress and groom yourself?: Yes    Bathe or shower yourself?: Yes    Feed yourself? Yes  Do your laundry/housekeeping?: Yes  Manage your money, pay your bills and track your expenses?: Yes  Make your own meals?: Yes    Do your own shopping?: Yes    Previous Hospitalizations:   Any hospitalizations or ED visits within the last 12 months?: No      Advance Care Planning:   Living will: Yes    Durable POA for healthcare:  Yes    Advanced directive: Yes      Comments: Patient will bring us a copy    Cognitive Screening:   Provider or family/friend/caregiver concerned regarding cognition?: Yes    PREVENTIVE SCREENINGS      Cardiovascular Screening:    General: Screening Not Indicated and History Lipid Disorder      Diabetes Screening:     General: Screening Current      Colorectal Cancer Screening: General: Screening Not Indicated      Prostate Cancer Screening:    General: Screening Not Indicated      Osteoporosis Screening:      Due for: DXA Axial      Abdominal Aortic Aneurysm (AAA) Screening:        General: Screening Not Indicated      Lung Cancer Screening:     General: Screening Not Indicated      Hepatitis C Screening:    General: Screening Current    Screening, Brief Intervention, and Referral to Treatment (SBIRT)    Screening  Typical number of drinks in a day: 0  Typical number of drinks in a week: 1  Interpretation: Low risk drinking behavior  AUDIT-C Screenin) How often did you have a drink containing alcohol in the past year? monthly or less  2) How many drinks did you have on a typical day when you were drinking in the past year? 0  3) How often did you have 6 or more drinks on one occasion in the past year? never    AUDIT-C Score: 1  Interpretation: Score 0-3 (male): Negative screen for alcohol misuse    Single Item Drug Screening:  How often have you used an illegal drug (including marijuana) or a prescription medication for non-medical reasons in the past year? never    Single Item Drug Screen Score: 0  Interpretation: Negative screen for possible drug use disorder    No exam data present     Physical Exam:     /74   Temp 97 7 °F (36 5 °C)   Resp 18   Ht 5' 7" (1 702 m)   Wt 63 5 kg (140 lb)   BMI 21 93 kg/m²     Physical Exam  Eyes:      Extraocular Movements: Extraocular movements intact  Cardiovascular:      Rate and Rhythm: Normal rate and regular rhythm  Musculoskeletal:      Right lower leg: No edema  Left lower leg: No edema  Neurological:      General: No focal deficit present  Mental Status: He is alert  Cranial Nerves: No cranial nerve deficit  Motor: No weakness        Coordination: Coordination normal       Gait: Gait normal           Mike Mace MD

## 2022-11-04 ENCOUNTER — TELEPHONE (OUTPATIENT)
Dept: FAMILY MEDICINE CLINIC | Facility: CLINIC | Age: 82
End: 2022-11-04

## 2022-11-08 ENCOUNTER — TELEPHONE (OUTPATIENT)
Dept: FAMILY MEDICINE CLINIC | Facility: CLINIC | Age: 82
End: 2022-11-08

## 2022-11-10 ENCOUNTER — TELEPHONE (OUTPATIENT)
Dept: FAMILY MEDICINE CLINIC | Facility: CLINIC | Age: 82
End: 2022-11-10

## 2022-11-10 DIAGNOSIS — I25.10 CORONARY ARTERY DISEASE INVOLVING NATIVE CORONARY ARTERY OF NATIVE HEART WITHOUT ANGINA PECTORIS: Primary | ICD-10-CM

## 2022-11-10 DIAGNOSIS — I10 HYPERTENSION, UNSPECIFIED TYPE: ICD-10-CM

## 2022-11-10 DIAGNOSIS — E78.5 HYPERLIPIDEMIA, UNSPECIFIED HYPERLIPIDEMIA TYPE: ICD-10-CM

## 2022-11-10 DIAGNOSIS — R41.89 COGNITIVE DEFICITS: ICD-10-CM

## 2022-11-10 NOTE — TELEPHONE ENCOUNTER
Patients CT scan of the head was denied by insurance, patient would like to know what the next step should be

## 2022-11-17 DIAGNOSIS — N18.30 BENIGN HYPERTENSION WITH CHRONIC KIDNEY DISEASE, STAGE III (HCC): ICD-10-CM

## 2022-11-17 DIAGNOSIS — I12.9 BENIGN HYPERTENSION WITH CHRONIC KIDNEY DISEASE, STAGE III (HCC): ICD-10-CM

## 2022-11-17 RX ORDER — LOSARTAN POTASSIUM 50 MG/1
TABLET ORAL
Qty: 90 TABLET | Refills: 1 | Status: SHIPPED | OUTPATIENT
Start: 2022-11-17

## 2022-11-18 ENCOUNTER — VBI (OUTPATIENT)
Dept: ADMINISTRATIVE | Facility: OTHER | Age: 82
End: 2022-11-18

## 2022-12-02 ENCOUNTER — APPOINTMENT (OUTPATIENT)
Dept: LAB | Age: 82
End: 2022-12-02

## 2022-12-02 DIAGNOSIS — F09 MILD COGNITIVE DISORDER: ICD-10-CM

## 2022-12-02 DIAGNOSIS — N18.32 STAGE 3B CHRONIC KIDNEY DISEASE (HCC): ICD-10-CM

## 2022-12-03 LAB
ALBUMIN SERPL BCP-MCNC: 3.7 G/DL (ref 3.5–5)
ALP SERPL-CCNC: 69 U/L (ref 46–116)
ALT SERPL W P-5'-P-CCNC: 26 U/L (ref 12–78)
ANION GAP SERPL CALCULATED.3IONS-SCNC: 5 MMOL/L (ref 4–13)
AST SERPL W P-5'-P-CCNC: 26 U/L (ref 5–45)
BASOPHILS # BLD AUTO: 0.06 THOUSANDS/ÂΜL (ref 0–0.1)
BASOPHILS NFR BLD AUTO: 1 % (ref 0–1)
BILIRUB SERPL-MCNC: 0.63 MG/DL (ref 0.2–1)
BUN SERPL-MCNC: 23 MG/DL (ref 5–25)
CALCIUM SERPL-MCNC: 9.3 MG/DL (ref 8.3–10.1)
CHLORIDE SERPL-SCNC: 111 MMOL/L (ref 96–108)
CHOLEST SERPL-MCNC: 155 MG/DL
CO2 SERPL-SCNC: 27 MMOL/L (ref 21–32)
CREAT SERPL-MCNC: 1.54 MG/DL (ref 0.6–1.3)
CREAT UR-MCNC: 261 MG/DL
EOSINOPHIL # BLD AUTO: 0.25 THOUSAND/ÂΜL (ref 0–0.61)
EOSINOPHIL NFR BLD AUTO: 4 % (ref 0–6)
ERYTHROCYTE [DISTWIDTH] IN BLOOD BY AUTOMATED COUNT: 14.2 % (ref 11.6–15.1)
GFR SERPL CREATININE-BSD FRML MDRD: 41 ML/MIN/1.73SQ M
GLUCOSE P FAST SERPL-MCNC: 98 MG/DL (ref 65–99)
HCT VFR BLD AUTO: 46.7 % (ref 36.5–49.3)
HDLC SERPL-MCNC: 45 MG/DL
HGB BLD-MCNC: 15.1 G/DL (ref 12–17)
IMM GRANULOCYTES # BLD AUTO: 0.01 THOUSAND/UL (ref 0–0.2)
IMM GRANULOCYTES NFR BLD AUTO: 0 % (ref 0–2)
LDLC SERPL CALC-MCNC: 84 MG/DL (ref 0–100)
LYMPHOCYTES # BLD AUTO: 1.71 THOUSANDS/ÂΜL (ref 0.6–4.47)
LYMPHOCYTES NFR BLD AUTO: 24 % (ref 14–44)
MAGNESIUM SERPL-MCNC: 2.2 MG/DL (ref 1.6–2.6)
MCH RBC QN AUTO: 31.7 PG (ref 26.8–34.3)
MCHC RBC AUTO-ENTMCNC: 32.3 G/DL (ref 31.4–37.4)
MCV RBC AUTO: 98 FL (ref 82–98)
MONOCYTES # BLD AUTO: 0.84 THOUSAND/ÂΜL (ref 0.17–1.22)
MONOCYTES NFR BLD AUTO: 12 % (ref 4–12)
NEUTROPHILS # BLD AUTO: 4.19 THOUSANDS/ÂΜL (ref 1.85–7.62)
NEUTS SEG NFR BLD AUTO: 59 % (ref 43–75)
NONHDLC SERPL-MCNC: 110 MG/DL
NRBC BLD AUTO-RTO: 0 /100 WBCS
PHOSPHATE SERPL-MCNC: 3.5 MG/DL (ref 2.3–4.1)
PLATELET # BLD AUTO: 166 THOUSANDS/UL (ref 149–390)
PMV BLD AUTO: 11.6 FL (ref 8.9–12.7)
POTASSIUM SERPL-SCNC: 4.5 MMOL/L (ref 3.5–5.3)
PROT SERPL-MCNC: 7 G/DL (ref 6.4–8.4)
PROT UR-MCNC: 66 MG/DL
PROT/CREAT UR: 0.25 MG/G{CREAT} (ref 0–0.1)
PTH-INTACT SERPL-MCNC: 96.7 PG/ML (ref 18.4–80.1)
RBC # BLD AUTO: 4.77 MILLION/UL (ref 3.88–5.62)
SODIUM SERPL-SCNC: 143 MMOL/L (ref 135–147)
TRIGL SERPL-MCNC: 131 MG/DL
VIT B12 SERPL-MCNC: 752 PG/ML (ref 100–900)
WBC # BLD AUTO: 7.06 THOUSAND/UL (ref 4.31–10.16)

## 2022-12-06 ENCOUNTER — OFFICE VISIT (OUTPATIENT)
Dept: NEPHROLOGY | Facility: CLINIC | Age: 82
End: 2022-12-06

## 2022-12-06 VITALS
HEIGHT: 67 IN | BODY MASS INDEX: 21.97 KG/M2 | SYSTOLIC BLOOD PRESSURE: 130 MMHG | WEIGHT: 140 LBS | DIASTOLIC BLOOD PRESSURE: 70 MMHG

## 2022-12-06 DIAGNOSIS — N18.30 BENIGN HYPERTENSION WITH CHRONIC KIDNEY DISEASE, STAGE III (HCC): ICD-10-CM

## 2022-12-06 DIAGNOSIS — N13.8 BPH WITH OBSTRUCTION/LOWER URINARY TRACT SYMPTOMS: ICD-10-CM

## 2022-12-06 DIAGNOSIS — I12.9 BENIGN HYPERTENSION WITH CHRONIC KIDNEY DISEASE, STAGE III (HCC): ICD-10-CM

## 2022-12-06 DIAGNOSIS — N18.32 STAGE 3B CHRONIC KIDNEY DISEASE (HCC): Primary | ICD-10-CM

## 2022-12-06 DIAGNOSIS — R80.1 PERSISTENT PROTEINURIA: ICD-10-CM

## 2022-12-06 DIAGNOSIS — N40.1 BPH WITH OBSTRUCTION/LOWER URINARY TRACT SYMPTOMS: ICD-10-CM

## 2022-12-06 DIAGNOSIS — N25.81 SECONDARY HYPERPARATHYROIDISM OF RENAL ORIGIN (HCC): ICD-10-CM

## 2022-12-06 NOTE — PATIENT INSTRUCTIONS
-Renal Function is stable  -You have Chronic Kidney Disease Stage 3  -Avoid NSAIDs like Ibuprofen/Motrin, Naproxen/Aleve, Celebrex, meloxicam/Mobic, Diclofenac and other NSAIDs   -Okay to take Acetaminophen/Tylenol if you do not have any liver problems  -Avoid IV contrast used for CT scan and cardiac catheterization     -If plan for any study with IV contrast, please let me know so we could hydrate with fluids before and after IV contrast  -Dosage  of certain medications may need to be adjusted depending on Kidney function       BP stable     Follow up in 6 months with labs   Stressed on oral hydration

## 2022-12-06 NOTE — PROGRESS NOTES
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  - renal ultrasound in December 2020 did not show any hydronephrosis  - SPEP from December 2020 was negative for monoclonal protein, UPEP was negative for monoclonal protein and kappa lambda ratio was 1 19   -Plan:   • Continue to monitor renal function   Continue losartan at current dose  • Follow-up in 6 months with repeat labs  • Stressed on oral hydration  • Magnesium level within normal limit  • Kidney education class - completed in jan 2022     Primary Hypertension with CKD stage 3 :   -Current medication: losartan 50 mg daily    Previously was on amlodipine and HCTZ which was stopped during initial office visit with me and was started on losartan 50 mg daily   -Current blood pressure: BP stable and at goal   -Plan:    ? 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  -continue losartan 50 mg daily  -continue to monitor proteinuria with repeat labs in 6 months      Secondary hyperparathyroidism of renal origin  -PTH  level trending up to 96 7 from previous level of 67 7  -check vitamin D as well as PTH       Bilateral renal cyst  - renal ultrasound in December 2020 suggestive of   right kidney with simple appearing upper pole cyst measuring 3 x 2 5 x 2 6 cm and simple cortical cyst measuring 2 1 x 2 5 cm in size, and other simple cyst measuring 2 1 x 1 5 cm in size and finding of mild parapelvic cyst measuring 1 9 cm in size   left kidney showed upper pole cyst measuring 5 9 x 5 3 cm and a contiguous cyst measuring 5 1 x 4 cm in size with finding of flow within the septations  -renal ultrasound from September 2021 suggestive of right kidney with simple cortical and parapelvic cyst measuring 2 9 cm in size  Left kidney with 2 adjacent cyst versus single cyst with thin septation    One of the cyst measuring 5 4 x 6 4 cm in size and other 1 measuring 4 7 x 5 6 cm in size   -she underwent CT abdomen pelvis with contrast in December 2021 for suspected inguinal hernia, CT scan results reviewed, finding of bilateral simple cyst largest measuring 6 cm on the left and 3 cm on the right  -no need for further monitoring as CT abdomen suggestive of simple cyst   -also recommended discussion with Urology       BPH with lower urinary tract symptoms  - renal ultrasound suggestive of enlarged Prostate  - Continue Proscar/finasteride   -reviewed urology note from jan 2022      Hyperlipidemia:  On Crestor 5 mg  -last lipid panel from December 2022 showed LDL is at goal at 80  - continue same treatment    Patient Instructions   -Renal Function is stable  -You have Chronic Kidney Disease Stage 3  -Avoid NSAIDs like Ibuprofen/Motrin, Naproxen/Aleve, Celebrex, meloxicam/Mobic, Diclofenac and other NSAIDs   -Okay to take Acetaminophen/Tylenol if you do not have any liver problems  -Avoid IV contrast used for CT scan and cardiac catheterization     -If plan for any study with IV contrast, please let me know so we could hydrate with fluids before and after IV contrast  -Dosage  of certain medications may need to be adjusted depending on Kidney function  BP stable     Follow up in 6 months with labs   Stressed on oral hydration     Diagnoses and all orders for this visit:    Stage 3b chronic kidney disease ([facility] )  -     Basic metabolic panel; Future  -     Protein / creatinine ratio, urine; Future  -     PTH, intact; Future  -     Urinalysis with microscopic; Future  -     Vitamin D 25 hydroxy; Future  -     Phosphorus; Future  -     Magnesium; Future  -     CBC; Future    Benign hypertension with chronic kidney disease, stage III ([facility])  -     Basic metabolic panel; Future    Persistent proteinuria  -     Protein / creatinine ratio, urine; Future    Secondary hyperparathyroidism of renal origin ([facility] )  -     PTH, intact; Future    BPH with obstruction/lower urinary tract symptoms  -     Basic metabolic panel; Future            SUBJECTIVE / HPI:  Nickolas Funez is a 79 y o   male with medical issues of  Hypertension x , coronary artery disease, Raynaud's syndrome, BPH, hyperlipidemia who presents for initial consultation for  Chronic kidney disease stage 3      Patient has elevated creatinine 1 3-1 5 since 2016 when it was 1 32   In the year 2020 creatinine was around 1 47   Last blood work from December 30, 2020 showed creatinine 1 45   Most likely baseline creatinine is around 1 4-1 5   SPEP UPEP light chain ratio does not suggest paraproteinemia, K/L ratio 1 19     During initial office visit with me, amlodipine was stopped, HCTZ was stopped and patient was started on losartan 50 mg daily to help with proteinuria and since then blood pressure has been well controlled, does not have any lower extremity edema  Blood work from 12/2/2022 for creatinine 1 5  Electrolytes stable  Phosphorus and magnesium are normal range  PTH level slightly trended up to 96 7 from previous level of 67    Proteinuria stable at [facility] 0 25     C/o yellow urine  C/o backache       REVIEW OF SYSTEMS:    Review of Systems Constitutional: Negative for chills and fever  HENT: Negative for ear pain and sore throat  Eyes: Negative for pain and visual disturbance  Respiratory: Negative for cough and shortness of breath  Cardiovascular: Negative for chest pain and palpitations  Gastrointestinal: Negative for abdominal pain and vomiting  Genitourinary: Negative for dysuria and hematuria  Musculoskeletal: Negative for arthralgias and back pain  Skin: Negative for color change and rash  Neurological: Negative for seizures and syncope  All other systems reviewed and are negative  More than 10 point review of systems were obtained and discussed in length with the patient  Complete review of systems were negative / unremarkable except mentioned above  PAST MEDICAL HISTORY:  Past Medical History:   Diagnosis Date   • Basal cell carcinoma    • 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    • Coronary artery disease     CAD s/p stent 1995   • Elevated PSA     R   11/16/17   • Hypertension    • Wears glasses        PAST SURGICAL HISTORY:  Past Surgical History:   Procedure Laterality Date   • COLONOSCOPY     • INGUINAL HERNIA REPAIR     • MOHS SURGERY     • SINUS SURGERY Right 09/29/2022   • SKIN BIOPSY      basal cell    • TONSILLECTOMY         SOCIAL HISTORY:  Social History     Substance and Sexual Activity   Alcohol Use No     Social History     Substance and Sexual Activity   Drug Use No     Social History     Tobacco Use   Smoking Status Never   Smokeless Tobacco Never       FAMILY HISTORY:  Family History   Problem Relation Age of Onset   • COPD Mother    • Heart failure Mother    • Heart attack Father    • Lung cancer Brother    • No Known Problems Son    • No Known Problems Daughter    • No Known Problems Daughter        MEDICATIONS:    Current Outpatient Medications:   •  aspirin (ECOTRIN LOW STRENGTH) 81 mg EC tablet, Take 81 mg by mouth, Disp: , Rfl:   •  b complex vitamins tablet, Take 1 tablet by mouth daily, Disp: , Rfl:   •  Biotin w/ Vitamins C & E 1250-7 5-7 5 MCG-MG-UNT CHEW, Chew, Disp: , Rfl:   •  Coenzyme Q10 (CO Q10) 100 MG CAPS, Take by mouth, Disp: , Rfl:   •  donepezil (ARICEPT) 10 mg tablet, TAKE 1 TABLET BY MOUTH EVERYDAY AT BEDTIME, Disp: 90 tablet, Rfl: 1  •  finasteride (PROSCAR) 5 mg tablet, Take 1 tablet (5 mg total) by mouth daily, Disp: 90 tablet, Rfl: 3  •  losartan (COZAAR) 50 mg tablet, TAKE 1 TABLET BY MOUTH EVERY DAY, Disp: 90 tablet, Rfl: 1  •  Multiple Vitamins-Minerals (MULTI-VITAMIN/MINERALS PO), Take by mouth, Disp: , Rfl:   •  rosuvastatin (CRESTOR) 10 MG tablet, TAKE 1 TABLET BY MOUTH EVERY DAY AT NIGHT, Disp: , Rfl:   •  ipratropium (ATROVENT) 0 06 % nasal spray, 2 sprays into each nostril 3 (three) times a day as needed for rhinitis (Patient not taking: Reported on 6/21/2022), Disp: 15 mL, Rfl: 3      PHYSICAL EXAM:  Vitals:    12/06/22 1015   BP: 130/70   BP Location: Left arm   Patient Position: Sitting   Cuff Size: Adult   Weight: 63 5 kg (140 lb)   Height: 5' 7" (1 702 m)     Body mass index is 21 93 kg/m²  Physical Exam  Constitutional:       General: He is not in acute distress  Appearance: He is well-developed  He is not diaphoretic  HENT:      Head: Normocephalic and atraumatic  Mouth/Throat:      Mouth: Mucous membranes are moist    Eyes:      General: No scleral icterus  Conjunctiva/sclera: Conjunctivae normal       Pupils: Pupils are equal, round, and reactive to light  Neck:      Thyroid: No thyromegaly  Cardiovascular:      Rate and Rhythm: Normal rate and regular rhythm  Heart sounds: Normal heart sounds  No murmur heard  No friction rub  Pulmonary:      Effort: Pulmonary effort is normal  No respiratory distress  Breath sounds: Normal breath sounds  No wheezing or rales  Abdominal:      General: Bowel sounds are normal  There is no distension  Palpations: Abdomen is soft        Tenderness: There is no abdominal tenderness  Musculoskeletal:         General: No deformity  Cervical back: Neck supple  Right lower leg: No edema  Left lower leg: No edema  Lymphadenopathy:      Cervical: No cervical adenopathy  Skin:     Coloration: Skin is not pale  Nails: There is no clubbing  Neurological:      Mental Status: He is alert and oriented to person, place, and time  He is not disoriented  Psychiatric:         Mood and Affect: Mood normal  Mood is not anxious  Affect is not inappropriate  Behavior: Behavior normal          Thought Content:  Thought content normal          Lab Results:   Results for orders placed or performed in visit on 12/02/22   Vitamin B12   Result Value Ref Range    Vitamin B-12 752 100 - 900 pg/mL   Magnesium   Result Value Ref Range    Magnesium 2 2 1 6 - 2 6 mg/dL   Phosphorus   Result Value Ref Range    Phosphorus 3 5 2 3 - 4 1 mg/dL   Protein / creatinine ratio, urine   Result Value Ref Range    Creatinine, Ur 261 0 mg/dL    Protein Urine Random 66 mg/dL    Prot/Creat Ratio, Ur 0 25 (H) 0 00 - 0 10   PTH, intact   Result Value Ref Range    PTH 96 7 (H) 18 4 - 80 1 pg/mL

## 2022-12-08 ENCOUNTER — TELEPHONE (OUTPATIENT)
Dept: FAMILY MEDICINE CLINIC | Facility: CLINIC | Age: 82
End: 2022-12-08

## 2022-12-09 LAB
25(OH)D2 SERPL-MCNC: 1 NG/ML
25(OH)D3 SERPL-MCNC: 48 NG/ML
25(OH)D3+25(OH)D2 SERPL-MCNC: 49 NG/ML

## 2022-12-14 ENCOUNTER — TELEPHONE (OUTPATIENT)
Dept: FAMILY MEDICINE CLINIC | Facility: CLINIC | Age: 82
End: 2022-12-14

## 2022-12-14 NOTE — TELEPHONE ENCOUNTER
Patient's wife was given the message regarding her 's MRI and to follow up with Dr Wili Martinez in January  She said that there is Neurologist involved in his care at this time

## 2022-12-22 ENCOUNTER — VBI (OUTPATIENT)
Dept: ADMINISTRATIVE | Facility: OTHER | Age: 82
End: 2022-12-22

## 2023-01-10 ENCOUNTER — OFFICE VISIT (OUTPATIENT)
Dept: FAMILY MEDICINE CLINIC | Facility: CLINIC | Age: 83
End: 2023-01-10

## 2023-01-10 VITALS
HEART RATE: 56 BPM | DIASTOLIC BLOOD PRESSURE: 88 MMHG | HEIGHT: 67 IN | WEIGHT: 140.8 LBS | OXYGEN SATURATION: 96 % | SYSTOLIC BLOOD PRESSURE: 144 MMHG | BODY MASS INDEX: 22.1 KG/M2 | RESPIRATION RATE: 16 BRPM | TEMPERATURE: 97.8 F

## 2023-01-10 DIAGNOSIS — N18.31 STAGE 3A CHRONIC KIDNEY DISEASE (HCC): ICD-10-CM

## 2023-01-10 DIAGNOSIS — I12.9 BENIGN HYPERTENSION WITH CHRONIC KIDNEY DISEASE, STAGE III (HCC): ICD-10-CM

## 2023-01-10 DIAGNOSIS — N18.30 BENIGN HYPERTENSION WITH CHRONIC KIDNEY DISEASE, STAGE III (HCC): ICD-10-CM

## 2023-01-10 DIAGNOSIS — E78.5 HYPERLIPIDEMIA, UNSPECIFIED HYPERLIPIDEMIA TYPE: ICD-10-CM

## 2023-01-10 DIAGNOSIS — R41.3 DECLINE IN VERBAL MEMORY: Primary | ICD-10-CM

## 2023-01-10 NOTE — ASSESSMENT & PLAN NOTE
Lab Results   Component Value Date    EGFR 41 12/02/2022    EGFR 40 09/24/2022    EGFR 44 09/01/2022    CREATININE 1 54 (H) 12/02/2022    CREATININE 1 57 (H) 09/24/2022    CREATININE 1 46 (H) 09/01/2022   Stable under care of nephrology

## 2023-01-11 NOTE — PROGRESS NOTES
Assessment/Plan:    Stage 3a chronic kidney disease ([facility] )  Lab Results   Component Value Date    EGFR 41 12/02/2022    EGFR 40 09/24/2022    EGFR 44 09/01/2022    CREATININE 1 54 (H) 12/02/2022    CREATININE 1 57 (H) 09/24/2022    CREATININE 1 46 (H) 09/01/2022   Stable under care of nephrology          Problem List Items Addressed This Visit    None  Visit Diagnoses     Decline in verbal memory    -  Primary    Relevant Orders    MRI brain NeuroQuant wo contrast    Benign hypertension with chronic kidney disease, stage III ([facility] )                Subjective:      Patient ID: Kaia Hoskins is a 79 y o  male  HPI  Given history of mild cognitive decline with progression over the last 1 year now on Aricept 10 mg a day is here to follow-up on chronic medical problems as well as to discuss imaging  I had ordered MRI was denied as well as a CT scan of the head  Patient has been doing well on 10 mg of Aricept  Is independent with ADLs but does need reminders  He eats well maintaining weight  No bowel or bladder incontinence  No gait instability and no recent falls  No aggressive activity  Patient's wife encourages him to go for walks together  Studies are pending  Is good today  Tolerating statins well  Creatinine has been stable nephrology consultation reviewed  The following portions of the patient's history were reviewed and updated as appropriate: allergies, current medications, past family history, past medical history, past social history, past surgical history and problem list     Review of Systems      Objective:      /88 (BP Location: Left arm, Patient Position: Sitting, Cuff Size: Standard)   Pulse 56   Temp 97 8 °F (36 6 °C) (Tympanic)   Resp 16   Ht 5' 7" (1 702 m)   Wt 63 9 kg (140 lb 12 8 oz)   SpO2 96%   BMI 22 05 kg/m²          Physical Exam  Cardiovascular:      Rate and Rhythm: Regular rhythm  Bradycardia present  Heart sounds: Normal heart sounds   No murmur heard   Pulmonary:      Effort: Pulmonary effort is normal  No respiratory distress  Breath sounds: Normal breath sounds  No wheezing or rales  Abdominal:      General: There is no distension  Tenderness: There is no abdominal tenderness  There is no guarding  Musculoskeletal:      Right lower leg: No edema  Left lower leg: No edema  Neurological:      General: No focal deficit present  Mental Status: He is alert  Comments: Does not speak much  Santy Like appropriate answers

## 2023-01-19 ENCOUNTER — TELEPHONE (OUTPATIENT)
Dept: FAMILY MEDICINE CLINIC | Facility: CLINIC | Age: 83
End: 2023-01-19

## 2023-01-19 NOTE — TELEPHONE ENCOUNTER
Trishi called and states patient was scheduled for a MRI of the brain  It was previously denied and the insurance won't process it  Patient was referred to neurology  He has to wait 3 months otherwise he will have to see a specialist     Maia [place] with Maci Bills, patient's wife  I advised her Lakshmi Alice needs to cancel appointment  Patient is aware and understands

## 2023-01-30 ENCOUNTER — OFFICE VISIT (OUTPATIENT)
Dept: UROLOGY | Facility: AMBULATORY SURGERY CENTER | Age: 83
End: 2023-01-30

## 2023-01-30 VITALS
HEIGHT: 67 IN | BODY MASS INDEX: 21.66 KG/M2 | WEIGHT: 138 LBS | SYSTOLIC BLOOD PRESSURE: 124 MMHG | HEART RATE: 60 BPM | DIASTOLIC BLOOD PRESSURE: 78 MMHG

## 2023-01-30 DIAGNOSIS — N40.1 BENIGN PROSTATIC HYPERPLASIA WITH LOWER URINARY TRACT SYMPTOMS, SYMPTOM DETAILS UNSPECIFIED: Primary | ICD-10-CM

## 2023-01-30 DIAGNOSIS — N40.1 BENIGN LOCALIZED HYPERPLASIA OF PROSTATE WITH URINARY OBSTRUCTION AND LOWER URINARY TRACT SYMPTOMS: ICD-10-CM

## 2023-01-30 DIAGNOSIS — N13.9 BENIGN LOCALIZED HYPERPLASIA OF PROSTATE WITH URINARY OBSTRUCTION AND LOWER URINARY TRACT SYMPTOMS: ICD-10-CM

## 2023-01-30 LAB — POST-VOID RESIDUAL VOLUME, ML POC: 12 ML

## 2023-01-30 RX ORDER — FINASTERIDE 5 MG/1
5 TABLET, FILM COATED ORAL DAILY
Qty: 90 TABLET | Refills: 3 | Status: SHIPPED | OUTPATIENT
Start: 2023-01-30

## 2023-01-30 RX ORDER — ZINC GLUCONATE 50 MG
50 TABLET ORAL DAILY
COMMUNITY

## 2023-01-30 NOTE — PROGRESS NOTES
1/30/2023      Chief Complaint   Patient presents with   • Follow-up     Assessment and Plan    79 y o  male managed by our office    1  Benign prostatic hyperplasia with lower urinary tract symptoms  · Bladder scan PVR-12  · Uroflow-inconclusive due to voided amount  · Continue finasteride-prescription refill provided  · Continue to monitor for worsening/progression of lower urinary tract symptoms  · Discussed dietary and behavioral modifications to assist in irritative symptoms  · Follow-up in the office in 1 year with PVR, AUA      History of Present Illness  Tressa Goldman is a 79 y o  male here for follow up evaluation of  urinary symptoms secondary benign prostatic hyperplasia   Patient has a history of taking finasteride secondary to benign prostatic hyperplasia with good control of urinary symptoms  Buzz Yang currently has no lower urinary symptoms to complain of   He denies dysuria, hematuria, urinary frequency and urgency   He denies a family history of prostate cancer  Buzz Yang reports doing very well since his prior office visit  Buzz Yang reports getting up approximately 1-2 times a night to urinate  Review of Systems   Constitutional: Negative for chills and fever  Respiratory: Negative for cough and shortness of breath  Cardiovascular: Negative for chest pain  Gastrointestinal: Negative for abdominal distention, abdominal pain, blood in stool, nausea and vomiting  Genitourinary: Negative for difficulty urinating, dysuria, enuresis, flank pain, frequency, hematuria and urgency  Skin: Negative for rash             AUA SYMPTOM SCORE    Flowsheet Row Most Recent Value   AUA SYMPTOM SCORE    How often have you had a sensation of not emptying your bladder completely after you finished urinating? 0 (P)     How often have you had to urinate again less than two hours after you finished urinating? 5 (P)     How often have you found you stopped and started again several times when you urinate? 0 (P)     How often have you found it difficult to postpone urination? 0 (P)     How often have you had a weak urinary stream? 0 (P)     How often have you had to push or strain to begin urination? 0 (P)     How many times did you most typically get up to urinate from the time you went to bed at night until the time you got up in the morning? 1 (P)     Quality of Life: If you were to spend the rest of your life with your urinary condition just the way it is now, how would you feel about that? 0 (P)     AUA SYMPTOM SCORE 6 (P)              Past Medical History  Past Medical History:   Diagnosis Date   • Basal cell carcinoma    • 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    • Coronary artery disease     CAD s/p stent 1995   • Elevated PSA     R   11/16/17   • Hypertension    • Wears glasses        Past Social History  Past Surgical History:   Procedure Laterality Date   • COLONOSCOPY     • INGUINAL HERNIA REPAIR     • MOHS SURGERY     • SINUS SURGERY Right 09/29/2022   • SKIN BIOPSY      basal cell    • TONSILLECTOMY       Social History     Tobacco Use   Smoking Status Never   Smokeless Tobacco Never       Past Family History  Family History   Problem Relation Age of Onset   • COPD Mother    • Heart failure Mother    • Heart attack Father    • Lung cancer Brother    • No Known Problems Son    • No Known Problems Daughter    • No Known Problems Daughter        Past Social history  Social History     Socioeconomic History   • Marital status: /Civil Union     Spouse name: Barry Platt    • Number of children: 3   • Years of education: Not on file   • Highest education level: Not on file   Occupational History   • 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      Comment: retired    Tobacco Use   • Smoking status: Never   • Smokeless tobacco: Never   Vaping Use   • Vaping Use: Never used   Substance and Sexual Activity   • Alcohol use: No   • Drug use: No   • Sexual activity: Yes     Partners: Female   Other Topics Concern   • Not on file   Social History Narrative    Who lives in your home: wife     What type of home do you live in: Other half-way home [place])     Age of your home: 2 5 yrs the portion where they live     How long have you been living there: 2017    Type of heat: Forced hot air    Type of fuel: Electric    What type of sadia is in your bedroom: Area rugs and Hardwood floor    Do you have the following in or near your home:    Air products: Central air    Pests: None    Pets: None    Are pets allowed in bedroom: N/A    Open fields, wooded areas nearby: Open fields    Basement: None    Exposure to second hand smoke: No        Habits:    Caffeine: soda occasionally-hot tea 1-2 cups daily     Chocolate: rarely     Other:     Social Determinants of Health     Financial Resource Strain: Low Risk    • Difficulty of Paying Living Expenses: Not hard at all   Food Insecurity: Not on file   Transportation Needs: No Transportation Needs   • Lack of Transportation (Medical): No   • Lack of Transportation (Non-Medical):  No   Physical Activity: Not on file   Stress: Not on file   Social Connections: Not on file   Intimate Partner Violence: Not on file   Housing Stability: Not on file       Current Medications  Current Outpatient Medications   Medication Sig Dispense Refill   • aspirin (ECOTRIN LOW STRENGTH) 81 mg EC tablet Take 81 mg by mouth     • b complex vitamins tablet Take 1 tablet by mouth daily     • Biotin w/ Vitamins C & E 1250-7 5-7 5 MCG-MG-UNT CHEW Chew     • Coenzyme Q10 (CO Q10) 100 MG CAPS Take by mouth     • donepezil (ARICEPT) 10 mg tablet TAKE 1 TABLET BY MOUTH EVERYDAY AT BEDTIME 90 tablet 1   • finasteride (PROSCAR) 5 mg tablet Take 1 tablet (5 mg total) by mouth daily 90 tablet 3   • losartan (COZAAR) 50 mg tablet TAKE 1 TABLET BY MOUTH EVERY DAY 90 tablet 1   • Multiple Vitamins-Minerals (MULTI-VITAMIN/MINERALS PO) Take by mouth     • rosuvastatin (CRESTOR) 10 MG tablet TAKE 1 TABLET BY MOUTH EVERY DAY AT NIGHT     • zinc gluconate 50 mg tablet Take 50 mg by mouth daily     • ipratropium (ATROVENT) 0 06 % nasal spray 2 sprays into each nostril 3 (three) times a day as needed for rhinitis (Patient not taking: Reported on 6/21/2022) 15 mL 3     No current facility-administered medications for this visit  Allergies  No Known Allergies      The following portions of the patient's history were reviewed and updated as appropriate: allergies, current medications, past medical history, past social history, past surgical history and problem list       Vitals  Vitals:    01/30/23 1503   BP: 124/78   BP Location: Right arm   Patient Position: Sitting   Cuff Size: Adult   Pulse: 60   Weight: 62 6 kg (138 lb)   Height: 5' 7" (1 702 m)           Physical Exam  Physical Exam  Vitals reviewed  Constitutional:       General: He is not in acute distress  Appearance: Normal appearance  He is normal weight  HENT:      Head: Normocephalic  Pulmonary:      Effort: No respiratory distress  Breath sounds: Normal breath sounds  Skin:     General: Skin is warm and dry  Neurological:      General: No focal deficit present  Mental Status: He is alert and oriented to person, place, and time     Psychiatric:         Mood and Affect: Mood normal          Behavior: Behavior normal            Results  Recent Results (from the past 1 hour(s))   POCT Measure PVR    Collection Time: 01/30/23  3:15 PM   Result Value Ref Range    POST-VOID RESIDUAL VOLUME, ML POC 12 mL   ]  Lab Results   Component Value Date    PSA 2 1 11/13/2018     Lab Results   Component Value Date    CALCIUM 9 3 12/02/2022     09/12/2017    K 4 5 12/02/2022    CO2 27 12/02/2022     (H) 12/02/2022    BUN 23 12/02/2022    CREATININE 1 54 (H) 12/02/2022     Lab Results   Component Value Date    WBC 7 06 12/02/2022    HGB 15 1 12/02/2022    HCT 46 7 12/02/2022    MCV 98 12/02/2022     12/02/2022           Orders  Orders Placed This Encounter   Procedures   • POCT Measure PVR       NORMA Velásquez

## 2023-02-09 DIAGNOSIS — F09 MILD COGNITIVE DISORDER: ICD-10-CM

## 2023-02-09 RX ORDER — DONEPEZIL HYDROCHLORIDE 10 MG/1
TABLET, FILM COATED ORAL
Qty: 90 TABLET | Refills: 1 | Status: SHIPPED | OUTPATIENT
Start: 2023-02-09

## 2023-05-05 ENCOUNTER — LAB (OUTPATIENT)
Dept: LAB | Age: 83
End: 2023-05-05

## 2023-05-05 DIAGNOSIS — N40.1 BPH WITH OBSTRUCTION/LOWER URINARY TRACT SYMPTOMS: ICD-10-CM

## 2023-05-05 DIAGNOSIS — N13.8 BPH WITH OBSTRUCTION/LOWER URINARY TRACT SYMPTOMS: ICD-10-CM

## 2023-05-05 DIAGNOSIS — N25.81 SECONDARY HYPERPARATHYROIDISM OF RENAL ORIGIN (HCC): ICD-10-CM

## 2023-05-05 DIAGNOSIS — N18.30 BENIGN HYPERTENSION WITH CHRONIC KIDNEY DISEASE, STAGE III (HCC): ICD-10-CM

## 2023-05-05 DIAGNOSIS — N18.32 STAGE 3B CHRONIC KIDNEY DISEASE (HCC): ICD-10-CM

## 2023-05-05 DIAGNOSIS — I12.9 BENIGN HYPERTENSION WITH CHRONIC KIDNEY DISEASE, STAGE III (HCC): ICD-10-CM

## 2023-05-05 DIAGNOSIS — R80.1 PERSISTENT PROTEINURIA: ICD-10-CM

## 2023-05-05 LAB
25(OH)D3 SERPL-MCNC: 42.9 NG/ML (ref 30–100)
ANION GAP SERPL CALCULATED.3IONS-SCNC: 1 MMOL/L (ref 4–13)
BACTERIA UR QL AUTO: ABNORMAL /HPF
BILIRUB UR QL STRIP: NEGATIVE
BUN SERPL-MCNC: 25 MG/DL (ref 5–25)
CALCIUM SERPL-MCNC: 9 MG/DL (ref 8.3–10.1)
CHLORIDE SERPL-SCNC: 109 MMOL/L (ref 96–108)
CLARITY UR: CLEAR
CO2 SERPL-SCNC: 29 MMOL/L (ref 21–32)
COLOR UR: YELLOW
CREAT SERPL-MCNC: 1.46 MG/DL (ref 0.6–1.3)
CREAT UR-MCNC: 285 MG/DL
ERYTHROCYTE [DISTWIDTH] IN BLOOD BY AUTOMATED COUNT: 14.5 % (ref 11.6–15.1)
GFR SERPL CREATININE-BSD FRML MDRD: 43 ML/MIN/1.73SQ M
GLUCOSE P FAST SERPL-MCNC: 86 MG/DL (ref 65–99)
GLUCOSE UR STRIP-MCNC: NEGATIVE MG/DL
HCT VFR BLD AUTO: 42.3 % (ref 36.5–49.3)
HGB BLD-MCNC: 14.2 G/DL (ref 12–17)
HGB UR QL STRIP.AUTO: NEGATIVE
HYALINE CASTS #/AREA URNS LPF: ABNORMAL /LPF
KETONES UR STRIP-MCNC: NEGATIVE MG/DL
LEUKOCYTE ESTERASE UR QL STRIP: NEGATIVE
MAGNESIUM SERPL-MCNC: 2.3 MG/DL (ref 1.6–2.6)
MCH RBC QN AUTO: 32.3 PG (ref 26.8–34.3)
MCHC RBC AUTO-ENTMCNC: 33.6 G/DL (ref 31.4–37.4)
MCV RBC AUTO: 96 FL (ref 82–98)
MUCOUS THREADS UR QL AUTO: ABNORMAL
NITRITE UR QL STRIP: NEGATIVE
NON-SQ EPI CELLS URNS QL MICRO: ABNORMAL /HPF
PH UR STRIP.AUTO: 5.5 [PH]
PHOSPHATE SERPL-MCNC: 3.1 MG/DL (ref 2.3–4.1)
PLATELET # BLD AUTO: 156 THOUSANDS/UL (ref 149–390)
PMV BLD AUTO: 11.7 FL (ref 8.9–12.7)
POTASSIUM SERPL-SCNC: 4.2 MMOL/L (ref 3.5–5.3)
PROT UR STRIP-MCNC: ABNORMAL MG/DL
PROT UR-MCNC: 61 MG/DL
PROT/CREAT UR: 0.21 MG/G{CREAT} (ref 0–0.1)
PTH-INTACT SERPL-MCNC: 96.4 PG/ML (ref 18.4–80.1)
RBC # BLD AUTO: 4.39 MILLION/UL (ref 3.88–5.62)
RBC #/AREA URNS AUTO: ABNORMAL /HPF
SODIUM SERPL-SCNC: 139 MMOL/L (ref 135–147)
SP GR UR STRIP.AUTO: 1.03 (ref 1–1.03)
UROBILINOGEN UR STRIP-ACNC: <2 MG/DL
WBC # BLD AUTO: 5.81 THOUSAND/UL (ref 4.31–10.16)
WBC #/AREA URNS AUTO: ABNORMAL /HPF

## 2023-05-09 ENCOUNTER — RA CDI HCC (OUTPATIENT)
Dept: OTHER | Facility: HOSPITAL | Age: 83
End: 2023-05-09

## 2023-05-09 NOTE — PROGRESS NOTES
Skye [facility]  coding opportunities       Chart reviewed, no opportunity found: CHART REVIEWED, NO OPPORTUNITY FOUND        Patients Insurance     Medicare Insurance: [employer] [facility] Advantage

## 2023-05-18 ENCOUNTER — OFFICE VISIT (OUTPATIENT)
Dept: FAMILY MEDICINE CLINIC | Facility: CLINIC | Age: 83
End: 2023-05-18

## 2023-05-18 VITALS
BODY MASS INDEX: 22.29 KG/M2 | DIASTOLIC BLOOD PRESSURE: 86 MMHG | RESPIRATION RATE: 16 BRPM | HEART RATE: 57 BPM | OXYGEN SATURATION: 98 % | TEMPERATURE: 97.2 F | SYSTOLIC BLOOD PRESSURE: 130 MMHG | WEIGHT: 142 LBS | HEIGHT: 67 IN

## 2023-05-18 DIAGNOSIS — R41.3 DECLINE IN VERBAL MEMORY: Primary | ICD-10-CM

## 2023-05-18 DIAGNOSIS — N25.81 SECONDARY HYPERPARATHYROIDISM OF RENAL ORIGIN (HCC): ICD-10-CM

## 2023-05-18 DIAGNOSIS — N18.30 BENIGN HYPERTENSION WITH CHRONIC KIDNEY DISEASE, STAGE III (HCC): ICD-10-CM

## 2023-05-18 DIAGNOSIS — Z13.29 THYROID DISORDER SCREENING: ICD-10-CM

## 2023-05-18 DIAGNOSIS — F09 MILD COGNITIVE DISORDER: ICD-10-CM

## 2023-05-18 DIAGNOSIS — I12.9 BENIGN HYPERTENSION WITH CHRONIC KIDNEY DISEASE, STAGE III (HCC): ICD-10-CM

## 2023-05-18 RX ORDER — LOSARTAN POTASSIUM 50 MG/1
TABLET ORAL
Qty: 90 TABLET | Refills: 1 | Status: SHIPPED | OUTPATIENT
Start: 2023-05-18

## 2023-05-18 NOTE — PROGRESS NOTES
"Assessment/Plan:           Problem List Items Addressed This Visit        Endocrine    Secondary hyperparathyroidism of renal origin [facility])   Other Visit Diagnoses     Decline in verbal memory    -  Primary    Relevant Orders    MRI brain NeuroQuant wo contrast    Thyroid disorder screening        Relevant Orders    TSH, 3rd generation    CBC and differential    Mild cognitive disorder            See discussion above  Subjective:      Patient ID: Bryan Vargas is a 79 y o  male  HPI   Patient with history of hypertension hyperlipidemia coronary disease with memory changes is here to follow-up on chronic medical problems her blood pressure is excellent  His last lipid profile in December was fairly well  He is also on Aricept 10 mg a day  He is doing quite well on it he has gained a few pounds  Unfortunately the MRI of the brain was not approved by insurance  I would reorder it  His wife has seen certainly a decline as compared to last year  I consider increasing Aricept versus adding Namenda after reviewing the MRI  No behavioral issues  He is not driving  Lab studies unremarkable  He also complains of back pain localized to the lower area  No falls  Denies any weakness or numbness and tingling in his feet  X-ray lumbosacral area 421 showed significant disc space narrowing at L5-S1 with facet hypertrophy  He is using his to modify and over-the-counter with appreciable improvement  I suggested also taking topical Voltaren gel  Chronic kidney disease is stable    Vaccinations up-to-date  The following portions of the patient's history were reviewed and updated as appropriate: allergies, current medications, past family history, past medical history, past social history, past surgical history and problem list     Review of Systems      Objective:      /86   Pulse 57   Temp (!) 97 2 °F (36 2 °C)   Resp 16   Ht 5' 7\" (1 702 m)   Wt 64 4 kg (142 lb)   SpO2 98%   BMI 22 24 kg/m² " Physical Exam  Musculoskeletal:      Comments: Straightening of lumbar lordosis  Side-to-side movement creates mild discomfort  Straight leg raise is negative  Strength good bilateral equal  Sensations intact   Neurological:      Mental Status: He is alert        Gait: Gait normal       Comments: ConversationAnswers appropriately but much less conversation

## 2023-06-01 ENCOUNTER — HOSPITAL ENCOUNTER (OUTPATIENT)
Dept: RADIOLOGY | Age: 83
Discharge: HOME | End: 2023-06-01

## 2023-06-01 DIAGNOSIS — R41.3 DECLINE IN VERBAL MEMORY: ICD-10-CM

## 2023-06-15 ENCOUNTER — TELEPHONE (OUTPATIENT)
Dept: FAMILY MEDICINE CLINIC | Facility: CLINIC | Age: 83
End: 2023-06-15

## 2023-06-15 DIAGNOSIS — R41.89 COGNITIVE DEFICITS: Primary | ICD-10-CM

## 2023-06-15 DIAGNOSIS — F02.80 ALZHEIMER DISEASE (HCC): ICD-10-CM

## 2023-06-15 DIAGNOSIS — G30.9 ALZHEIMER DISEASE (HCC): ICD-10-CM

## 2023-06-15 RX ORDER — MEMANTINE HYDROCHLORIDE 5 MG/1
5 TABLET ORAL DAILY
Qty: 30 TABLET | Refills: 3 | Status: SHIPPED | OUTPATIENT
Start: 2023-06-15

## 2023-06-15 NOTE — TELEPHONE ENCOUNTER
Patient spouse called states that she's returning Dr Bennett's phone message regarding patient MRI  Please return call thank you

## 2023-07-01 DIAGNOSIS — F09 MILD COGNITIVE DISORDER: ICD-10-CM

## 2023-07-03 RX ORDER — DONEPEZIL HYDROCHLORIDE 10 MG/1
TABLET, FILM COATED ORAL
Qty: 90 TABLET | Refills: 1 | Status: SHIPPED | OUTPATIENT
Start: 2023-07-03

## 2023-07-08 DIAGNOSIS — G30.9 ALZHEIMER DISEASE (HCC): ICD-10-CM

## 2023-07-08 DIAGNOSIS — F02.80 ALZHEIMER DISEASE (HCC): ICD-10-CM

## 2023-07-10 RX ORDER — MEMANTINE HYDROCHLORIDE 5 MG/1
5 TABLET ORAL DAILY
Qty: 90 TABLET | Refills: 2 | Status: SHIPPED | OUTPATIENT
Start: 2023-07-10

## 2023-08-24 ENCOUNTER — TELEPHONE (OUTPATIENT)
Dept: NEPHROLOGY | Facility: CLINIC | Age: 83
End: 2023-08-24

## 2023-08-24 NOTE — TELEPHONE ENCOUNTER
Called pt to cancel 9/22 appt with kera kebede due to provider schedule changes. No answer, LVM for pt to call office to reschedule.  Pt placed on waitlist.

## 2023-09-11 ENCOUNTER — RA CDI HCC (OUTPATIENT)
Dept: OTHER | Facility: HOSPITAL | Age: 83
End: 2023-09-11

## 2023-09-11 NOTE — PROGRESS NOTES
[address] [facility] coding opportunities       Chart reviewed, no opportunity found: CHART REVIEWED, NO OPPORTUNITY FOUND     Patients Insurance     Medicare Insurance: [employer]

## 2023-09-18 ENCOUNTER — OFFICE VISIT (OUTPATIENT)
Dept: FAMILY MEDICINE CLINIC | Facility: CLINIC | Age: 83
End: 2023-09-18
Payer: COMMERCIAL

## 2023-09-18 VITALS
DIASTOLIC BLOOD PRESSURE: 78 MMHG | WEIGHT: 143 LBS | TEMPERATURE: 98.1 F | SYSTOLIC BLOOD PRESSURE: 110 MMHG | RESPIRATION RATE: 18 BRPM | HEIGHT: 67 IN | HEART RATE: 60 BPM | OXYGEN SATURATION: 97 % | BODY MASS INDEX: 22.44 KG/M2

## 2023-09-18 DIAGNOSIS — G30.9 ALZHEIMER DISEASE (HCC): ICD-10-CM

## 2023-09-18 DIAGNOSIS — F02.80 ALZHEIMER DISEASE (HCC): ICD-10-CM

## 2023-09-18 DIAGNOSIS — Z23 IMMUNIZATION DUE: Primary | ICD-10-CM

## 2023-09-18 PROCEDURE — 99213 OFFICE O/P EST LOW 20 MIN: CPT | Performed by: INTERNAL MEDICINE

## 2023-09-18 PROCEDURE — G0008 ADMIN INFLUENZA VIRUS VAC: HCPCS

## 2023-09-18 PROCEDURE — 90662 IIV NO PRSV INCREASED AG IM: CPT

## 2023-09-18 RX ORDER — MEMANTINE HYDROCHLORIDE 5 MG/1
5 TABLET ORAL 2 TIMES DAILY
Qty: 60 TABLET | Refills: 1 | Status: SHIPPED | OUTPATIENT
Start: 2023-09-18

## 2023-09-18 NOTE — PROGRESS NOTES
Assessment/Plan:           Problem List Items Addressed This Visit    None  Visit Diagnoses     Alzheimer disease ([address])        Relevant Medications    memantine (NAMENDA) 5 mg tablet        Shoulder flu vaccine today encouraged to get. Encouraged to get COVID and RSV vaccination as well. I will be seeing him back in a few months. Subjective:      Patient ID: Nallely Denise is a 79 y.o. male. HPI  Patient with history of enzymes dementia is here to follow-up on ongoing memory challenges. He is doing quite well on Namenda 5 mg once a day along with Aricept 10 mg a day. We would increase it to 5 mg twice a day. His weight has been stable appetite good. No behaviors. Patient is independent with grooming bathing eating. The following portions of the patient's history were reviewed and updated as appropriate: allergies, current medications, past medical history, past social history, past surgical history and problem list.    Review of Systems      Objective:      /78   Pulse 60   Temp 98.1 °F (36.7 °C)   Resp 18   Ht 5' 7" (1.702 m)   Wt 64.9 kg (143 lb)   SpO2 97%   BMI 22.40 kg/m²          Physical Exam  Cardiovascular:      Rate and Rhythm: Normal rate and regular rhythm. Heart sounds: No murmur heard. Pulmonary:      Effort: Pulmonary effort is normal. No respiratory distress. Breath sounds: Normal breath sounds. No wheezing or rales. Musculoskeletal:      Right lower leg: No edema. Left lower leg: No edema.       Comments: Small ganglionic cyst right ring finger

## 2023-09-26 DIAGNOSIS — N18.30 BENIGN HYPERTENSION WITH CHRONIC KIDNEY DISEASE, STAGE III (HCC): ICD-10-CM

## 2023-09-26 DIAGNOSIS — I12.9 BENIGN HYPERTENSION WITH CHRONIC KIDNEY DISEASE, STAGE III (HCC): ICD-10-CM

## 2023-09-26 RX ORDER — LOSARTAN POTASSIUM 50 MG/1
TABLET ORAL
Qty: 90 TABLET | Refills: 1 | Status: SHIPPED | OUTPATIENT
Start: 2023-09-26

## 2023-09-28 ENCOUNTER — APPOINTMENT (OUTPATIENT)
Dept: LAB | Age: 83
End: 2023-09-28
Payer: COMMERCIAL

## 2023-09-28 LAB
BASOPHILS # BLD AUTO: 0.06 THOUSANDS/ÂΜL (ref 0–0.1)
BASOPHILS NFR BLD AUTO: 1 % (ref 0–1)
EOSINOPHIL # BLD AUTO: 0.32 THOUSAND/ÂΜL (ref 0–0.61)
EOSINOPHIL NFR BLD AUTO: 4 % (ref 0–6)
ERYTHROCYTE [DISTWIDTH] IN BLOOD BY AUTOMATED COUNT: 14.4 % (ref 11.6–15.1)
HCT VFR BLD AUTO: 44.5 % (ref 36.5–49.3)
HGB BLD-MCNC: 15 G/DL (ref 12–17)
IMM GRANULOCYTES # BLD AUTO: 0.03 THOUSAND/UL (ref 0–0.2)
IMM GRANULOCYTES NFR BLD AUTO: 0 % (ref 0–2)
LYMPHOCYTES # BLD AUTO: 1.98 THOUSANDS/ÂΜL (ref 0.6–4.47)
LYMPHOCYTES NFR BLD AUTO: 26 % (ref 14–44)
MCH RBC QN AUTO: 32.4 PG (ref 26.8–34.3)
MCHC RBC AUTO-ENTMCNC: 33.7 G/DL (ref 31.4–37.4)
MCV RBC AUTO: 96 FL (ref 82–98)
MONOCYTES # BLD AUTO: 0.99 THOUSAND/ÂΜL (ref 0.17–1.22)
MONOCYTES NFR BLD AUTO: 13 % (ref 4–12)
NEUTROPHILS # BLD AUTO: 4.25 THOUSANDS/ÂΜL (ref 1.85–7.62)
NEUTS SEG NFR BLD AUTO: 56 % (ref 43–75)
NRBC BLD AUTO-RTO: 0 /100 WBCS
PLATELET # BLD AUTO: 145 THOUSANDS/UL (ref 149–390)
PMV BLD AUTO: 11.5 FL (ref 8.9–12.7)
RBC # BLD AUTO: 4.63 MILLION/UL (ref 3.88–5.62)
TSH SERPL DL<=0.05 MIU/L-ACNC: 4.27 UIU/ML (ref 0.45–4.5)
WBC # BLD AUTO: 7.63 THOUSAND/UL (ref 4.31–10.16)

## 2023-10-20 DIAGNOSIS — F02.80 ALZHEIMER DISEASE (HCC): ICD-10-CM

## 2023-10-20 DIAGNOSIS — G30.9 ALZHEIMER DISEASE (HCC): ICD-10-CM

## 2023-10-20 RX ORDER — MEMANTINE HYDROCHLORIDE 5 MG/1
5 TABLET ORAL 2 TIMES DAILY
Qty: 180 TABLET | Refills: 1 | Status: SHIPPED | OUTPATIENT
Start: 2023-10-20

## 2023-10-25 ENCOUNTER — OFFICE VISIT (OUTPATIENT)
Dept: NEPHROLOGY | Facility: CLINIC | Age: 83
End: 2023-10-25
Payer: COMMERCIAL

## 2023-10-25 VITALS
HEIGHT: 67 IN | BODY MASS INDEX: 23.54 KG/M2 | DIASTOLIC BLOOD PRESSURE: 70 MMHG | WEIGHT: 150 LBS | SYSTOLIC BLOOD PRESSURE: 110 MMHG

## 2023-10-25 DIAGNOSIS — N40.1 BENIGN LOCALIZED HYPERPLASIA OF PROSTATE WITH URINARY OBSTRUCTION AND LOWER URINARY TRACT SYMPTOMS: ICD-10-CM

## 2023-10-25 DIAGNOSIS — N18.30 BENIGN HYPERTENSION WITH CHRONIC KIDNEY DISEASE, STAGE III (HCC): ICD-10-CM

## 2023-10-25 DIAGNOSIS — N13.9 BENIGN LOCALIZED HYPERPLASIA OF PROSTATE WITH URINARY OBSTRUCTION AND LOWER URINARY TRACT SYMPTOMS: ICD-10-CM

## 2023-10-25 DIAGNOSIS — R80.1 PERSISTENT PROTEINURIA: ICD-10-CM

## 2023-10-25 DIAGNOSIS — N25.81 SECONDARY HYPERPARATHYROIDISM OF RENAL ORIGIN (HCC): ICD-10-CM

## 2023-10-25 DIAGNOSIS — N28.1 BILATERAL RENAL CYSTS: ICD-10-CM

## 2023-10-25 DIAGNOSIS — I12.9 BENIGN HYPERTENSION WITH CHRONIC KIDNEY DISEASE, STAGE III (HCC): ICD-10-CM

## 2023-10-25 DIAGNOSIS — N18.32 STAGE 3B CHRONIC KIDNEY DISEASE (HCC): Primary | ICD-10-CM

## 2023-10-25 PROCEDURE — 99214 OFFICE O/P EST MOD 30 MIN: CPT | Performed by: INTERNAL MEDICINE

## 2023-10-25 NOTE — PATIENT INSTRUCTIONS
-Renal Function is stable   -You have Chronic Kidney Disease Stage 3    -Avoid NSAIDs like Ibuprofen/Motrin, Naproxen/Aleve, Celebrex, meloxicam/Mobic, Diclofenac and other NSAIDs.  -Okay to take Acetaminophen/Tylenol if you do not have any liver problems  -Avoid IV contrast used for CT scan and cardiac catheterization.    -If plan for any study with IV contrast, please let me know so we could hydrate with fluids before and after IV contrast  -Dosage  of certain medications may need to be adjusted depending on Kidney function. Blood pressure is stable    -Recommend 2 g sodium diet. -Recommend daily exercise and weight loss  -Discussed home monitoring of BP and maintaining a log of blood pressure.  -Recommend goal BP less than 130/80. Please inform me if SBP below 110 or above 140's persistently. Labs this week. Follow up: 6  months with repeat Lab work within a week of the scheduled office visit. Will discuss the results of the previsit Labs during the office visit.

## 2023-10-25 NOTE — PROGRESS NOTES
NEPHROLOGY OUTPATIENT PROGRESS NOTE   Laurel Johnson 79 y.o. 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 - SPEP from December 2020 was negative for monoclonal protein, UPEP was negative for monoclonal protein and kappa lambda ratio was 1.19.  -Plan:   Renal function is stable, last blood work from May 2023 showed creatinine 1.46 mg/dL with stable electrolytes. Repeat BMP this week to monitor renal function, recommend oral hydration and avoiding nephrotoxins like NSAIDs and IV contrast.  BMP , PTH this week   Follow-up in 6 months with repeat labs  Kidney education class - completed in jan 2022     Primary Hypertension with CKD stage 3 :   -Current medication: losartan 50 mg daily .    -Previously was on amlodipine and HCTZ which was stopped during initial office visit with me and was started on losartan 50 mg daily.  -Current blood pressure: BP stable and is at goal continue current treatment. BP improved on repeat check without sweat shirt.  -Plan:    Continue losartan  -Recommend 2 g sodium diet. -Recommend daily exercise and weight loss  -Discussed home monitoring of BP and maintaining a log of blood pressure.  -Recommend goal BP less than 135/80.        Proteinuria, persistent  -has persistent proteinuria with onset since 2017, upc ratio from September 2017 was 194 milligram/gram,   microalbumin creatinine ratio from December 2020 was 80 milligram/gram  -proteinuria stable at 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 Secondary hyperparathyroidism of renal origin  -PTH  level trending up to 96.4 but has been stable on last 2 blood work, continue to monitor no need for calcitriol at this time  -Vitamin D at normal range at 42.9  -Monitor PTH level     Bilateral renal cyst  - renal ultrasound in December 2020 suggestive of right kidney with simple appearing upper pole cyst measuring 3 x 2.5 x 2.6 cm and simple cortical cyst measuring 2.1 x 2.5 cm in size, and other simple cyst measuring 2.1 x 1.5 cm in size and finding of mild parapelvic cyst measuring 1.9 cm in size   left kidney showed upper pole cyst measuring 5.9 x 5.3 cm and a contiguous cyst measuring 5.1 x 4 cm in size with finding of flow within the septations  -renal ultrasound from September 2021 suggestive of right kidney with simple cortical and parapelvic cyst measuring 2.9 cm in size. Left kidney with 2 adjacent cyst versus single cyst with thin septation. One of the cyst measuring 5.4 x 6.4 cm in size and other 1 measuring 4.7 x 5.6 cm in size.  -she underwent CT abdomen pelvis with contrast in December 2021 for suspected inguinal hernia, CT scan results reviewed, finding of bilateral simple cyst largest measuring 6 cm on the left and 3 cm on the right  -no need for further monitoring as CT abdomen suggestive of simple cyst and stable cyst  -Patient was also seen by urology.       BPH with lower urinary tract symptoms  - renal ultrasound suggestive of enlarged Prostate  - Continue Proscar/finasteride.  -Reviewed last urology note     Hyperlipidemia:  On Crestor 5 mg  -last lipid panel from December 2022 showed LDL is at goal at 80  - continue same treatment    Patient Instructions   -Renal Function is stable   -You have Chronic Kidney Disease Stage 3    -Avoid NSAIDs like Ibuprofen/Motrin, Naproxen/Aleve, Celebrex, meloxicam/Mobic, Diclofenac and other NSAIDs.  -Okay to take Acetaminophen/Tylenol if you do not have any liver problems  -Avoid IV contrast used for CT scan and cardiac catheterization.    -If plan for any study with IV contrast, please let me know so we could hydrate with fluids before and after IV contrast  -Dosage  of certain medications may need to be adjusted depending on Kidney function. Blood pressure is stable    -Recommend 2 g sodium diet. -Recommend daily exercise and weight loss  -Discussed home monitoring of BP and maintaining a log of blood pressure.  -Recommend goal BP less than 130/80. Please inform me if SBP below 110 or above 140's persistently. Labs this week. Follow up: 6  months with repeat Lab work within a week of the scheduled office visit. Will discuss the results of the previsit Labs during the office visit. Diagnoses and all orders for this visit:    Stage 3b chronic kidney disease ([address])  -     Basic metabolic panel; Future  -     PTH, intact; Future  -     Phosphorus; Future  -     Basic metabolic panel; Future  -     Protein / creatinine ratio, urine; Future  -     PTH, intact; Future  -     Magnesium; Future  -     Phosphorus; Future  -     Urinalysis with microscopic; Future    Benign hypertension with chronic kidney disease, stage III (HCC)  -     Basic metabolic panel; Future    Persistent proteinuria  -     Protein / creatinine ratio, urine; Future    Secondary hyperparathyroidism of renal origin ([address])  -     PTH, intact; Future  -     Vitamin D 25 hydroxy; Future    Bilateral renal cysts  -     Basic metabolic panel; Future    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  -     Basic metabolic panel; Future            SUBJECTIVE / HPI:  Damon Kern is a 79 y.o.  male with medical issues of  Hypertension x , coronary artery disease, Raynaud's syndrome, BPH, hyperlipidemia who presents for initial consultation for  Chronic kidney disease stage 3.      Patient has elevated creatinine 1.3-1.5 since 2016 when it was 1.32.  In the year 2020 creatinine was around 1.47. Last blood work from December 30, 2020 showed creatinine 1.45. Most likely baseline creatinine is around 1.4-1.5   SPEP UPEP light chain ratio does not suggest paraproteinemia, K/L ratio 1.19     During initial office visit with me, amlodipine was stopped, HCTZ was stopped and patient was started on losartan 50 mg daily to help with proteinuria and since then blood pressure has been well controlled, does not have any lower extremity edema. Reviewed last urology note from January 2023, patient was told to follow-up in 1 year and to continue finasteride  - Reviewed labs from 9/28 showed hemoglobin stable at 15.0 g/dL. TSH at normal range  -Other labs were from May 2023, as patient appointment was rescheduled. Labs from May 5, 2023 showed creatinine 1.46 mg/dL with stable electrolytes, EGFR 43. Normal phosphorus magnesium. PTH was slightly on the higher side at 96.4. UPC ratio stable at 0.21    -No new complaints         REVIEW OF SYSTEMS:    Review of Systems   Constitutional:  Negative for chills and fever. HENT:  Negative for ear pain and sore throat. Eyes:  Negative for pain and visual disturbance. Respiratory:  Negative for cough and shortness of breath. Cardiovascular:  Negative for chest pain and palpitations. Gastrointestinal:  Negative for abdominal pain and vomiting. Genitourinary:  Negative for dysuria and hematuria. Musculoskeletal:  Negative for arthralgias and back pain. Skin:  Negative for color change and rash. Neurological:  Negative for seizures and syncope. All other systems reviewed and are negative. More than 10 point review of systems were obtained and discussed in length with the patient. Complete review of systems were negative / unremarkable except mentioned above.        PAST MEDICAL HISTORY:  Past Medical History:   Diagnosis Date    Basal cell carcinoma     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     Coronary artery disease     CAD s/p stent 1995    Elevated PSA     R. ...11/16/17    Hypertension     Wears glasses        PAST SURGICAL HISTORY:  Past Surgical History:   Procedure Laterality Date    COLONOSCOPY      INGUINAL HERNIA REPAIR      MOHS SURGERY      SINUS SURGERY Right 09/29/2022    SKIN BIOPSY      basal cell     TONSILLECTOMY         SOCIAL HISTORY:  Social History     Substance and Sexual Activity   Alcohol Use No     Social History     Substance and Sexual Activity   Drug Use No     Social History     Tobacco Use   Smoking Status Never   Smokeless Tobacco Never       FAMILY HISTORY:  Family History   Problem Relation Age of Onset    COPD Mother     Heart failure Mother     Heart attack Father     Lung cancer Brother     No Known Problems Son     No Known Problems Daughter     No Known Problems Daughter        MEDICATIONS:    Current Outpatient Medications:     aspirin (ECOTRIN LOW STRENGTH) 81 mg EC tablet, Take 81 mg by mouth, Disp: , Rfl:     Azelastine HCl 137 MCG/SPRAY SOLN, SPRAY 1 SPRAY INTO EACH NOSTRIL 2 (TWO) TIMES A DAY.  USE IN EACH NOSTRIL AS DIRECTED, Disp: 90 mL, Rfl: 4    b complex vitamins tablet, Take 1 tablet by mouth daily, Disp: , Rfl:     Biotin w/ Vitamins C & E 1250-7.5-7.5 MCG-MG-UNT CHEW, Chew, Disp: , Rfl:     Coenzyme Q10 (CO Q10) 100 MG CAPS, Take by mouth, Disp: , Rfl:     donepezil (ARICEPT) 10 mg tablet, TAKE 1 TABLET BY MOUTH EVERYDAY AT BEDTIME, Disp: 90 tablet, Rfl: 1    finasteride (PROSCAR) 5 mg tablet, Take 1 tablet (5 mg total) by mouth daily, Disp: 90 tablet, Rfl: 3    losartan (COZAAR) 50 mg tablet, TAKE 1 TABLET BY MOUTH EVERY DAY, Disp: 90 tablet, Rfl: 1    memantine (NAMENDA) 5 mg tablet, TAKE 1 TABLET BY MOUTH TWICE A DAY, Disp: 180 tablet, Rfl: 1    Multiple Vitamins-Minerals (MULTI-VITAMIN/MINERALS PO), Take by mouth, Disp: , Rfl:     rosuvastatin (CRESTOR) 10 MG tablet, TAKE 1 TABLET BY MOUTH EVERY DAY AT NIGHT, Disp: , Rfl: zinc gluconate 50 mg tablet, Take 50 mg by mouth daily, Disp: , Rfl:     ipratropium (ATROVENT) 0.06 % nasal spray, 2 sprays into each nostril 3 (three) times a day as needed for rhinitis (Patient not taking: Reported on 6/21/2022), Disp: 15 mL, Rfl: 3      PHYSICAL EXAM:  Vitals:    10/25/23 1336 10/25/23 1347   BP: 150/94 110/70   BP Location: Left arm    Patient Position: Sitting    Cuff Size: Standard    Weight: 68 kg (150 lb)    Height: 5' 7" (1.702 m)      Body mass index is 23.49 kg/m². Physical Exam  Constitutional:       General: He is not in acute distress. Appearance: He is well-developed. He is not diaphoretic. HENT:      Head: Normocephalic and atraumatic. Mouth/Throat:      Mouth: Mucous membranes are moist.   Eyes:      General: No scleral icterus. Conjunctiva/sclera: Conjunctivae normal.      Pupils: Pupils are equal, round, and reactive to light. Neck:      Thyroid: No thyromegaly. Cardiovascular:      Rate and Rhythm: Normal rate and regular rhythm. Heart sounds: Normal heart sounds. No murmur heard. No friction rub. Pulmonary:      Effort: Pulmonary effort is normal. No respiratory distress. Breath sounds: Normal breath sounds. No wheezing or rales. Abdominal:      General: Bowel sounds are normal. There is no distension. Palpations: Abdomen is soft. Tenderness: There is no abdominal tenderness. Musculoskeletal:         General: No deformity. Cervical back: Neck supple. Right lower leg: No edema. Left lower leg: No edema. Lymphadenopathy:      Cervical: No cervical adenopathy. Skin:     Coloration: Skin is not pale. Nails: There is no clubbing. Neurological:      Mental Status: He is alert and oriented to person, place, and time. He is not disoriented. Psychiatric:         Mood and Affect: Mood normal. Mood is not anxious. Affect is not inappropriate. Behavior: Behavior normal.         Thought Content:  Thought content normal.         Lab Results:   Results for orders placed or performed in visit on 05/18/23   TSH, 3rd generation   Result Value Ref Range    TSH 3RD GENERATON 4.273 0.450 - 4.500 uIU/mL   CBC and differential   Result Value Ref Range    WBC 7.63 4.31 - 10.16 Thousand/uL    RBC 4.63 3.88 - 5.62 Million/uL    Hemoglobin 15.0 12.0 - 17.0 g/dL    Hematocrit 44.5 36.5 - 49.3 %    MCV 96 82 - 98 fL    MCH 32.4 26.8 - 34.3 pg    MCHC 33.7 31.4 - 37.4 g/dL    RDW 14.4 11.6 - 15.1 %    MPV 11.5 8.9 - 12.7 fL    Platelets 549 (L) 719 - 390 Thousands/uL    nRBC 0 /100 WBCs    Neutrophils Relative 56 43 - 75 %    Immat GRANS % 0 0 - 2 %    Lymphocytes Relative 26 14 - 44 %    Monocytes Relative 13 (H) 4 - 12 %    Eosinophils Relative 4 0 - 6 %    Basophils Relative 1 0 - 1 %    Neutrophils Absolute 4.25 1.85 - 7.62 Thousands/µL    Immature Grans Absolute 0.03 0.00 - 0.20 Thousand/uL    Lymphocytes Absolute 1.98 0.60 - 4.47 Thousands/µL    Monocytes Absolute 0.99 0.17 - 1.22 Thousand/µL    Eosinophils Absolute 0.32 0.00 - 0.61 Thousand/µL    Basophils Absolute 0.06 0.00 - 0.10 Thousands/µL

## 2023-10-30 ENCOUNTER — LAB (OUTPATIENT)
Dept: LAB | Age: 83
End: 2023-10-30
Payer: COMMERCIAL

## 2023-10-30 DIAGNOSIS — N18.32 STAGE 3B CHRONIC KIDNEY DISEASE (HCC): ICD-10-CM

## 2023-10-30 LAB
ANION GAP SERPL CALCULATED.3IONS-SCNC: 7 MMOL/L
BUN SERPL-MCNC: 24 MG/DL (ref 5–25)
CALCIUM SERPL-MCNC: 9.4 MG/DL (ref 8.4–10.2)
CHLORIDE SERPL-SCNC: 106 MMOL/L (ref 96–108)
CO2 SERPL-SCNC: 29 MMOL/L (ref 21–32)
CREAT SERPL-MCNC: 1.45 MG/DL (ref 0.6–1.3)
GFR SERPL CREATININE-BSD FRML MDRD: 43 ML/MIN/1.73SQ M
GLUCOSE P FAST SERPL-MCNC: 83 MG/DL (ref 65–99)
PHOSPHATE SERPL-MCNC: 3.6 MG/DL (ref 2.3–4.1)
POTASSIUM SERPL-SCNC: 3.9 MMOL/L (ref 3.5–5.3)
PTH-INTACT SERPL-MCNC: 64.9 PG/ML (ref 12–88)
SODIUM SERPL-SCNC: 142 MMOL/L (ref 135–147)

## 2023-10-30 PROCEDURE — 80048 BASIC METABOLIC PNL TOTAL CA: CPT

## 2023-10-30 PROCEDURE — 36415 COLL VENOUS BLD VENIPUNCTURE: CPT

## 2023-10-30 PROCEDURE — 83970 ASSAY OF PARATHORMONE: CPT

## 2023-10-30 PROCEDURE — 84100 ASSAY OF PHOSPHORUS: CPT

## 2023-11-01 ENCOUNTER — TELEPHONE (OUTPATIENT)
Dept: NEPHROLOGY | Facility: CLINIC | Age: 83
End: 2023-11-01

## 2023-11-01 NOTE — TELEPHONE ENCOUNTER
----- Message from Leeanna Clark MD sent at 11/1/2023  8:25 AM EDT -----  Please inform patient that renal function is stable at creatinine 1.45 mg/dL, electrolytes are stable.   Continue same treatment

## 2023-11-01 NOTE — RESULT ENCOUNTER NOTE
Please inform patient that renal function is stable at creatinine 1.45 mg/dL, electrolytes are stable.   Continue same treatment

## 2023-11-15 ENCOUNTER — RA CDI HCC (OUTPATIENT)
Dept: OTHER | Facility: HOSPITAL | Age: 83
End: 2023-11-15

## 2023-11-28 ENCOUNTER — OFFICE VISIT (OUTPATIENT)
Dept: FAMILY MEDICINE CLINIC | Facility: CLINIC | Age: 83
End: 2023-11-28
Payer: COMMERCIAL

## 2023-11-28 VITALS
OXYGEN SATURATION: 99 % | BODY MASS INDEX: 23.07 KG/M2 | WEIGHT: 147 LBS | HEART RATE: 59 BPM | RESPIRATION RATE: 18 BRPM | TEMPERATURE: 97.6 F | DIASTOLIC BLOOD PRESSURE: 74 MMHG | SYSTOLIC BLOOD PRESSURE: 130 MMHG | HEIGHT: 67 IN

## 2023-11-28 DIAGNOSIS — Z00.00 MEDICARE ANNUAL WELLNESS VISIT, SUBSEQUENT: Primary | ICD-10-CM

## 2023-11-28 DIAGNOSIS — I25.10 CORONARY ARTERY DISEASE INVOLVING NATIVE CORONARY ARTERY OF NATIVE HEART WITHOUT ANGINA PECTORIS: ICD-10-CM

## 2023-11-28 DIAGNOSIS — N18.32 STAGE 3B CHRONIC KIDNEY DISEASE (HCC): ICD-10-CM

## 2023-11-28 DIAGNOSIS — I10 ESSENTIAL HYPERTENSION: ICD-10-CM

## 2023-11-28 DIAGNOSIS — D69.6 PLATELETS DECREASED (HCC): ICD-10-CM

## 2023-11-28 DIAGNOSIS — G30.9 ALZHEIMER DISEASE (HCC): ICD-10-CM

## 2023-11-28 DIAGNOSIS — E78.5 HYPERLIPIDEMIA, UNSPECIFIED HYPERLIPIDEMIA TYPE: ICD-10-CM

## 2023-11-28 DIAGNOSIS — F02.80 ALZHEIMER DISEASE (HCC): ICD-10-CM

## 2023-11-28 PROBLEM — H11.31 SUBCONJUNCTIVAL HEMORRHAGE OF RIGHT EYE: Status: RESOLVED | Noted: 2018-10-03 | Resolved: 2023-11-28

## 2023-11-28 PROCEDURE — G0439 PPPS, SUBSEQ VISIT: HCPCS | Performed by: INTERNAL MEDICINE

## 2023-11-28 PROCEDURE — 99214 OFFICE O/P EST MOD 30 MIN: CPT | Performed by: INTERNAL MEDICINE

## 2023-11-28 RX ORDER — MEMANTINE HYDROCHLORIDE 5 MG/1
TABLET ORAL
Qty: 180 TABLET | Refills: 1 | Status: SHIPPED | OUTPATIENT
Start: 2023-11-28

## 2023-11-28 NOTE — PROGRESS NOTES
Patient history of dementia assessment and Plan:     Problem List Items Addressed This Visit       Coronary artery disease involving native coronary artery of native heart without angina pectoris    Essential hypertension    Hyperlipidemia    Stage 3b chronic kidney disease ([address])    Platelets decreased ([address])     CBC ordered          Other Visit Diagnoses       Medicare annual wellness visit, subsequent    -  Primary    Alzheimer disease ([address])        Relevant Medications    memantine (NAMENDA) 5 mg tablet            Depression Screening and Follow-up Plan: Patient was screened for depression during today's encounter. They screened negative with a PHQ-2 score of 0. Preventive health issues were discussed with patient, and age appropriate screening tests were ordered as noted in patient's After Visit Summary. Personalized health advice and appropriate referrals for health education or preventive services given if needed, as noted in patient's After Visit Summary. History of Present Illness:     Patient presents for a Medicare Wellness Visit  Patient with history of hyperlipidemia dementia BPH chronic kidney disease is here to follow-up chronic medical problems. Blood pressure is good heart rate has been stable. He has been living and currently in [address] with his wife and has been doing very well overall. He has been on Namenda 5 mg twice daily along with Aricept 10 mg a day and no concerns are reported. His wife does agree to increasing Namenda to 10 mg in the morning along with 5 mg of Namenda in the evening. I will reevaluate him in 4 months and may increase his evening dose as well. Overall he has been stable. No significant memory decline. The wife does mention periods of emotional lability passes quickly. No significant agitation. Patient does not feel depressed. He has 2 children who are live in the area and are involved in his life. recent lab work was reviewed.   Kidney functions have been stable. Patient did flu RSV vaccination. He will be getting COVID vaccination after he has his upcoming skin procedure. HPI   Patient Care Team:  Richie Snider MD as PCP - General (Internal Medicine)  Florecita Hernandez MD as PCP - PCP-[facility] Attributed-[facility]  MD Vik Graff MD (Nephrology)     Review of Systems:     Review of Systems     Problem List:     Patient Active Problem List   Diagnosis    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    Coronary artery disease involving native coronary artery of native heart without angina pectoris    Essential hypertension    Hyperlipidemia    Raynaud's syndrome without gangrene    Left hip pain    It band syndrome, left    History of malignant melanoma of skin    Left leg pain    Solar degeneration    Vasomotor rhinitis    Stage 3b chronic kidney disease (HCC)    Persistent proteinuria    Bilateral renal cysts    BPH with obstruction/lower urinary tract symptoms    Lesion of small intestine    Secondary hyperparathyroidism of renal origin ([address])    Benign hypertension with chronic kidney disease, stage III ([address])    Platelets decreased ([address])      Past Medical and Surgical History:     Past Medical History:   Diagnosis Date    Basal cell carcinoma     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     Coronary artery disease     CAD s/p stent 1995    Elevated PSA     R. ...11/16/17    Hypertension     Wears glasses      Past Surgical History:   Procedure Laterality Date    COLONOSCOPY      INGUINAL HERNIA REPAIR      MOHS SURGERY      SINUS SURGERY Right 09/29/2022    SKIN BIOPSY      basal cell     TONSILLECTOMY        Family History:     Family History   Problem Relation Age of Onset    COPD Mother     Heart failure Mother     Heart attack Father     Lung cancer Brother     No Known Problems Son     No Known Problems Daughter     No Known Problems Daughter Social History:     Social History     Socioeconomic History    Marital status: /Civil Union     Spouse name: Redd Hopkins     Number of children: 3    Years of education: None    Highest education level: None   Occupational History    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      Comment: retired    Tobacco Use    Smoking status: Never    Smokeless tobacco: Never   Vaping Use    Vaping Use: Never used   Substance and Sexual Activity    Alcohol use: No    Drug use: No    Sexual activity: Yes     Partners: Female   Other Topics Concern    None   Social History Narrative    Who lives in your home: wife     What type of home do you live in: Other half-way home [place])     Age of your home: 2.5 yrs the portion where they live     How long have you been living there: 2017    Type of heat: Forced hot air    Type of fuel: Electric    What type of sadia is in your bedroom: Area rugs and Hardwood floor    Do you have the following in or near your home:    Air products: Central air    Pests: None    Pets: None    Are pets allowed in bedroom: N/A    Open fields, wooded areas nearby: Open fields    Basement: None    Exposure to second hand smoke: No        Habits:    Caffeine: soda occasionally-hot tea 1-2 cups daily     Chocolate: rarely     Other:     Social Determinants of Health     Financial Resource Strain: Low Risk  (11/27/2023)    Overall Financial Resource Strain (CARDIA)     Difficulty of Paying Living Expenses: Not hard at all   Food Insecurity: Not on file   Transportation Needs: No Transportation Needs (11/27/2023)    PRAPARE - Transportation     Lack of Transportation (Medical): No     Lack of Transportation (Non-Medical):  No   Physical Activity: Sufficiently Active (7/29/2020)    Exercise Vital Sign     Days of Exercise per Week: 7 days     Minutes of Exercise per Session: 30 min   Stress: Not on file   Social Connections: Not on file   Intimate Partner Violence: Not on file   Housing Stability: Not on file Medications and Allergies:     Current Outpatient Medications   Medication Sig Dispense Refill    memantine (NAMENDA) 5 mg tablet 2 pills in AM and 1 in Pm 180 tablet 1    aspirin (ECOTRIN LOW STRENGTH) 81 mg EC tablet Take 81 mg by mouth      Azelastine HCl 137 MCG/SPRAY SOLN SPRAY 1 SPRAY INTO EACH NOSTRIL 2 (TWO) TIMES A DAY. USE IN EACH NOSTRIL AS DIRECTED 90 mL 4    b complex vitamins tablet Take 1 tablet by mouth daily      Biotin w/ Vitamins C & E 1250-7.5-7.5 MCG-MG-UNT CHEW Chew      Coenzyme Q10 (CO Q10) 100 MG CAPS Take by mouth      donepezil (ARICEPT) 10 mg tablet TAKE 1 TABLET BY MOUTH EVERYDAY AT BEDTIME 90 tablet 1    finasteride (PROSCAR) 5 mg tablet Take 1 tablet (5 mg total) by mouth daily 90 tablet 3    ipratropium (ATROVENT) 0.06 % nasal spray 2 sprays into each nostril 3 (three) times a day as needed for rhinitis (Patient not taking: Reported on 6/21/2022) 15 mL 3    losartan (COZAAR) 50 mg tablet TAKE 1 TABLET BY MOUTH EVERY DAY 90 tablet 1    Multiple Vitamins-Minerals (MULTI-VITAMIN/MINERALS PO) Take by mouth      rosuvastatin (CRESTOR) 10 MG tablet TAKE 1 TABLET BY MOUTH EVERY DAY AT NIGHT      zinc gluconate 50 mg tablet Take 50 mg by mouth daily       No current facility-administered medications for this visit.      No Known Allergies   Immunizations:     Immunization History   Administered Date(s) Administered    COVID-19 MODERNA VACC 0.5 ML IM 01/13/2021, 02/10/2021, 10/27/2021, 06/03/2022    COVID-19 Moderna Vac BIVALENT 12 Yr+ IM 0.5 ML 10/28/2022    COVID-19, unspecified 01/13/2021, 10/27/2021, 06/03/2022    INFLUENZA 10/19/2022, 09/18/2023    Influenza Split High Dose Preservative Free IM 12/03/2013, 10/28/2014, 10/21/2015, 10/28/2016, 11/21/2017, 10/02/2019    Influenza, high dose seasonal 0.7 mL 10/05/2018, 10/02/2019, 10/12/2021, 10/19/2022, 09/18/2023    Influenza, seasonal, injectable 10/17/2007, 10/29/2008, 10/21/2009, 10/18/2010, 11/21/2011    Pneumococcal Conjugate 13-Valent 05/06/2016    Pneumococcal Conjugate Vaccine 20-valent (Pcv20), Polysace 06/29/2022    Pneumococcal Polysaccharide PPV23 01/06/2006    Respiratory Syncytial Virus Vaccine (Recombinant) 10/05/2023    Unknown 01/13/2021    Zoster 11/27/2007    Zoster Vaccine Recombinant 10/16/2019, 01/08/2020      Health Maintenance:         Topic Date Due    Hepatitis C Screening  Completed     There are no preventive care reminders to display for this patient. Medicare Screening Tests and Risk Assessments:     Man Malcolm is here for his Subsequent Wellness visit. Health Risk Assessment:   Patient rates overall health as very good. Patient feels that their physical health rating is same. Patient is satisfied with their life. Eyesight was rated as same. Hearing was rated as same. Patient feels that their emotional and mental health rating is slightly worse. Patients states they are sometimes angry. Patient states they are sometimes unusually tired/fatigued. Pain experienced in the last 7 days has been none. Patient states that he has experienced no weight loss or gain in last 6 months. Depression Screening:   PHQ-2 Score: 0      Fall Risk Screening: In the past year, patient has experienced: no history of falling in past year      Home Safety:  Patient does not have trouble with stairs inside or outside of their home. Patient has working smoke alarms and has working carbon monoxide detector. Home safety hazards include: none. Nutrition:   Current diet is Regular. Medications:   Patient is currently taking over-the-counter supplements. OTC medications include: see medication list. Patient is not able to manage medications. Activities of Daily Living (ADLs)/Instrumental Activities of Daily Living (IADLs):   Walk and transfer into and out of bed and chair?: Yes  Dress and groom yourself?: Yes    Bathe or shower yourself?: Yes    Feed yourself?  Yes  Do your laundry/housekeeping?: No  Manage your money, pay your bills and track your expenses?: No  Make your own meals?: No    Do your own shopping?: No    Previous Hospitalizations:   Any hospitalizations or ED visits within the last 12 months?: No      Advance Care Planning:   Living will: Yes    Durable POA for healthcare: Yes    Advanced directive: Yes      Cognitive Screening:   Provider or family/friend/caregiver concerned regarding cognition?: Yes    PREVENTIVE SCREENINGS      Cardiovascular Screening:    General: Screening Not Indicated and History Lipid Disorder    Due for: Lipid Panel      Diabetes Screening:     General: Screening Current      Prostate Cancer Screening:    General: Screening Not Indicated      Osteoporosis Screening:      Due for: DXA Axial      Abdominal Aortic Aneurysm (AAA) Screening:        General: Screening Not Indicated      Lung Cancer Screening:     General: Screening Not Indicated      Hepatitis C Screening:    General: Screening Current    Screening, Brief Intervention, and Referral to Treatment (SBIRT)    Screening  Typical number of drinks in a day: 0  Typical number of drinks in a week: 0  Interpretation: Low risk drinking behavior. AUDIT-C Screenin) How often did you have a drink containing alcohol in the past year? 2 to 4 times a month  2) How many drinks did you have on a typical day when you were drinking in the past year? 0  3) How often did you have 6 or more drinks on one occasion in the past year? never    AUDIT-C Score: 2  Interpretation: Score 0-3 (male): Negative screen for alcohol misuse    Single Item Drug Screening:  How often have you used an illegal drug (including marijuana) or a prescription medication for non-medical reasons in the past year? never    Single Item Drug Screen Score: 0  Interpretation: Negative screen for possible drug use disorder    No results found.      Physical Exam:     /74   Pulse 59   Temp 97.6 °F (36.4 °C)   Resp 18   Ht 5' 7" (1.702 m)   Wt 66.7 kg (147 lb)   SpO2 99% BMI 23.02 kg/m²     Physical Exam     Kelley Taylor MD

## 2024-01-02 ENCOUNTER — TELEPHONE (OUTPATIENT)
Dept: FAMILY MEDICINE CLINIC | Facility: CLINIC | Age: 84
End: 2024-01-02

## 2024-01-02 DIAGNOSIS — F02.80 ALZHEIMER DISEASE (HCC): ICD-10-CM

## 2024-01-02 DIAGNOSIS — G30.9 ALZHEIMER DISEASE (HCC): ICD-10-CM

## 2024-01-02 DIAGNOSIS — F09 MILD COGNITIVE DISORDER: ICD-10-CM

## 2024-01-02 RX ORDER — MEMANTINE HYDROCHLORIDE 5 MG/1
TABLET ORAL
Qty: 180 TABLET | Refills: 1 | Status: SHIPPED | OUTPATIENT
Start: 2024-01-02

## 2024-01-02 RX ORDER — DONEPEZIL HYDROCHLORIDE 10 MG/1
TABLET, FILM COATED ORAL
Qty: 90 TABLET | Refills: 1 | Status: SHIPPED | OUTPATIENT
Start: 2024-01-02

## 2024-01-02 NOTE — TELEPHONE ENCOUNTER
Hi, this is Kimmy Peterson calling. I'm actually calling to have a prescription refilled for my , Yung Concecpion. He's a patient of Doctor Smallwood and she had changed his. I cannot pronounce it, so I'm gonna spell it MEMANTINE HCL. I. The prescription I have is for 5 milligram tablets. She asked him to start taking two of the five milligrams in the morning and one in the evening, but my script for this was actually for one in the morning and one in the evening. Therefore, I have run out of these pills and I need a new script for 2 pills in the morning and one pill in the evening. My drug drug store information is [facility] over on [address] and [place]. Any questions, please call me back [phone]. Thank you very much. arti Dumont.

## 2024-02-09 DIAGNOSIS — N40.1 BENIGN LOCALIZED HYPERPLASIA OF PROSTATE WITH URINARY OBSTRUCTION AND LOWER URINARY TRACT SYMPTOMS: ICD-10-CM

## 2024-02-09 DIAGNOSIS — N13.9 BENIGN LOCALIZED HYPERPLASIA OF PROSTATE WITH URINARY OBSTRUCTION AND LOWER URINARY TRACT SYMPTOMS: ICD-10-CM

## 2024-02-09 RX ORDER — FINASTERIDE 5 MG/1
5 TABLET, FILM COATED ORAL DAILY
Qty: 90 TABLET | Refills: 3 | Status: SHIPPED | OUTPATIENT
Start: 2024-02-09

## 2024-03-01 DIAGNOSIS — N18.30 BENIGN HYPERTENSION WITH CHRONIC KIDNEY DISEASE, STAGE III (HCC): ICD-10-CM

## 2024-03-01 DIAGNOSIS — I12.9 BENIGN HYPERTENSION WITH CHRONIC KIDNEY DISEASE, STAGE III (HCC): ICD-10-CM

## 2024-03-01 RX ORDER — LOSARTAN POTASSIUM 50 MG/1
TABLET ORAL
Qty: 90 TABLET | Refills: 1 | Status: SHIPPED | OUTPATIENT
Start: 2024-03-01

## 2024-03-15 ENCOUNTER — RA CDI HCC (OUTPATIENT)
Dept: OTHER | Facility: HOSPITAL | Age: 84
End: 2024-03-15

## 2024-03-20 ENCOUNTER — VBI (OUTPATIENT)
Dept: ADMINISTRATIVE | Facility: OTHER | Age: 84
End: 2024-03-20

## 2024-03-20 NOTE — TELEPHONE ENCOUNTER
03/20/24 1:29 PM    Patient contacted (spoke with patient) to bring Advance Directive, POLST, or Living Will document to next scheduled pcp visit.    Thank you.  Yara Landeros [facility]

## 2024-03-26 ENCOUNTER — LAB (OUTPATIENT)
Dept: LAB | Age: 84
End: 2024-03-26
Payer: COMMERCIAL

## 2024-03-26 DIAGNOSIS — I12.9 BENIGN HYPERTENSION WITH CHRONIC KIDNEY DISEASE, STAGE III (HCC): ICD-10-CM

## 2024-03-26 DIAGNOSIS — N25.81 SECONDARY HYPERPARATHYROIDISM OF RENAL ORIGIN (HCC): ICD-10-CM

## 2024-03-26 DIAGNOSIS — R80.1 PERSISTENT PROTEINURIA: ICD-10-CM

## 2024-03-26 DIAGNOSIS — N13.9 BENIGN LOCALIZED HYPERPLASIA OF PROSTATE WITH URINARY OBSTRUCTION AND LOWER URINARY TRACT SYMPTOMS: ICD-10-CM

## 2024-03-26 DIAGNOSIS — N18.32 STAGE 3B CHRONIC KIDNEY DISEASE (HCC): ICD-10-CM

## 2024-03-26 DIAGNOSIS — N28.1 BILATERAL RENAL CYSTS: ICD-10-CM

## 2024-03-26 DIAGNOSIS — N18.30 BENIGN HYPERTENSION WITH CHRONIC KIDNEY DISEASE, STAGE III (HCC): ICD-10-CM

## 2024-03-26 DIAGNOSIS — N40.1 BENIGN LOCALIZED HYPERPLASIA OF PROSTATE WITH URINARY OBSTRUCTION AND LOWER URINARY TRACT SYMPTOMS: ICD-10-CM

## 2024-03-26 LAB
25(OH)D3 SERPL-MCNC: 45.4 NG/ML (ref 30–100)
ANION GAP SERPL CALCULATED.3IONS-SCNC: 11 MMOL/L (ref 4–13)
BACTERIA UR QL AUTO: ABNORMAL /HPF
BILIRUB UR QL STRIP: NEGATIVE
BUN SERPL-MCNC: 20 MG/DL (ref 5–25)
CALCIUM SERPL-MCNC: 9 MG/DL (ref 8.4–10.2)
CHLORIDE SERPL-SCNC: 106 MMOL/L (ref 96–108)
CLARITY UR: CLEAR
CO2 SERPL-SCNC: 27 MMOL/L (ref 21–32)
COLOR UR: YELLOW
CREAT SERPL-MCNC: 1.52 MG/DL (ref 0.6–1.3)
CREAT UR-MCNC: 176.2 MG/DL
GFR SERPL CREATININE-BSD FRML MDRD: 41 ML/MIN/1.73SQ M
GLUCOSE P FAST SERPL-MCNC: 93 MG/DL (ref 65–99)
GLUCOSE UR STRIP-MCNC: NEGATIVE MG/DL
HGB UR QL STRIP.AUTO: NEGATIVE
HYALINE CASTS #/AREA URNS LPF: ABNORMAL /LPF
KETONES UR STRIP-MCNC: NEGATIVE MG/DL
LEUKOCYTE ESTERASE UR QL STRIP: NEGATIVE
MAGNESIUM SERPL-MCNC: 2.1 MG/DL (ref 1.9–2.7)
MUCOUS THREADS UR QL AUTO: ABNORMAL
NITRITE UR QL STRIP: NEGATIVE
NON-SQ EPI CELLS URNS QL MICRO: ABNORMAL /HPF
PH UR STRIP.AUTO: 5.5 [PH]
PHOSPHATE SERPL-MCNC: 3.8 MG/DL (ref 2.3–4.1)
POTASSIUM SERPL-SCNC: 4.3 MMOL/L (ref 3.5–5.3)
PROT UR STRIP-MCNC: ABNORMAL MG/DL
PROT UR-MCNC: 62 MG/DL
PROT/CREAT UR: 0.35 MG/G{CREAT} (ref 0–0.1)
PTH-INTACT SERPL-MCNC: 94.6 PG/ML (ref 12–88)
RBC #/AREA URNS AUTO: ABNORMAL /HPF
SODIUM SERPL-SCNC: 144 MMOL/L (ref 135–147)
SP GR UR STRIP.AUTO: 1.02 (ref 1–1.03)
UROBILINOGEN UR STRIP-ACNC: <2 MG/DL
WBC #/AREA URNS AUTO: ABNORMAL /HPF

## 2024-03-26 PROCEDURE — 83735 ASSAY OF MAGNESIUM: CPT

## 2024-03-26 PROCEDURE — 83970 ASSAY OF PARATHORMONE: CPT

## 2024-03-26 PROCEDURE — 82570 ASSAY OF URINE CREATININE: CPT

## 2024-03-26 PROCEDURE — 84100 ASSAY OF PHOSPHORUS: CPT

## 2024-03-26 PROCEDURE — 81001 URINALYSIS AUTO W/SCOPE: CPT

## 2024-03-26 PROCEDURE — 82306 VITAMIN D 25 HYDROXY: CPT

## 2024-03-26 PROCEDURE — 36415 COLL VENOUS BLD VENIPUNCTURE: CPT

## 2024-03-26 PROCEDURE — 84156 ASSAY OF PROTEIN URINE: CPT

## 2024-03-26 PROCEDURE — 80048 BASIC METABOLIC PNL TOTAL CA: CPT

## 2024-03-27 ENCOUNTER — OFFICE VISIT (OUTPATIENT)
Dept: FAMILY MEDICINE CLINIC | Facility: CLINIC | Age: 84
End: 2024-03-27
Payer: COMMERCIAL

## 2024-03-27 VITALS
WEIGHT: 151.2 LBS | DIASTOLIC BLOOD PRESSURE: 96 MMHG | RESPIRATION RATE: 18 BRPM | BODY MASS INDEX: 23.73 KG/M2 | TEMPERATURE: 96.6 F | SYSTOLIC BLOOD PRESSURE: 136 MMHG | OXYGEN SATURATION: 98 % | HEIGHT: 67 IN | HEART RATE: 70 BPM

## 2024-03-27 DIAGNOSIS — Z13.29 THYROID DISORDER SCREENING: ICD-10-CM

## 2024-03-27 DIAGNOSIS — D69.6 PLATELETS DECREASED (HCC): ICD-10-CM

## 2024-03-27 DIAGNOSIS — F02.80 ALZHEIMER DISEASE (HCC): ICD-10-CM

## 2024-03-27 DIAGNOSIS — N25.81 SECONDARY HYPERPARATHYROIDISM OF RENAL ORIGIN (HCC): ICD-10-CM

## 2024-03-27 DIAGNOSIS — Z12.5 PROSTATE CANCER SCREENING: ICD-10-CM

## 2024-03-27 DIAGNOSIS — G30.9 ALZHEIMER DISEASE (HCC): ICD-10-CM

## 2024-03-27 DIAGNOSIS — N18.32 STAGE 3B CHRONIC KIDNEY DISEASE (HCC): ICD-10-CM

## 2024-03-27 DIAGNOSIS — E78.5 HYPERLIPIDEMIA, UNSPECIFIED HYPERLIPIDEMIA TYPE: ICD-10-CM

## 2024-03-27 DIAGNOSIS — I10 ESSENTIAL HYPERTENSION: Primary | ICD-10-CM

## 2024-03-27 PROCEDURE — G2211 COMPLEX E/M VISIT ADD ON: HCPCS | Performed by: INTERNAL MEDICINE

## 2024-03-27 PROCEDURE — 99214 OFFICE O/P EST MOD 30 MIN: CPT | Performed by: INTERNAL MEDICINE

## 2024-03-27 RX ORDER — MEMANTINE HYDROCHLORIDE 10 MG/1
10 TABLET ORAL 2 TIMES DAILY
Qty: 60 TABLET | Refills: 3 | Status: SHIPPED | OUTPATIENT
Start: 2024-03-27

## 2024-03-27 NOTE — RESULT ENCOUNTER NOTE
Please inform patient that renal function is stable at creatinine 1.52 mg/dL, urine test with finding of proteinuria.  Potassium at normal range.  Please arrange for follow-up appointment with me or an advanced practitioner.  Thank you

## 2024-03-27 NOTE — ASSESSMENT & PLAN NOTE
Lab Results   Component Value Date    EGFR 41 03/26/2024    EGFR 43 10/30/2023    EGFR 43 05/05/2023    CREATININE 1.52 (H) 03/26/2024    CREATININE 1.45 (H) 10/30/2023    CREATININE 1.46 (H) 05/05/2023   Stable creatinine

## 2024-03-27 NOTE — PROGRESS NOTES
Assessment/Plan:    Stage 3b chronic kidney disease (HCC)  Lab Results   Component Value Date    EGFR 41 03/26/2024    EGFR 43 10/30/2023    EGFR 43 05/05/2023    CREATININE 1.52 (H) 03/26/2024    CREATININE 1.45 (H) 10/30/2023    CREATININE 1.46 (H) 05/05/2023   Stable creatinine    Platelets decreased (HCC)  Repeat CBC    Secondary hyperparathyroidism of renal origin (HCC)  Patient is following nephrology    Alzheimer disease (HCC)  Overall stable tolerating Aricept 10 and Namenda 15 mg.  Will increase Namenda to 20 mg.  I will be seeing him back in 4 months       Problem List Items Addressed This Visit       Essential hypertension - Primary    Relevant Orders    CBC and differential    Comprehensive metabolic panel    Hyperlipidemia    Relevant Orders    Comprehensive metabolic panel    Lipid panel    Stage 3b chronic kidney disease (HCC)     Lab Results   Component Value Date    EGFR 41 03/26/2024    EGFR 43 10/30/2023    EGFR 43 05/05/2023    CREATININE 1.52 (H) 03/26/2024    CREATININE 1.45 (H) 10/30/2023    CREATININE 1.46 (H) 05/05/2023   Stable creatinine         Secondary hyperparathyroidism of renal origin (HCC)     Patient is following nephrology         Platelets decreased (HCC)     Repeat CBC         Alzheimer disease (HCC)     Overall stable tolerating Aricept 10 and Namenda 15 mg.  Will increase Namenda to 20 mg.  I will be seeing him back in 4 months         Relevant Medications    memantine (NAMENDA) 10 mg tablet     Other Visit Diagnoses       Thyroid disorder screening        Relevant Orders    TSH, 3rd generation with Free T4 reflex    Prostate cancer screening        Relevant Orders    PSA, Total Screen              Subjective:      Patient ID: Yung Concepcion is a 83 y.o. male.    HPI  Patient with Alzheimer's  hypertension hyperlipidemia is here to follow-up with chronic medical problems.  He is on Namenda 15 mg underwent along with Aricept 10 mg a day and his wife accompanies him today  "reports overall stability of his symptoms.  She is frustrated by his lack of motivation.  However, she plans to taking full golfing as the weather changes.  Patient was able to share about his working years experience when asked about his life.  We also discussed starting a [facility] class at [facility].  I would increase his Namenda to 20 mg.  His wife keep me posted.  Patient is followed regular with his nephrologist.  Creatinine is stable he is euvolemic.  Blood pressure is good.  He has gained some weight since last visit as he is taking Ensure.  The following portions of the patient's history were reviewed and updated as appropriate: allergies, current medications, past family history, past medical history, past social history, past surgical history, and problem list.    Review of Systems      Objective:      /96 (BP Location: Left arm, Patient Position: Sitting, Cuff Size: Standard)   Pulse 70   Temp (!) 96.6 °F (35.9 °C) (Tympanic)   Resp 18   Ht 5' 7\" (1.702 m)   Wt 68.6 kg (151 lb 3.2 oz)   SpO2 98%   BMI 23.68 kg/m²          Physical Exam  Neurological:      Mental Status: He is alert. Mental status is at baseline.      Cranial Nerves: No cranial nerve deficit.      Motor: No weakness.      Coordination: Coordination normal.      Comments: Is able to answer simple questions.                 "

## 2024-03-27 NOTE — ASSESSMENT & PLAN NOTE
Overall stable tolerating Aricept 10 and Namenda 15 mg.  Will increase Namenda to 20 mg.  I will be seeing him back in 4 months

## 2024-03-28 ENCOUNTER — TELEPHONE (OUTPATIENT)
Dept: NEPHROLOGY | Facility: CLINIC | Age: 84
End: 2024-03-28

## 2024-03-28 NOTE — TELEPHONE ENCOUNTER
I discussed labs with patients wife she is aware of patients results. No questions or concerns at this time.     Pt scheduled.

## 2024-03-28 NOTE — TELEPHONE ENCOUNTER
----- Message from Cliff Fritz MD sent at 3/27/2024  2:55 PM EDT -----  Please inform patient that renal function is stable at creatinine 1.52 mg/dL, urine test with finding of proteinuria.  Potassium at normal range.  Please arrange for follow-up appointment with me or an advanced practitioner.  Thank you

## 2024-04-19 DIAGNOSIS — G30.9 ALZHEIMER DISEASE (HCC): ICD-10-CM

## 2024-04-19 DIAGNOSIS — F02.80 ALZHEIMER DISEASE (HCC): ICD-10-CM

## 2024-04-19 RX ORDER — MEMANTINE HYDROCHLORIDE 10 MG/1
10 TABLET ORAL 2 TIMES DAILY
Qty: 180 TABLET | Refills: 1 | Status: SHIPPED | OUTPATIENT
Start: 2024-04-19

## 2024-05-29 ENCOUNTER — RA CDI HCC (OUTPATIENT)
Dept: OTHER | Facility: HOSPITAL | Age: 84
End: 2024-05-29

## 2024-05-31 ENCOUNTER — TELEPHONE (OUTPATIENT)
Dept: NEPHROLOGY | Facility: HOSPITAL | Age: 84
End: 2024-05-31

## 2024-05-31 ENCOUNTER — APPOINTMENT (OUTPATIENT)
Dept: LAB | Age: 84
End: 2024-05-31
Payer: COMMERCIAL

## 2024-05-31 DIAGNOSIS — N18.32 STAGE 3B CHRONIC KIDNEY DISEASE (HCC): ICD-10-CM

## 2024-05-31 DIAGNOSIS — R80.1 PERSISTENT PROTEINURIA: ICD-10-CM

## 2024-05-31 DIAGNOSIS — N18.32 STAGE 3B CHRONIC KIDNEY DISEASE (HCC): Primary | ICD-10-CM

## 2024-05-31 LAB
ALBUMIN SERPL BCP-MCNC: 3.9 G/DL (ref 3.5–5)
ALP SERPL-CCNC: 69 U/L (ref 34–104)
ALT SERPL W P-5'-P-CCNC: 21 U/L (ref 7–52)
ANION GAP SERPL CALCULATED.3IONS-SCNC: 10 MMOL/L (ref 4–13)
AST SERPL W P-5'-P-CCNC: 24 U/L (ref 13–39)
BASOPHILS # BLD AUTO: 0.06 THOUSANDS/ÂΜL (ref 0–0.1)
BASOPHILS NFR BLD AUTO: 1 % (ref 0–1)
BILIRUB SERPL-MCNC: 0.63 MG/DL (ref 0.2–1)
BUN SERPL-MCNC: 25 MG/DL (ref 5–25)
CALCIUM SERPL-MCNC: 9.3 MG/DL (ref 8.4–10.2)
CHLORIDE SERPL-SCNC: 105 MMOL/L (ref 96–108)
CHOLEST SERPL-MCNC: 177 MG/DL
CO2 SERPL-SCNC: 27 MMOL/L (ref 21–32)
CREAT SERPL-MCNC: 1.46 MG/DL (ref 0.6–1.3)
EOSINOPHIL # BLD AUTO: 0.27 THOUSAND/ÂΜL (ref 0–0.61)
EOSINOPHIL NFR BLD AUTO: 4 % (ref 0–6)
ERYTHROCYTE [DISTWIDTH] IN BLOOD BY AUTOMATED COUNT: 15 % (ref 11.6–15.1)
GFR SERPL CREATININE-BSD FRML MDRD: 43 ML/MIN/1.73SQ M
GLUCOSE P FAST SERPL-MCNC: 84 MG/DL (ref 65–99)
HCT VFR BLD AUTO: 45 % (ref 36.5–49.3)
HDLC SERPL-MCNC: 39 MG/DL
HGB BLD-MCNC: 15.1 G/DL (ref 12–17)
IMM GRANULOCYTES # BLD AUTO: 0.05 THOUSAND/UL (ref 0–0.2)
IMM GRANULOCYTES NFR BLD AUTO: 1 % (ref 0–2)
LDLC SERPL CALC-MCNC: 102 MG/DL (ref 0–100)
LYMPHOCYTES # BLD AUTO: 2.12 THOUSANDS/ÂΜL (ref 0.6–4.47)
LYMPHOCYTES NFR BLD AUTO: 28 % (ref 14–44)
MCH RBC QN AUTO: 32.1 PG (ref 26.8–34.3)
MCHC RBC AUTO-ENTMCNC: 33.6 G/DL (ref 31.4–37.4)
MCV RBC AUTO: 96 FL (ref 82–98)
MONOCYTES # BLD AUTO: 0.92 THOUSAND/ÂΜL (ref 0.17–1.22)
MONOCYTES NFR BLD AUTO: 12 % (ref 4–12)
NEUTROPHILS # BLD AUTO: 4.27 THOUSANDS/ÂΜL (ref 1.85–7.62)
NEUTS SEG NFR BLD AUTO: 54 % (ref 43–75)
NONHDLC SERPL-MCNC: 138 MG/DL
NRBC BLD AUTO-RTO: 0 /100 WBCS
PLATELET # BLD AUTO: 171 THOUSANDS/UL (ref 149–390)
PMV BLD AUTO: 11.5 FL (ref 8.9–12.7)
POTASSIUM SERPL-SCNC: 4.4 MMOL/L (ref 3.5–5.3)
PROT SERPL-MCNC: 6.8 G/DL (ref 6.4–8.4)
PSA SERPL-MCNC: 2.11 NG/ML (ref 0–4)
RBC # BLD AUTO: 4.7 MILLION/UL (ref 3.88–5.62)
SODIUM SERPL-SCNC: 142 MMOL/L (ref 135–147)
TRIGL SERPL-MCNC: 180 MG/DL
TSH SERPL DL<=0.05 MIU/L-ACNC: 3.14 UIU/ML (ref 0.45–4.5)
WBC # BLD AUTO: 7.69 THOUSAND/UL (ref 4.31–10.16)

## 2024-05-31 NOTE — TELEPHONE ENCOUNTER
----- Message from Cliff Fritz MD sent at 5/30/2024  4:16 PM EDT -----  Can you just order a BMP to be done before the office visit  ----- Message -----  From: Sharmin Eagle  Sent: 5/30/2024   3:48 PM EDT  To: Cliff Fritz MD    Scheduled 6/6 no active labs in epic can you please advise on labs.

## 2024-06-05 ENCOUNTER — TELEPHONE (OUTPATIENT)
Dept: ADMINISTRATIVE | Facility: OTHER | Age: 84
End: 2024-06-05

## 2024-06-05 NOTE — TELEPHONE ENCOUNTER
06/05/24 2:55 PM    Patient contacted to bring Advance Directive, POLST, or Living Will document to next scheduled pcp visit.[facility] Department spoke with patient wife. .    Thank you.  Matteo Moffett MA  [facility]

## 2024-06-06 ENCOUNTER — OFFICE VISIT (OUTPATIENT)
Dept: NEPHROLOGY | Facility: CLINIC | Age: 84
End: 2024-06-06
Payer: COMMERCIAL

## 2024-06-06 VITALS
DIASTOLIC BLOOD PRESSURE: 80 MMHG | HEIGHT: 67 IN | WEIGHT: 145 LBS | BODY MASS INDEX: 22.76 KG/M2 | SYSTOLIC BLOOD PRESSURE: 132 MMHG

## 2024-06-06 DIAGNOSIS — I12.9 BENIGN HYPERTENSION WITH CHRONIC KIDNEY DISEASE, STAGE III (HCC): ICD-10-CM

## 2024-06-06 DIAGNOSIS — N18.32 STAGE 3B CHRONIC KIDNEY DISEASE (HCC): Primary | ICD-10-CM

## 2024-06-06 DIAGNOSIS — N18.30 BENIGN HYPERTENSION WITH CHRONIC KIDNEY DISEASE, STAGE III (HCC): ICD-10-CM

## 2024-06-06 DIAGNOSIS — N40.1 BENIGN LOCALIZED HYPERPLASIA OF PROSTATE WITH URINARY OBSTRUCTION AND LOWER URINARY TRACT SYMPTOMS: ICD-10-CM

## 2024-06-06 DIAGNOSIS — N13.9 BENIGN LOCALIZED HYPERPLASIA OF PROSTATE WITH URINARY OBSTRUCTION AND LOWER URINARY TRACT SYMPTOMS: ICD-10-CM

## 2024-06-06 DIAGNOSIS — N28.1 BILATERAL RENAL CYSTS: ICD-10-CM

## 2024-06-06 DIAGNOSIS — N25.81 SECONDARY HYPERPARATHYROIDISM OF RENAL ORIGIN (HCC): ICD-10-CM

## 2024-06-06 DIAGNOSIS — R80.1 PERSISTENT PROTEINURIA: ICD-10-CM

## 2024-06-06 PROCEDURE — 99214 OFFICE O/P EST MOD 30 MIN: CPT | Performed by: INTERNAL MEDICINE

## 2024-06-06 PROCEDURE — G2211 COMPLEX E/M VISIT ADD ON: HCPCS | Performed by: INTERNAL MEDICINE

## 2024-06-06 NOTE — PROGRESS NOTES
NEPHROLOGY OUTPATIENT PROGRESS NOTE   Yung Concepcion 83 y.o. 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  - SPEP from December 2020 was negative for monoclonal protein, UPEP was negative for monoclonal protein and kappa lambda ratio was 1.19.  -Plan:   Renal function is stable, last blood work showed creatinine 1.46 mg/dL.  Electrolytes are stable.  Continue to monitor renal function continue to avoid nephrotoxins continue oral hydration.     LDL at goal, on Crestor last LDL was 102  Follow-up in 6 months with repeat labs  Kidney education class - completed in jan 2022     Primary Hypertension with CKD stage 3 :   -Current medication: losartan 50 mg daily .    -Previously was on amlodipine and HCTZ which was stopped during initial office visit with me and was started on losartan 50 mg daily.  -Current blood pressure: BP stable and is at goal  -Plan:    Continue current treatment with losartan 50 mg daily  -Recommend 2 g sodium diet.    -Recommend daily exercise and weight loss  -Discussed home monitoring of BP and maintaining a log of blood pressure.  -Recommend goal BP less than 135/80.       Proteinuria, persistent  -has persistent proteinuria with onset since 2017   -proteinuria slightly worsened on last urine test to UPC ratio 0.35 in March 2024.  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    -May consider use of SGLT2 inhibitor in future if proteinuria worsens  -continue losartan 50 mg daily  -Continue to monitor proteinuria    Secondary hyperparathyroidism of renal origin  -PTH  level from March 26, 2024 was 94.6, PTH level acceptable, continue to monitor no need for calcitriol.  Last vitamin D level at normal range at 45.4  -Monitor PTH level     Bilateral renal cyst  - renal ultrasound in December 2020 suggestive of right kidney with simple appearing upper pole cyst measuring 3 x 2.5 x 2.6 cm and simple cortical cyst measuring 2.1 x 2.5 cm in size, and other simple cyst measuring 2.1 x 1.5 cm in size and finding of mild parapelvic cyst measuring 1.9 cm in size   left kidney showed upper pole cyst measuring 5.9 x 5.3 cm and a contiguous cyst measuring 5.1 x 4 cm in size with finding of flow within the septations  -renal ultrasound from September 2021 suggestive of right kidney with simple cortical and parapelvic cyst measuring 2.9 cm in size.  Left kidney with 2 adjacent cyst versus single cyst with thin septation.  One of the cyst measuring 5.4 x 6.4 cm in size and other 1 measuring 4.7 x 5.6 cm in size.  -she underwent CT abdomen pelvis with contrast in December 2021 for suspected inguinal hernia, CT scan results reviewed, finding of bilateral simple cyst largest measuring 6 cm on the left and 3 cm on the right  -No need for further monitoring as CT abdomen suggestive of simple cyst and stable cyst  -Patient was also seen by urology.      BPH with lower urinary tract symptoms  -renal ultrasound suggestive of enlarged Prostate  - Continue Proscar/finasteride.  -Reviewed last urology note from jan 2023- recommend follow up with urology           Patient Instructions   -Renal Function is stable   -You have Chronic Kidney Disease Stage 3   -Avoid NSAIDs like Ibuprofen/Motrin, Naproxen/Aleve, Celebrex, meloxicam/Mobic, Diclofenac and other NSAIDs.  -Okay to take Acetaminophen/Tylenol if you do not have any liver problems  -Avoid IV contrast used for CT scan and cardiac catheterization.    -If plan for any study with IV contrast, please let me know so we could hydrate with fluids before and  after IV contrast  -Dosage  of certain medications may need to be adjusted depending on Kidney function.     Blood pressure is stable    -Recommend 2 g sodium diet.    -Recommend daily exercise and weight loss  -Discussed home monitoring of BP and maintaining a log of blood pressure.  -Recommend goal BP less than 130/80. Please inform me if SBP below 110 or above 140's persistently.    Follow up: 6  months with repeat Lab work within a week of the scheduled office visit. Will discuss the results of the previsit Labs during the office visit.         Diagnoses and all orders for this visit:    Stage 3b chronic kidney disease (HCC)  -     Basic metabolic panel; Future  -     Phosphorus; Future  -     Protein / creatinine ratio, urine; Future  -     PTH, intact; Future  -     Urinalysis with microscopic; Future    Benign hypertension with chronic kidney disease, stage III ([facility])  -     Basic metabolic panel; Future    Persistent proteinuria  -     Protein / creatinine ratio, urine; Future  -     Urinalysis with microscopic; Future    Secondary hyperparathyroidism of renal origin ([facility])  -     PTH, intact; Future    Bilateral renal cysts    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  -     Basic metabolic panel; Future  -     Urinalysis with microscopic; Future              SUBJECTIVE / HPI:  Yung Concepcion is a 83 y.o.  male with medical issues of  Hypertension x , coronary artery disease, Raynaud's syndrome, BPH, hyperlipidemia who presents for initial consultation for  Chronic kidney disease stage 3.     Patient has elevated creatinine 1.3-1.5 since 2016 when it was 1.32.  In the year 2020 creatinine was around 1.47.  Last blood work from December 30, 2020 showed creatinine 1.45.  Most likely baseline creatinine is around 1.4-1.5   SPEP UPEP light chain ratio does not suggest paraproteinemia, K/L ratio 1.19     During initial office visit with me, amlodipine was  stopped, HCTZ was stopped and patient was started on losartan 50 mg daily to help with proteinuria and since then blood pressure has been well controlled, does not have any lower extremity edema.      Reviewed labs from May 21, 2024: Renal function stable at creatinine 1.46 mg/dL with stable electrolytes  EGFR of 43.  Hemoglobin 15.1 g/dL.  TSH at normal range.  Last PTH from March was 94.6 and UPC ratio was 135    No new complaints.  No chest pain or shortness of breath, no nausea vomiting  -reviewed PCP note from march 2024        REVIEW OF SYSTEMS:    Review of Systems   Constitutional:  Negative for activity change, appetite change, chills, diaphoresis, fatigue and fever.   HENT:  Negative for congestion, facial swelling and nosebleeds.    Eyes:  Negative for pain and visual disturbance.   Respiratory:  Negative for cough, chest tightness and shortness of breath.    Cardiovascular:  Negative for chest pain and palpitations.   Gastrointestinal:  Negative for abdominal distention, abdominal pain, diarrhea, nausea and vomiting.   Genitourinary:  Negative for difficulty urinating, dysuria, flank pain, frequency and hematuria.   Musculoskeletal:  Negative for arthralgias, back pain and joint swelling.   Skin:  Negative for rash.   Neurological:  Negative for dizziness, seizures, syncope, weakness and headaches.   Psychiatric/Behavioral:  Negative for agitation and confusion. The patient is not nervous/anxious.        More than 10 point review of systems were obtained and discussed in length with the patient. Complete review of systems were negative / unremarkable except mentioned above.       PAST MEDICAL HISTORY:  Past Medical History:   Diagnosis Date    Basal cell carcinoma     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     Coronary artery disease     CAD s/p stent 1995    Elevated PSA     R....11/16/17    Hypertension     Wears glasses        PAST SURGICAL HISTORY:  Past Surgical History:    Procedure Laterality Date    COLONOSCOPY      INGUINAL HERNIA REPAIR      MOHS SURGERY      SINUS SURGERY Right 09/29/2022    SKIN BIOPSY      basal cell     TONSILLECTOMY         SOCIAL HISTORY:  Social History     Substance and Sexual Activity   Alcohol Use No     Social History     Substance and Sexual Activity   Drug Use No     Social History     Tobacco Use   Smoking Status Never   Smokeless Tobacco Never       FAMILY HISTORY:  Family History   Problem Relation Age of Onset    COPD Mother     Heart failure Mother     Heart attack Father     Lung cancer Brother     No Known Problems Son     No Known Problems Daughter     No Known Problems Daughter        MEDICATIONS:    Current Outpatient Medications:     aspirin (ECOTRIN LOW STRENGTH) 81 mg EC tablet, Take 81 mg by mouth, Disp: , Rfl:     b complex vitamins tablet, Take 1 tablet by mouth daily, Disp: , Rfl:     Biotin w/ Vitamins C & E 1250-7.5-7.5 MCG-MG-UNT CHEW, Chew, Disp: , Rfl:     Coenzyme Q10 (CO Q10) 100 MG CAPS, Take by mouth, Disp: , Rfl:     donepezil (ARICEPT) 10 mg tablet, TAKE 1 TABLET BY MOUTH EVERYDAY AT BEDTIME, Disp: 90 tablet, Rfl: 1    finasteride (PROSCAR) 5 mg tablet, TAKE 1 TABLET (5 MG TOTAL) BY MOUTH DAILY., Disp: 90 tablet, Rfl: 3    losartan (COZAAR) 50 mg tablet, TAKE 1 TABLET BY MOUTH EVERY DAY, Disp: 90 tablet, Rfl: 1    memantine (NAMENDA) 10 mg tablet, TAKE 1 TABLET (10 MG TOTAL) BY MOUTH 2 (TWO) TIMES A DAY 2 PILLS IN AM AND 1 IN PM, Disp: 180 tablet, Rfl: 1    Multiple Vitamins-Minerals (MULTI-VITAMIN/MINERALS PO), Take by mouth, Disp: , Rfl:     rosuvastatin (CRESTOR) 10 MG tablet, TAKE 1 TABLET BY MOUTH EVERY DAY AT NIGHT, Disp: , Rfl:     zinc gluconate 50 mg tablet, Take 50 mg by mouth daily, Disp: , Rfl:     Azelastine HCl 137 MCG/SPRAY SOLN, SPRAY 1 SPRAY INTO EACH NOSTRIL 2 (TWO) TIMES A DAY. USE IN EACH NOSTRIL AS DIRECTED (Patient not taking: Reported on 3/27/2024), Disp: 90 mL, Rfl: 4    ipratropium (ATROVENT) 0.06  "% nasal spray, 2 sprays into each nostril 3 (three) times a day as needed for rhinitis (Patient not taking: Reported on 6/21/2022), Disp: 15 mL, Rfl: 3      PHYSICAL EXAM:  Vitals:    06/06/24 1455   BP: 132/80   BP Location: Left arm   Patient Position: Sitting   Cuff Size: Adult   Weight: 65.8 kg (145 lb)   Height: 5' 7\" (1.702 m)     Body mass index is 22.71 kg/m².    Physical Exam  Constitutional:       General: He is not in acute distress.     Appearance: He is well-developed. He is not diaphoretic.   HENT:      Head: Normocephalic and atraumatic.      Mouth/Throat:      Mouth: Mucous membranes are moist.   Eyes:      General: No scleral icterus.     Conjunctiva/sclera: Conjunctivae normal.      Pupils: Pupils are equal, round, and reactive to light.   Neck:      Thyroid: No thyromegaly.   Cardiovascular:      Rate and Rhythm: Normal rate and regular rhythm.      Heart sounds: Normal heart sounds. No murmur heard.     No friction rub.   Pulmonary:      Effort: Pulmonary effort is normal. No respiratory distress.      Breath sounds: Normal breath sounds. No wheezing or rales.   Abdominal:      General: Bowel sounds are normal. There is no distension.      Palpations: Abdomen is soft.      Tenderness: There is no abdominal tenderness.   Musculoskeletal:         General: No deformity.      Cervical back: Neck supple.      Right lower leg: No edema.      Left lower leg: No edema.   Lymphadenopathy:      Cervical: No cervical adenopathy.   Skin:     Coloration: Skin is not pale.      Nails: There is no clubbing.   Neurological:      Mental Status: He is alert and oriented to person, place, and time. He is not disoriented.   Psychiatric:         Mood and Affect: Mood normal. Mood is not anxious. Affect is not inappropriate.         Behavior: Behavior normal.         Thought Content: Thought content normal.         Lab Results:   Results for orders placed or performed in visit on 03/27/24   CBC and differential "   Result Value Ref Range    WBC 7.69 4.31 - 10.16 Thousand/uL    RBC 4.70 3.88 - 5.62 Million/uL    Hemoglobin 15.1 12.0 - 17.0 g/dL    Hematocrit 45.0 36.5 - 49.3 %    MCV 96 82 - 98 fL    MCH 32.1 26.8 - 34.3 pg    MCHC 33.6 31.4 - 37.4 g/dL    RDW 15.0 11.6 - 15.1 %    MPV 11.5 8.9 - 12.7 fL    Platelets 171 149 - 390 Thousands/uL    nRBC 0 /100 WBCs    Segmented % 54 43 - 75 %    Immature Grans % 1 0 - 2 %    Lymphocytes % 28 14 - 44 %    Monocytes % 12 4 - 12 %    Eosinophils Relative 4 0 - 6 %    Basophils Relative 1 0 - 1 %    Absolute Neutrophils 4.27 1.85 - 7.62 Thousands/µL    Absolute Immature Grans 0.05 0.00 - 0.20 Thousand/uL    Absolute Lymphocytes 2.12 0.60 - 4.47 Thousands/µL    Absolute Monocytes 0.92 0.17 - 1.22 Thousand/µL    Eosinophils Absolute 0.27 0.00 - 0.61 Thousand/µL    Basophils Absolute 0.06 0.00 - 0.10 Thousands/µL   Comprehensive metabolic panel   Result Value Ref Range    Sodium 142 135 - 147 mmol/L    Potassium 4.4 3.5 - 5.3 mmol/L    Chloride 105 96 - 108 mmol/L    CO2 27 21 - 32 mmol/L    ANION GAP 10 4 - 13 mmol/L    BUN 25 5 - 25 mg/dL    Creatinine 1.46 (H) 0.60 - 1.30 mg/dL    Glucose, Fasting 84 65 - 99 mg/dL    Calcium 9.3 8.4 - 10.2 mg/dL    AST 24 13 - 39 U/L    ALT 21 7 - 52 U/L    Alkaline Phosphatase 69 34 - 104 U/L    Total Protein 6.8 6.4 - 8.4 g/dL    Albumin 3.9 3.5 - 5.0 g/dL    Total Bilirubin 0.63 0.20 - 1.00 mg/dL    eGFR 43 ml/min/1.73sq m   Lipid panel   Result Value Ref Range    Cholesterol 177 See Comment mg/dL    Triglycerides 180 (H) See Comment mg/dL    HDL, Direct 39 (L) >=40 mg/dL    LDL Calculated 102 (H) 0 - 100 mg/dL    Non-HDL-Chol (CHOL-HDL) 138 mg/dl   PSA, Total Screen   Result Value Ref Range    PSA 2.11 0.00 - 4.00 ng/mL   TSH, 3rd generation with Free T4 reflex   Result Value Ref Range    TSH 3RD GENERATON 3.140 0.450 - 4.500 uIU/mL

## 2024-06-06 NOTE — PATIENT INSTRUCTIONS
-Renal Function is stable   -You have Chronic Kidney Disease Stage 3   -Avoid NSAIDs like Ibuprofen/Motrin, Naproxen/Aleve, Celebrex, meloxicam/Mobic, Diclofenac and other NSAIDs.  -Okay to take Acetaminophen/Tylenol if you do not have any liver problems  -Avoid IV contrast used for CT scan and cardiac catheterization.    -If plan for any study with IV contrast, please let me know so we could hydrate with fluids before and after IV contrast  -Dosage  of certain medications may need to be adjusted depending on Kidney function.     Blood pressure is stable    -Recommend 2 g sodium diet.    -Recommend daily exercise and weight loss  -Discussed home monitoring of BP and maintaining a log of blood pressure.  -Recommend goal BP less than 130/80. Please inform me if SBP below 110 or above 140's persistently.    Follow up: 6  months with repeat Lab work within a week of the scheduled office visit. Will discuss the results of the previsit Labs during the office visit.

## 2024-06-07 ENCOUNTER — OFFICE VISIT (OUTPATIENT)
Dept: FAMILY MEDICINE CLINIC | Facility: CLINIC | Age: 84
End: 2024-06-07
Payer: COMMERCIAL

## 2024-06-07 VITALS
WEIGHT: 155 LBS | DIASTOLIC BLOOD PRESSURE: 86 MMHG | HEART RATE: 78 BPM | BODY MASS INDEX: 24.33 KG/M2 | HEIGHT: 67 IN | OXYGEN SATURATION: 98 % | SYSTOLIC BLOOD PRESSURE: 120 MMHG

## 2024-06-07 DIAGNOSIS — G30.9 ALZHEIMER DISEASE (HCC): Primary | ICD-10-CM

## 2024-06-07 DIAGNOSIS — F02.80 ALZHEIMER DISEASE (HCC): Primary | ICD-10-CM

## 2024-06-07 PROCEDURE — G2211 COMPLEX E/M VISIT ADD ON: HCPCS | Performed by: INTERNAL MEDICINE

## 2024-06-07 PROCEDURE — 99213 OFFICE O/P EST LOW 20 MIN: CPT | Performed by: INTERNAL MEDICINE

## 2024-06-07 RX ORDER — MEMANTINE HYDROCHLORIDE 10 MG/1
10 TABLET ORAL 2 TIMES DAILY
Qty: 180 TABLET | Refills: 1 | Status: SHIPPED | OUTPATIENT
Start: 2024-06-07

## 2024-06-17 DIAGNOSIS — F09 MILD COGNITIVE DISORDER: ICD-10-CM

## 2024-06-17 RX ORDER — DONEPEZIL HYDROCHLORIDE 10 MG/1
TABLET, FILM COATED ORAL
Qty: 90 TABLET | Refills: 1 | Status: SHIPPED | OUTPATIENT
Start: 2024-06-17

## 2024-07-12 NOTE — TELEPHONE ENCOUNTER
Wife calling stated she received a leeter from there [address] MRI has been Denied  Anita Hardy would like a call on the matter      Please review  Thank you 12-Jul-2024 17:38

## 2024-08-16 DIAGNOSIS — G30.9 ALZHEIMER DISEASE (HCC): ICD-10-CM

## 2024-08-16 DIAGNOSIS — F02.80 ALZHEIMER DISEASE (HCC): ICD-10-CM

## 2024-08-16 RX ORDER — MEMANTINE HYDROCHLORIDE 10 MG/1
TABLET ORAL
Qty: 180 TABLET | Refills: 1 | Status: SHIPPED | OUTPATIENT
Start: 2024-08-16

## 2024-10-02 ENCOUNTER — TELEPHONE (OUTPATIENT)
Dept: ADMINISTRATIVE | Facility: OTHER | Age: 84
End: 2024-10-02

## 2024-10-02 NOTE — TELEPHONE ENCOUNTER
10/02/24 12:57 PM    Patient contacted to bring Advance Directive, POLST, or Living Will document to next scheduled pcp visit.[facility] Department spoke with patient/ caregiver.    Thank you.  Matteo Moffett MA  [facility]

## 2024-10-10 ENCOUNTER — OFFICE VISIT (OUTPATIENT)
Age: 84
End: 2024-10-10
Payer: COMMERCIAL

## 2024-10-10 VITALS
DIASTOLIC BLOOD PRESSURE: 90 MMHG | OXYGEN SATURATION: 98 % | WEIGHT: 156 LBS | HEIGHT: 66 IN | SYSTOLIC BLOOD PRESSURE: 128 MMHG | HEART RATE: 71 BPM | BODY MASS INDEX: 25.07 KG/M2 | TEMPERATURE: 97.3 F

## 2024-10-10 DIAGNOSIS — Z23 ENCOUNTER FOR IMMUNIZATION: ICD-10-CM

## 2024-10-10 DIAGNOSIS — F02.80 ALZHEIMER DISEASE (HCC): ICD-10-CM

## 2024-10-10 DIAGNOSIS — E78.5 HYPERLIPIDEMIA, UNSPECIFIED HYPERLIPIDEMIA TYPE: ICD-10-CM

## 2024-10-10 DIAGNOSIS — R26.9 NEUROLOGIC GAIT DYSFUNCTION: ICD-10-CM

## 2024-10-10 DIAGNOSIS — G30.9 ALZHEIMER DISEASE (HCC): ICD-10-CM

## 2024-10-10 DIAGNOSIS — I10 ESSENTIAL HYPERTENSION: Primary | ICD-10-CM

## 2024-10-10 PROCEDURE — 99215 OFFICE O/P EST HI 40 MIN: CPT | Performed by: INTERNAL MEDICINE

## 2024-10-10 PROCEDURE — G0008 ADMIN INFLUENZA VIRUS VAC: HCPCS

## 2024-10-10 PROCEDURE — 90662 IIV NO PRSV INCREASED AG IM: CPT

## 2024-10-10 NOTE — PROGRESS NOTES
Assessment/Plan:           Problem List Items Addressed This Visit       Essential hypertension - Primary    Relevant Orders    CBC and differential    Comprehensive metabolic panel    Hyperlipidemia    Relevant Orders    Comprehensive metabolic panel    Lipid Panel with Direct LDL reflex    Alzheimer disease (HCC)    Relevant Orders    Ambulatory Referral to Neurology     Other Visit Diagnoses       Encounter for immunization        Relevant Orders    influenza vaccine, high-dose, PF 0.5 mL (Fluzone High Dose) (Completed)    Neurologic gait dysfunction             some progression since last visit.  Continue current regimen neurology consultation.    Subjective:      Patient ID: Yung Concepcion is a 83 y.o. male.    HPI  Patient with dementia hypertension hyperlipidemia is here with his wife to follow-up on chronic medical problems.  His wife has noticed slightly more decline with his verbal communication more prone to falling asleep during the day despite having a full night sleep.  P.o. intake is good he is not attending weight denies any dysphagia.  Does go out for walks but need to has be limiting his distance.  He does like getting onto his wife's hand.  I walked with him in the office and he does take some steps and like to hold him to walk.  I see this partly out of apprehension of fall.  He has pretty good strength.  I encouraged him to start using a walker.  He does not have tremors.  On occasions he was seeing things that are not there.  No behavioral issues.  He is on Namenda and Aricept.  Neurology consultation requested by family which I would be happy to do so.  Encouraged to also follow-up with ophthalmology to ensure no new vision issues.  The following portions of the patient's history were reviewed and updated as appropriate: allergies, current medications, past medical history, past social history, past surgical history, and problem list.    Review of Systems      Objective:      /90 (BP  "Location: Left arm, Patient Position: Sitting, Cuff Size: Standard)   Pulse 71   Temp (!) 97.3 °F (36.3 °C) (Temporal)   Ht 5' 5.5\" (1.664 m)   Wt 70.8 kg (156 lb)   SpO2 98%   BMI 25.56 kg/m²          Physical Exam  Cardiovascular:      Rate and Rhythm: Normal rate and regular rhythm.   Neurological:      Mental Status: He is alert.      Motor: No weakness.      Comments: See HPI   answer simple questions but does tend to lose interest if  not directly addressed.  Negative tremors   negative cogwheel rigidity  negative bradykinesia       Psychiatric:      Comments: Demeanor is pleasant                 "

## 2024-10-23 ENCOUNTER — TELEPHONE (OUTPATIENT)
Dept: NEPHROLOGY | Facility: CLINIC | Age: 84
End: 2024-10-23

## 2024-10-23 NOTE — TELEPHONE ENCOUNTER
Called patient and spoke with his wife regarding a follow up with Dr. Fritz from [facility].   I scheduled in [place], Monday 3/3/25 at 2:30 Pm with Franca Toribio   mailing an appointment card as well.

## 2024-10-25 ENCOUNTER — TELEPHONE (OUTPATIENT)
Age: 84
End: 2024-10-25

## 2024-10-25 NOTE — TELEPHONE ENCOUNTER
[facility]  [address], [address]  [place] [address]  [phone]    Social Work Intake    LSW spoke with patient's wife-Kimmy to complete social work intake via phone, for patient's upcoming geriatric assessment on 10/29/24 with Dr. Lees.     Social/Family History   Marital status:                                        Spouse's Name:  Kimmy     Does patient have children? Yes How Many? 3 children  (If yes, where do the children live?) 2 close by  Family members assisting patient at home: wife assists   Who is available to provide care in case of illness or crisis (please specify relation to patient / level of care able to provide)? Kimmy (POA)       Employment and Education   Education: Highest Level of Education: High School Graduate    Currently Employed: No    Retired: Yes                        Date of MCC? Retired in late 50s    Type of employment: Cement work  : Yes    Lifestyle/Community Services   Clubs and Organizations: No   Major life events in past two years (ex: MCC, death in family, major illness etc.): No    Have you ever used any community-based services (ex. homecare, waiver services, meal delivery service, or respite care?): No       ACP REVIEW:  Does patient have POA: Yes  Does patient have a Living will: Yes  Any legal assistance needed for healthcare planning?: No  For all patients, we encourage seeing a  to establish a Financial Power of , a Health Care Power of , and an Advanced Directive (living will). Particularly for patients experiencing memory concerns, we strongly recommend that this is completed as soon as possible.     Safety Assessment  Is the patient still driving? No Any concerns with patients ability to drive and recent driving accidents or citations in the last year? N/A  If the patient is not driving, do they have access to transportation? Yes  wife drives & [place] also can provide  "transport  Is the patient taking medications as prescribed? Yes     Is there a system in place for medication management? Wife fills pillbox & gives them to him  Has the patient been involved in any scams? No    Are firearms or weapons in the home? No  Recommendations per [employer]: removing them from the home, keep ammunition stored separately, encourage patient to consider \"gifting\" them, if necessary, ask local law enforcement for assistance in removing the firearms from the home. The family may receive compensation from a gun buy-back program.    Does the patient use an assistive device? No Any difficulty with gait or balance? Yes  holds on to wife/slow pace Do you need any medical equipment at this time?  Interested in possible PT referral for walker  Does the patient have difficulty with hearing or vision?  Has hearing aides but does not wear them, he does wear glasses    Any falls in the last year? No    Any history of wandering? No    Does the patient live alone?  Lives with wife  What is the layout of the home, do they have difficulty with stairs? Live at [facility] on [address], use elevator mostly  Do you feel comfortable leaving the patient home alone?  Not left alone    Have you noticed any burned pans or other signs of issues with the stove or other appliances? No does not use the stove  Do you have any concerns about the patient's cooking or eating habits?  Skips meals at times, has ensure at breakfast  Have any of the patient's utilities been shut off in the last 12 months? No  Are there any concerns with pests, mold, lead, heat, water access? No  Do you have working smoke detectors and fire extinguishers in the home? Yes    To the best of your knowledge has the patient experienced any violence or abuse in the last 12 months? No  Have you thought about when it will no longer be safe for the patient to be left alone or any future planning if their memory were to decline and they have an " increase in care needs? Remain in independent living as long as possible     Caregiver Review  Do you feel like you have a basic understanding of dementia/memory loss? Yes     Do you have any needs in caring for a person living with dementia? No    Do you have social supports? Yes      Education/resources to be provided to patient/family in person or via mail: declined any needs at this time

## 2024-10-29 ENCOUNTER — CONSULT (OUTPATIENT)
Age: 84
End: 2024-10-29
Payer: COMMERCIAL

## 2024-10-29 VITALS
HEART RATE: 62 BPM | WEIGHT: 152.8 LBS | BODY MASS INDEX: 23.98 KG/M2 | DIASTOLIC BLOOD PRESSURE: 84 MMHG | SYSTOLIC BLOOD PRESSURE: 130 MMHG | TEMPERATURE: 97.2 F | OXYGEN SATURATION: 93 % | HEIGHT: 67 IN

## 2024-10-29 DIAGNOSIS — N39.42 URINARY INCONTINENCE WITHOUT SENSORY AWARENESS: Chronic | ICD-10-CM

## 2024-10-29 DIAGNOSIS — Z91.81 AT MODERATE RISK FOR FALL: ICD-10-CM

## 2024-10-29 DIAGNOSIS — G30.1 MODERATE LATE ONSET ALZHEIMER'S DEMENTIA WITH PSYCHOTIC DISTURBANCE (HCC): Primary | Chronic | ICD-10-CM

## 2024-10-29 DIAGNOSIS — N18.32 STAGE 3B CHRONIC KIDNEY DISEASE (HCC): Chronic | ICD-10-CM

## 2024-10-29 DIAGNOSIS — F02.B2 MODERATE LATE ONSET ALZHEIMER'S DEMENTIA WITH PSYCHOTIC DISTURBANCE (HCC): Primary | Chronic | ICD-10-CM

## 2024-10-29 DIAGNOSIS — I25.10 CORONARY ARTERY DISEASE INVOLVING NATIVE CORONARY ARTERY OF NATIVE HEART WITHOUT ANGINA PECTORIS: Chronic | ICD-10-CM

## 2024-10-29 PROBLEM — N18.30 BENIGN HYPERTENSION WITH CHRONIC KIDNEY DISEASE, STAGE III (HCC): Status: RESOLVED | Noted: 2023-10-25 | Resolved: 2024-10-29

## 2024-10-29 PROBLEM — D69.6 PLATELETS DECREASED (HCC): Status: RESOLVED | Noted: 2023-11-28 | Resolved: 2024-10-29

## 2024-10-29 PROBLEM — N28.1 BILATERAL RENAL CYSTS: Status: RESOLVED | Noted: 2021-03-23 | Resolved: 2024-10-29

## 2024-10-29 PROBLEM — M79.605 LEFT LEG PAIN: Status: RESOLVED | Noted: 2018-12-04 | Resolved: 2024-10-29

## 2024-10-29 PROBLEM — M76.32 IT BAND SYNDROME, LEFT: Status: RESOLVED | Noted: 2018-10-03 | Resolved: 2024-10-29

## 2024-10-29 PROBLEM — I12.9 BENIGN HYPERTENSION WITH CHRONIC KIDNEY DISEASE, STAGE III (HCC): Status: RESOLVED | Noted: 2023-10-25 | Resolved: 2024-10-29

## 2024-10-29 PROBLEM — K63.9 LESION OF SMALL INTESTINE: Status: RESOLVED | Noted: 2022-05-17 | Resolved: 2024-10-29

## 2024-10-29 PROBLEM — M25.552 LEFT HIP PAIN: Status: RESOLVED | Noted: 2018-05-31 | Resolved: 2024-10-29

## 2024-10-29 PROCEDURE — 99483 ASSMT & CARE PLN PT COG IMP: CPT | Performed by: INTERNAL MEDICINE

## 2024-10-29 RX ORDER — DONEPEZIL HYDROCHLORIDE 10 MG/1
10 TABLET, FILM COATED ORAL EVERY MORNING
Qty: 90 TABLET | Refills: 3 | Status: SHIPPED | OUTPATIENT
Start: 2024-10-29

## 2024-10-29 RX ORDER — MEMANTINE HYDROCHLORIDE 14 MG/1
14 CAPSULE, EXTENDED RELEASE ORAL EVERY MORNING
Qty: 90 CAPSULE | Refills: 3 | Status: SHIPPED | OUTPATIENT
Start: 2024-10-29

## 2024-10-29 NOTE — PROGRESS NOTES
ASSESSMENT AND PLAN:  1. 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  Appears that possibly memantine has been somewhat overly sedating for him and will change to once a day long-acting generic memantine at 14 mg daily.  As well patient likely has vivid dreaming secondary to donepezil so will change timing of donepezil to the morning to reduce nighttime vivid dreaming.  Recommend increased support during the daytime to assist with wife's care-giving.    Staging: Moderately Severe (FAST staging 6d)    Decision-making capacity: Does not have capacity for complex medical or financial decisions    Medication Review: With significant polypharmacy.  Will reduce multiple medications particularly many vitamins.  As well we will stop finasteride as benefits are unclear, and change pill dosing to all pills once a day in the morning    Fall Risk: Medium fall risk  Orders:  -     donepezil (ARICEPT) 10 mg tablet; Take 1 tablet (10 mg total) by mouth every morning  -     Memantine HCl ER 14 MG CP24; Take 1 capsule (14 mg total) by mouth every morning  2. Coronary artery disease involving native coronary artery of native heart without angina pectoris  Assessment & Plan:  Continue ASA / rosuvastatin.  3. Stage 3b chronic kidney disease (HCC)  Assessment & Plan:  Stable.  Continue losartan.  4. Urinary incontinence without sensory awareness  Assessment & Plan:  Previous diagnoses of BPH is questionable.  Patient has low postvoid residuals.  At this point we will stop finasteride therapy.  About 15% of patients may have increased depressive symptoms with finasteride as well.  5. At moderate risk for fall  Assessment & Plan:  Recommend consideration of home PT/OT evaluation.    HPI:    We had the pleasure of evaluating Yung Concepcion who is a 84 y.o. male in Geriatric  consultation today, along with wife (Kimmy) and daughter (Lety) to provide further history.    COGNITION:  Memory Issues noticed since about 8 year(s)    Memory affected: short term memory loss    Symptoms started: gradual  Over time the memory has:  worsened  Memory issue(s) were noted by: family   Changes in mood or personality: Yes, occasionally with agitation  Seems anxious: No  Seems depressed: No  With suicidal ideation: No    Current or previous treatment for depression or anxiety: No    Family member with dementia and what type? no  History of head trauma: No  History of stroke: No  History of alcohol or substance abuse: No    FUNCTIONAL STATUS:  BADLs  Does patient require assistance with:  Bathing: Yes, needs reminders  Dressing: Yes  Transferring: No  Continence: Yes, wears adult diapers.  No fecal incontinence  Toileting: No  Feeding: No    IADLs  Dose patient require assistance with:  Telephone: Yes  Shopping: Yes  Food Preparation: Yes  Housekeeping: Yes  Laundry: Yes  Transportation: Yes  Medications: Yes  Finances: Yes    NEUROPSYCH SYMPTOMS:  Does patient less interested in his or her usual activities or in the activities and plans of others? Yes  Does patient get angry or hostile?  Resist care from others? Yes  Does patient act impatient and cranky? Does mood frequently change for no apparent reason? No  Does patient have trouble sleeping at night? No  Does patient see or hear things that no one else can see or hear? Yes, seeing fish on the floor  Does patient act suspicious without good reason (example: believes that others are stealing from him or her, or planning to harm him or her in some way)? No    SAFETY:  Hearing issue: Yes  Vision issue: Yes, mild, good with corrective glasses  Any gait or balance disorder: Yes  Uses: No Assistive Devices  Any falls in the last year: No  Any history of wandering: No  Are there firearms or guns in the home: No  Does patient drive: No  Any driving  accidents or citations in the last year: No  Any concerns about patient's ability to drive: Yes  POA papers completed: Yes    SAFETY (per SW intake):      No Known Allergies    Medications:    Current Outpatient Medications on File Prior to Visit   Medication Sig Dispense Refill    aspirin (ECOTRIN LOW STRENGTH) 81 mg EC tablet Take 81 mg by mouth      losartan (COZAAR) 50 mg tablet TAKE 1 TABLET BY MOUTH EVERY DAY 90 tablet 1    Multiple Vitamins-Minerals (MULTI-VITAMIN/MINERALS PO) Take by mouth      rosuvastatin (CRESTOR) 10 MG tablet Take 10 mg by mouth daily      [DISCONTINUED] b complex vitamins tablet Take 1 tablet by mouth daily      [DISCONTINUED] Biotin w/ Vitamins C & E 1250-7.5-7.5 MCG-MG-UNT CHEW Chew      [DISCONTINUED] Coenzyme Q10 (CO Q10) 100 MG CAPS Take by mouth      [DISCONTINUED] donepezil (ARICEPT) 10 mg tablet TAKE 1 TABLET BY MOUTH EVERYDAY AT BEDTIME 90 tablet 1    [DISCONTINUED] finasteride (PROSCAR) 5 mg tablet TAKE 1 TABLET (5 MG TOTAL) BY MOUTH DAILY. 90 tablet 3    [DISCONTINUED] memantine (NAMENDA) 10 mg tablet TAKE 1 TABLET (10 MG TOTAL) BY MOUTH 2 (TWO) TIMES A DAY 2 PILLS IN AM AND 1 IN  tablet 1    [DISCONTINUED] zinc gluconate 50 mg tablet Take 50 mg by mouth daily      [DISCONTINUED] CHONDROITIN SULFATE A PO Take by mouth (Patient not taking: Reported on 10/29/2024)      [DISCONTINUED] ipratropium (ATROVENT) 0.06 % nasal spray 2 sprays into each nostril 3 (three) times a day as needed for rhinitis (Patient not taking: Reported on 6/21/2022) 15 mL 3     No current facility-administered medications on file prior to visit.       History:  Past Medical History:   Diagnosis Date    Basal cell carcinoma     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     Coronary artery disease     CAD s/p stent 1995    Elevated PSA     R....11/16/17    Hypertension     Wears glasses      Past Surgical History:   Procedure Laterality Date    COLONOSCOPY      INGUINAL HERNIA  REPAIR      MOHS SURGERY      SINUS SURGERY Right 09/29/2022    SKIN BIOPSY      basal cell     TONSILLECTOMY       Family History   Problem Relation Age of Onset    COPD Mother     Heart failure Mother     Heart attack Father     Lung cancer Brother     No Known Problems Son     No Known Problems Daughter     No Known Problems Daughter      Social History     Socioeconomic History    Marital status: /Civil Union     Spouse name: Kimmy     Number of children: 3    Years of education: Not on file    Highest education level: Not on file   Occupational History    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      Comment: retired    Tobacco Use    Smoking status: Never    Smokeless tobacco: Never   Vaping Use    Vaping status: Never Used   Substance and Sexual Activity    Alcohol use: No    Drug use: No    Sexual activity: Yes     Partners: Female   Other Topics Concern    Not on file   Social History Narrative    Who lives in your home: wife     What type of home do you live in: Other prison home ([place])     Age of your home: 2.5 yrs the portion where they live     How long have you been living there: 2017    Type of heat: Forced hot air    Type of fuel: Electric    What type of sadia is in your bedroom: Area rugs and Hardwood floor    Do you have the following in or near your home:    Air products: Central air    Pests: None    Pets: None    Are pets allowed in bedroom: N/A    Open fields, wooded areas nearby: Open fields    Basement: None    Exposure to second hand smoke: No        Habits:    Caffeine: soda occasionally-hot tea 1-2 cups daily     Chocolate: rarely     Other:     Social Determinants of Health     Financial Resource Strain: Low Risk  (11/27/2023)    Overall Financial Resource Strain (CARDIA)     Difficulty of Paying Living Expenses: Not hard at all   Food Insecurity: Not on file   Transportation Needs: No Transportation Needs (11/27/2023)    PRAPARE - Transportation     Lack of Transportation  "(Medical): No     Lack of Transportation (Non-Medical): No   Physical Activity: Sufficiently Active (7/29/2020)    Exercise Vital Sign     Days of Exercise per Week: 7 days     Minutes of Exercise per Session: 30 min   Stress: Not on file   Social Connections: Not on file   Intimate Partner Violence: Not on file   Housing Stability: Not on file     Past Surgical History:   Procedure Laterality Date    COLONOSCOPY      INGUINAL HERNIA REPAIR      MOHS SURGERY      SINUS SURGERY Right 09/29/2022    SKIN BIOPSY      basal cell     TONSILLECTOMY         OBJECTIVE:  Vitals:    10/29/24 0958   BP: 130/84   BP Location: Left arm   Patient Position: Sitting   Cuff Size: Adult   Pulse: 62   Temp: (!) 97.2 °F (36.2 °C)   TempSrc: Temporal   SpO2: 93%   Weight: 69.3 kg (152 lb 12.8 oz)   Height: 5' 7\" (1.702 m)     Body mass index is 23.93 kg/m².  Physical Exam  Constitutional:       General: He is not in acute distress.  HENT:      Head: Normocephalic and atraumatic.      Right Ear: External ear normal.      Left Ear: External ear normal.      Nose: Nose normal.   Eyes:      General: No scleral icterus.     Conjunctiva/sclera: Conjunctivae normal.   Pulmonary:      Effort: Pulmonary effort is normal. No respiratory distress.   Abdominal:      General: There is no distension.   Skin:     Coloration: Skin is not jaundiced or pale.   Neurological:      General: No focal deficit present.      Mental Status: He is alert.   Psychiatric:         Mood and Affect: Mood normal.      Comments: Apathetic appearing         MoCA: 2/30 (unable to complete)      Labs & Imaging:  Lab Results   Component Value Date    WBC 7.69 05/31/2024    HGB 15.1 05/31/2024    HCT 45.0 05/31/2024    MCV 96 05/31/2024     05/31/2024     Lab Results   Component Value Date     09/12/2017    SODIUM 142 05/31/2024    K 4.4 05/31/2024     05/31/2024    CO2 27 05/31/2024    AGAP 10 05/31/2024    BUN 25 05/31/2024    CREATININE 1.46 (H) " "05/31/2024    GLUC 95 01/03/2022    GLUF 84 05/31/2024    CALCIUM 9.3 05/31/2024    AST 24 05/31/2024    ALT 21 05/31/2024    ALKPHOS 69 05/31/2024    PROT 6.5 05/15/2017    TP 6.8 05/31/2024    BILITOT 0.6 05/15/2017    TBILI 0.63 05/31/2024    EGFR 43 05/31/2024     No results found for: \"HGBA1C\"  Lab Results   Component Value Date    CHOLESTEROL 177 05/31/2024    CHOLESTEROL 155 12/02/2022    CHOLESTEROL 154 09/01/2022     Lab Results   Component Value Date    HDL 39 (L) 05/31/2024    HDL 45 12/02/2022    HDL 40 09/01/2022     Lab Results   Component Value Date    TRIG 180 (H) 05/31/2024    TRIG 131 12/02/2022    TRIG 108 09/01/2022     Lab Results   Component Value Date    NONHDLC 138 05/31/2024    NONHDLC 110 12/02/2022    NONHDLC 114 09/01/2022     Lab Results   Component Value Date    LDLCALC 102 (H) 05/31/2024    LDLCALC 84 12/02/2022     Lab Results   Component Value Date    GNXEECHI21 752 12/02/2022     Lab Results   Component Value Date    CXU6QJWPTLYI 3.140 05/31/2024    TSH 3.22 07/30/2019     Lab Results   Component Value Date    BPRV21YIOYQG 45.4 03/26/2024            Results for orders placed during the hospital encounter of 06/01/23    MRI brain NeuroQuant wo contrast    Narrative  MRI BRAIN WITHOUT CONTRAST, NeuroQuant IMAGING    INDICATION: R41.3: Other amnesia.    COMPARISON:   None.    TECHNIQUE:  Multiplanar, multisequence imaging of the brain was performed.  Sagittal 3D volumetric imaging processed by NEUROQUANT software creating General Morphology and Age Related Atrophy reports.      IMAGE QUALITY:  Diagnostic.    FINDINGS:    BRAIN PARENCHYMA:  There is no discrete mass, mass effect or midline shift.  Brainstem and cerebellum demonstrate normal signal. There is no intracranial hemorrhage.  There is no evidence of acute infarction and diffusion imaging is unremarkable.  Cerebral volume loss. Scattered chronic microvascular ischemic change. Sulcal hemosiderosis right frontal and left " posterior temporal lobes.    QUANTITATIVE:    Exam Quality: Adequate for volumetric analysis.    Hippocampus  Hippocampal Occupancy Score (HOC):                   0.23  Percentile for similar age:                              1    Total hippocampal volume (cc):                           3.91  Percentile for similar age:                              1    Entorhinal cortex (cc)                                            3.73  Percentile for similar age:                                   2    Superior Lateral ventricular volume (cc):             108.9  Percentile for similar age:                             99    Inferior lateral ventricular volume (cc):                    13.61  Percentile for similar age:                                99    Quantitative conclusions:  Hippocampal Volume:                       Low volume  Entorhinal Volume:                            Low volume  Superior Lateral Ventricle Volume:     High Volume  Inferior Lateral Ventricle Volume:       High Volume    Concordance between qualitative and quantitative hippocampal volume assessment: Concordant.    Change in brain volumes: No previous volumetric study for comparison    Mean hippocampal volume loss among normal elderly: 0.7% per year, (-0.3 to 1.7;  Masood 2008; also Cristian 2010).    VENTRICLES:  Normal for the patient's age.    SELLA AND PITUITARY GLAND:  Normal.    ORBITS:  Normal.    PARANASAL SINUSES: There is chronic deformity of the right posterolateral maxillary sinus wall.    VASCULATURE:  Evaluation of the major intracranial vasculature demonstrates appropriate flow voids. Dolichoectasia of the vertebrobasilar system.    CALVARIUM AND SKULL BASE:  Normal.    EXTRACRANIAL SOFT TISSUES:  Normal.    Impression  No mass effect, acute intracranial hemorrhage or evidence of recent infarction. Mild chronic microvascular ischemic change.    NeuroQuant analysis was performed: Low hippocampal volume and enlarged inferior lateral and  overall ventricular system, suggestive of global ex-vacuo dilatation.  The additional finding of an abnormal Hippocampal Occupancy Score, (HOC), as well as low  entorhinal cortex volume is concerning for a mesial temporal lobe focused Neurodegenerative process.

## 2024-10-29 NOTE — PATIENT INSTRUCTIONS
ASSESSMENT AND PLAN:  1. 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  Appears that possibly memantine has been somewhat overly sedating for him and will change to once a day long-acting generic memantine at 14 mg daily.  As well patient likely has vivid dreaming secondary to donepezil so will change timing of donepezil to the morning to reduce nighttime vivid dreaming.  Recommend increased support during the daytime to assist with wife's care-giving.    Staging: Moderately Severe (FAST staging 6d)    Decision-making capacity: Does not have capacity for complex medical or financial decisions    Medication Review: With significant polypharmacy.  Will reduce multiple medications particularly many vitamins.  As well we will stop finasteride as benefits are unclear, and change pill dosing to all pills once a day in the morning    Fall Risk: Medium fall risk  Orders:  -     donepezil (ARICEPT) 10 mg tablet; Take 1 tablet (10 mg total) by mouth every morning  -     Memantine HCl ER 14 MG CP24; Take 1 capsule (14 mg total) by mouth every morning  2. Coronary artery disease involving native coronary artery of native heart without angina pectoris  Assessment & Plan:  Continue ASA / rosuvastatin.  3. Stage 3b chronic kidney disease (HCC)  Assessment & Plan:  Stable.  Continue losartan.  4. Urinary incontinence without sensory awareness  Assessment & Plan:  Previous diagnoses of BPH is questionable.  Patient has low postvoid residuals.  At this point we will stop finasteride therapy.  About 15% of patients may have increased depressive symptoms with finasteride as well.  5. At moderate risk for fall  Assessment & Plan:  Recommend consideration of home PT/OT evaluation.    HPI:    We had the pleasure of evaluating Yung Concepcion who is a 84 y.o. male in Geriatric  consultation today, along with wife (Kimmy) and daughter (Lety) to provide further history.    COGNITION:  Memory Issues noticed since about 8 year(s)    Memory affected: short term memory loss    Symptoms started: gradual  Over time the memory has:  worsened  Memory issue(s) were noted by: family   Changes in mood or personality: Yes, occasionally with agitation  Seems anxious: No  Seems depressed: No  With suicidal ideation: No    Current or previous treatment for depression or anxiety: No    Family member with dementia and what type? no  History of head trauma: No  History of stroke: No  History of alcohol or substance abuse: No    FUNCTIONAL STATUS:  BADLs  Does patient require assistance with:  Bathing: Yes, needs reminders  Dressing: Yes  Transferring: No  Continence: Yes, wears adult diapers.  No fecal incontinence  Toileting: No  Feeding: No    IADLs  Dose patient require assistance with:  Telephone: Yes  Shopping: Yes  Food Preparation: Yes  Housekeeping: Yes  Laundry: Yes  Transportation: Yes  Medications: Yes  Finances: Yes    NEUROPSYCH SYMPTOMS:  Does patient less interested in his or her usual activities or in the activities and plans of others? Yes  Does patient get angry or hostile?  Resist care from others? Yes  Does patient act impatient and cranky? Does mood frequently change for no apparent reason? No  Does patient have trouble sleeping at night? No  Does patient see or hear things that no one else can see or hear? Yes, seeing fish on the floor  Does patient act suspicious without good reason (example: believes that others are stealing from him or her, or planning to harm him or her in some way)? No    SAFETY:  Hearing issue: Yes  Vision issue: Yes, mild, good with corrective glasses  Any gait or balance disorder: Yes  Uses: No Assistive Devices  Any falls in the last year: No  Any history of wandering: No  Are there firearms or guns in the home: No  Does patient drive: No  Any driving  accidents or citations in the last year: No  Any concerns about patient's ability to drive: Yes  POA papers completed: Yes    SAFETY (per SW intake):      No Known Allergies    Medications:    Current Outpatient Medications on File Prior to Visit   Medication Sig Dispense Refill    aspirin (ECOTRIN LOW STRENGTH) 81 mg EC tablet Take 81 mg by mouth      losartan (COZAAR) 50 mg tablet TAKE 1 TABLET BY MOUTH EVERY DAY 90 tablet 1    Multiple Vitamins-Minerals (MULTI-VITAMIN/MINERALS PO) Take by mouth      rosuvastatin (CRESTOR) 10 MG tablet Take 10 mg by mouth daily      [DISCONTINUED] b complex vitamins tablet Take 1 tablet by mouth daily      [DISCONTINUED] Biotin w/ Vitamins C & E 1250-7.5-7.5 MCG-MG-UNT CHEW Chew      [DISCONTINUED] Coenzyme Q10 (CO Q10) 100 MG CAPS Take by mouth      [DISCONTINUED] donepezil (ARICEPT) 10 mg tablet TAKE 1 TABLET BY MOUTH EVERYDAY AT BEDTIME 90 tablet 1    [DISCONTINUED] finasteride (PROSCAR) 5 mg tablet TAKE 1 TABLET (5 MG TOTAL) BY MOUTH DAILY. 90 tablet 3    [DISCONTINUED] memantine (NAMENDA) 10 mg tablet TAKE 1 TABLET (10 MG TOTAL) BY MOUTH 2 (TWO) TIMES A DAY 2 PILLS IN AM AND 1 IN  tablet 1    [DISCONTINUED] zinc gluconate 50 mg tablet Take 50 mg by mouth daily      [DISCONTINUED] CHONDROITIN SULFATE A PO Take by mouth (Patient not taking: Reported on 10/29/2024)      [DISCONTINUED] ipratropium (ATROVENT) 0.06 % nasal spray 2 sprays into each nostril 3 (three) times a day as needed for rhinitis (Patient not taking: Reported on 6/21/2022) 15 mL 3     No current facility-administered medications on file prior to visit.       History:  Past Medical History:   Diagnosis Date    Basal cell carcinoma     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     Coronary artery disease     CAD s/p stent 1995    Elevated PSA     R....11/16/17    Hypertension     Wears glasses      Past Surgical History:   Procedure Laterality Date    COLONOSCOPY      INGUINAL HERNIA  REPAIR      MOHS SURGERY      SINUS SURGERY Right 09/29/2022    SKIN BIOPSY      basal cell     TONSILLECTOMY       Family History   Problem Relation Age of Onset    COPD Mother     Heart failure Mother     Heart attack Father     Lung cancer Brother     No Known Problems Son     No Known Problems Daughter     No Known Problems Daughter      Social History     Socioeconomic History    Marital status: /Civil Union     Spouse name: Kimmy     Number of children: 3    Years of education: Not on file    Highest education level: Not on file   Occupational History    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      Comment: retired    Tobacco Use    Smoking status: Never    Smokeless tobacco: Never   Vaping Use    Vaping status: Never Used   Substance and Sexual Activity    Alcohol use: No    Drug use: No    Sexual activity: Yes     Partners: Female   Other Topics Concern    Not on file   Social History Narrative    Who lives in your home: wife     What type of home do you live in: Other residential home ([place])     Age of your home: 2.5 yrs the portion where they live     How long have you been living there: 2017    Type of heat: Forced hot air    Type of fuel: Electric    What type of sadia is in your bedroom: Area rugs and Hardwood floor    Do you have the following in or near your home:    Air products: Central air    Pests: None    Pets: None    Are pets allowed in bedroom: N/A    Open fields, wooded areas nearby: Open fields    Basement: None    Exposure to second hand smoke: No        Habits:    Caffeine: soda occasionally-hot tea 1-2 cups daily     Chocolate: rarely     Other:     Social Determinants of Health     Financial Resource Strain: Low Risk  (11/27/2023)    Overall Financial Resource Strain (CARDIA)     Difficulty of Paying Living Expenses: Not hard at all   Food Insecurity: Not on file   Transportation Needs: No Transportation Needs (11/27/2023)    PRAPARE - Transportation     Lack of Transportation  "(Medical): No     Lack of Transportation (Non-Medical): No   Physical Activity: Sufficiently Active (7/29/2020)    Exercise Vital Sign     Days of Exercise per Week: 7 days     Minutes of Exercise per Session: 30 min   Stress: Not on file   Social Connections: Not on file   Intimate Partner Violence: Not on file   Housing Stability: Not on file     Past Surgical History:   Procedure Laterality Date    COLONOSCOPY      INGUINAL HERNIA REPAIR      MOHS SURGERY      SINUS SURGERY Right 09/29/2022    SKIN BIOPSY      basal cell     TONSILLECTOMY         OBJECTIVE:  Vitals:    10/29/24 0958   BP: 130/84   BP Location: Left arm   Patient Position: Sitting   Cuff Size: Adult   Pulse: 62   Temp: (!) 97.2 °F (36.2 °C)   TempSrc: Temporal   SpO2: 93%   Weight: 69.3 kg (152 lb 12.8 oz)   Height: 5' 7\" (1.702 m)     Body mass index is 23.93 kg/m².  Physical Exam  Constitutional:       General: He is not in acute distress.  HENT:      Head: Normocephalic and atraumatic.      Right Ear: External ear normal.      Left Ear: External ear normal.      Nose: Nose normal.   Eyes:      General: No scleral icterus.     Conjunctiva/sclera: Conjunctivae normal.   Pulmonary:      Effort: Pulmonary effort is normal. No respiratory distress.   Abdominal:      General: There is no distension.   Skin:     Coloration: Skin is not jaundiced or pale.   Neurological:      General: No focal deficit present.      Mental Status: He is alert.   Psychiatric:         Mood and Affect: Mood normal.      Comments: Apathetic appearing         MoCA: 2/30 (unable to complete)      Labs & Imaging:  Lab Results   Component Value Date    WBC 7.69 05/31/2024    HGB 15.1 05/31/2024    HCT 45.0 05/31/2024    MCV 96 05/31/2024     05/31/2024     Lab Results   Component Value Date     09/12/2017    SODIUM 142 05/31/2024    K 4.4 05/31/2024     05/31/2024    CO2 27 05/31/2024    AGAP 10 05/31/2024    BUN 25 05/31/2024    CREATININE 1.46 (H) " "05/31/2024    GLUC 95 01/03/2022    GLUF 84 05/31/2024    CALCIUM 9.3 05/31/2024    AST 24 05/31/2024    ALT 21 05/31/2024    ALKPHOS 69 05/31/2024    PROT 6.5 05/15/2017    TP 6.8 05/31/2024    BILITOT 0.6 05/15/2017    TBILI 0.63 05/31/2024    EGFR 43 05/31/2024     No results found for: \"HGBA1C\"  Lab Results   Component Value Date    CHOLESTEROL 177 05/31/2024    CHOLESTEROL 155 12/02/2022    CHOLESTEROL 154 09/01/2022     Lab Results   Component Value Date    HDL 39 (L) 05/31/2024    HDL 45 12/02/2022    HDL 40 09/01/2022     Lab Results   Component Value Date    TRIG 180 (H) 05/31/2024    TRIG 131 12/02/2022    TRIG 108 09/01/2022     Lab Results   Component Value Date    NONHDLC 138 05/31/2024    NONHDLC 110 12/02/2022    NONHDLC 114 09/01/2022     Lab Results   Component Value Date    LDLCALC 102 (H) 05/31/2024    LDLCALC 84 12/02/2022     Lab Results   Component Value Date    YTMSLWEL83 752 12/02/2022     Lab Results   Component Value Date    QYE4UDRDKMAE 3.140 05/31/2024    TSH 3.22 07/30/2019     Lab Results   Component Value Date    MNTI48GCDUSO 45.4 03/26/2024            Results for orders placed during the hospital encounter of 06/01/23    MRI brain NeuroQuant wo contrast    Narrative  MRI BRAIN WITHOUT CONTRAST, NeuroQuant IMAGING    INDICATION: R41.3: Other amnesia.    COMPARISON:   None.    TECHNIQUE:  Multiplanar, multisequence imaging of the brain was performed.  Sagittal 3D volumetric imaging processed by NEUROQUANT software creating General Morphology and Age Related Atrophy reports.      IMAGE QUALITY:  Diagnostic.    FINDINGS:    BRAIN PARENCHYMA:  There is no discrete mass, mass effect or midline shift.  Brainstem and cerebellum demonstrate normal signal. There is no intracranial hemorrhage.  There is no evidence of acute infarction and diffusion imaging is unremarkable.  Cerebral volume loss. Scattered chronic microvascular ischemic change. Sulcal hemosiderosis right frontal and left " posterior temporal lobes.    QUANTITATIVE:    Exam Quality: Adequate for volumetric analysis.    Hippocampus  Hippocampal Occupancy Score (HOC):                   0.23  Percentile for similar age:                              1    Total hippocampal volume (cc):                           3.91  Percentile for similar age:                              1    Entorhinal cortex (cc)                                            3.73  Percentile for similar age:                                   2    Superior Lateral ventricular volume (cc):             108.9  Percentile for similar age:                             99    Inferior lateral ventricular volume (cc):                    13.61  Percentile for similar age:                                99    Quantitative conclusions:  Hippocampal Volume:                       Low volume  Entorhinal Volume:                            Low volume  Superior Lateral Ventricle Volume:     High Volume  Inferior Lateral Ventricle Volume:       High Volume    Concordance between qualitative and quantitative hippocampal volume assessment: Concordant.    Change in brain volumes: No previous volumetric study for comparison    Mean hippocampal volume loss among normal elderly: 0.7% per year, (-0.3 to 1.7;  Masood 2008; also Cristian 2010).    VENTRICLES:  Normal for the patient's age.    SELLA AND PITUITARY GLAND:  Normal.    ORBITS:  Normal.    PARANASAL SINUSES: There is chronic deformity of the right posterolateral maxillary sinus wall.    VASCULATURE:  Evaluation of the major intracranial vasculature demonstrates appropriate flow voids. Dolichoectasia of the vertebrobasilar system.    CALVARIUM AND SKULL BASE:  Normal.    EXTRACRANIAL SOFT TISSUES:  Normal.    Impression  No mass effect, acute intracranial hemorrhage or evidence of recent infarction. Mild chronic microvascular ischemic change.    NeuroQuant analysis was performed: Low hippocampal volume and enlarged inferior lateral and  overall ventricular system, suggestive of global ex-vacuo dilatation.  The additional finding of an abnormal Hippocampal Occupancy Score, (HOC), as well as low  entorhinal cortex volume is concerning for a mesial temporal lobe focused Neurodegenerative process.

## 2024-10-29 NOTE — ASSESSMENT & PLAN NOTE
Previous diagnoses of BPH is questionable.  Patient has low postvoid residuals.  At this point we will stop finasteride therapy.  About 15% of patients may have increased depressive symptoms with finasteride as well.

## 2024-10-29 NOTE — ASSESSMENT & PLAN NOTE
Family history, MRI review with low hippocampal volume is all consistent with Alzheimer's type dementia.  Appears that possibly memantine has been somewhat overly sedating for him and will change to once a day long-acting generic memantine at 14 mg daily.  As well patient likely has vivid dreaming secondary to donepezil so will change timing of donepezil to the morning to reduce nighttime vivid dreaming.  Recommend increased support during the daytime to assist with wife's care-giving.    Staging: Moderately Severe (FAST staging 6d)    Decision-making capacity: Does not have capacity for complex medical or financial decisions    Medication Review: With significant polypharmacy.  Will reduce multiple medications particularly many vitamins.  As well we will stop finasteride as benefits are unclear, and change pill dosing to all pills once a day in the morning    Fall Risk: Medium fall risk

## 2024-11-03 DIAGNOSIS — N18.30 BENIGN HYPERTENSION WITH CHRONIC KIDNEY DISEASE, STAGE III (HCC): ICD-10-CM

## 2024-11-03 DIAGNOSIS — I12.9 BENIGN HYPERTENSION WITH CHRONIC KIDNEY DISEASE, STAGE III (HCC): ICD-10-CM

## 2024-11-04 RX ORDER — LOSARTAN POTASSIUM 50 MG/1
TABLET ORAL
Qty: 90 TABLET | Refills: 1 | Status: SHIPPED | OUTPATIENT
Start: 2024-11-04

## 2024-12-13 ENCOUNTER — RA CDI HCC (OUTPATIENT)
Dept: OTHER | Facility: HOSPITAL | Age: 84
End: 2024-12-13

## 2024-12-19 ENCOUNTER — OFFICE VISIT (OUTPATIENT)
Age: 84
End: 2024-12-19
Payer: COMMERCIAL

## 2024-12-19 VITALS
WEIGHT: 147.2 LBS | DIASTOLIC BLOOD PRESSURE: 70 MMHG | TEMPERATURE: 95.8 F | HEIGHT: 65 IN | BODY MASS INDEX: 24.53 KG/M2 | SYSTOLIC BLOOD PRESSURE: 118 MMHG | OXYGEN SATURATION: 94 % | HEART RATE: 70 BPM

## 2024-12-19 DIAGNOSIS — Z00.00 MEDICARE ANNUAL WELLNESS VISIT, SUBSEQUENT: ICD-10-CM

## 2024-12-19 DIAGNOSIS — E78.5 HYPERLIPIDEMIA, UNSPECIFIED HYPERLIPIDEMIA TYPE: ICD-10-CM

## 2024-12-19 DIAGNOSIS — I10 ESSENTIAL HYPERTENSION: Primary | ICD-10-CM

## 2024-12-19 DIAGNOSIS — Z13.29 THYROID DISORDER SCREENING: ICD-10-CM

## 2024-12-19 DIAGNOSIS — G30.9 ALZHEIMER DISEASE (HCC): ICD-10-CM

## 2024-12-19 DIAGNOSIS — F02.80 ALZHEIMER DISEASE (HCC): ICD-10-CM

## 2024-12-19 DIAGNOSIS — J30.0 VASOMOTOR RHINITIS: ICD-10-CM

## 2024-12-19 DIAGNOSIS — Z13.1 SCREENING FOR DIABETES MELLITUS (DM): ICD-10-CM

## 2024-12-19 PROCEDURE — 99214 OFFICE O/P EST MOD 30 MIN: CPT | Performed by: INTERNAL MEDICINE

## 2024-12-19 PROCEDURE — G0439 PPPS, SUBSEQ VISIT: HCPCS | Performed by: INTERNAL MEDICINE

## 2024-12-19 NOTE — PROGRESS NOTES
Assessment and Plan:     Problem List Items Addressed This Visit       Vasomotor rhinitis     Other Visit Diagnoses         Essential hypertension    -  Primary    Relevant Orders    CBC and differential      Hyperlipidemia, unspecified hyperlipidemia type        Relevant Orders    Comprehensive metabolic panel    Lipid Panel with Direct LDL reflex      Alzheimer disease (HCC)          Medicare annual wellness visit, subsequent          Thyroid disorder screening        Relevant Orders    TSH, 3rd generation with Free T4 reflex      Screening for diabetes mellitus (DM)        Relevant Orders    Hemoglobin A1C             Preventive health issues were discussed with patient, and age appropriate screening tests were ordered as noted in patient's After Visit Summary.  Personalized health advice and appropriate referrals for health education or preventive services given if needed, as noted in patient's After Visit Summary.     History of Present Illness:     Patient presents for a Medicare Wellness Visit    HPI   Patient hyperlipidemia Alzheimer's disease is here to follow-up on chronic medical problems and Medicare visit.  He is accompanied today by his wife.  They are living in independent living in [facility].  Wife helps with his ADLs.  He has been seen by physical therapy at that [facility] and has been recommended walker but has not been using.  No recent falls.  Has been more sedated.  His memantine was changed to generic brand.  His wife has not seen much significance.  Is not having vivid dreams anymore since Aricept was changed in the morning.  He does tend to fall asleep several times during the day.  I encouraged his wife to allow him to take short naps after his meals and encourage him brain activities at times.  Lab studies are due.  Up-to-date with vaccination.  Weight is stable. Patient Care Team:  Yary Bennett MD as PCP - General (Internal Medicine)  Luis Antonio Bonner MD as PCP - PCP-[facility]  [facility]  MD Cliff Evans MD (Nephrology)     Review of Systems:     Review of Systems   Problem List:     Patient Active Problem List   Diagnosis    Urinary incontinence without sensory awareness    Coronary artery disease involving native coronary artery of native heart without angina pectoris    History of malignant melanoma of skin    Vasomotor rhinitis    Stage 3b chronic kidney disease (HCC)    Secondary hyperparathyroidism of renal origin (HCC)    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)    At moderate risk for fall      Past Medical and Surgical History:     Past Medical History:   Diagnosis Date    Basal cell carcinoma     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     Coronary artery disease     CAD s/p stent 1995    Elevated PSA     R....11/16/17    Hypertension     Wears glasses      Past Surgical History:   Procedure Laterality Date    COLONOSCOPY      INGUINAL HERNIA REPAIR      MOHS SURGERY      SINUS SURGERY Right 09/29/2022    SKIN BIOPSY      basal cell     TONSILLECTOMY        Family History:     Family History   Problem Relation Age of Onset    COPD Mother     Heart failure Mother     Heart attack Father     Lung cancer Brother     No Known Problems Son     No Known Problems Daughter     No Known Problems Daughter       Social History:     Social History     Socioeconomic History    Marital status: /Civil Union     Spouse name: Kimmy     Number of children: 3    Years of education: None    Highest education level: None   Occupational History    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      Comment: retired    Tobacco Use    Smoking status: Never    Smokeless tobacco: Never   Vaping Use    Vaping status: Never Used   Substance and Sexual Activity    Alcohol use: No    Drug use: No    Sexual activity: Yes     Partners: Female   Other Topics Concern    None   Social History Narrative    Who lives in your home:  wife     What type of home do you live in: Other [facility] home ([place])     Age of your home: 2.5 yrs the portion where they live     How long have you been living there: 2017    Type of heat: Forced hot air    Type of fuel: Electric    What type of sadia is in your bedroom: Area rugs and Hardwood floor    Do you have the following in or near your home:    Air products: Central air    Pests: None    Pets: None    Are pets allowed in bedroom: N/A    Open fields, wooded areas nearby: Open fields    Basement: None    Exposure to second hand smoke: No        Habits:    Caffeine: soda occasionally-hot tea 1-2 cups daily     Chocolate: rarely     Other:     Social Drivers of Health     Financial Resource Strain: Low Risk  (11/27/2023)    Overall Financial Resource Strain (CARDIA)     Difficulty of Paying Living Expenses: Not hard at all   Food Insecurity: No Food Insecurity (12/17/2024)    Hunger Vital Sign     Worried About Running Out of Food in the Last Year: Never true     Ran Out of Food in the Last Year: Never true   Transportation Needs: No Transportation Needs (12/17/2024)    PRAPARE - Transportation     Lack of Transportation (Medical): No     Lack of Transportation (Non-Medical): No   Physical Activity: Sufficiently Active (7/29/2020)    Exercise Vital Sign     Days of Exercise per Week: 7 days     Minutes of Exercise per Session: 30 min   Stress: Not on file   Social Connections: Not on file   Intimate Partner Violence: Not on file   Housing Stability: Low Risk  (12/17/2024)    Housing Stability Vital Sign     Unable to Pay for Housing in the Last Year: No     Number of Times Moved in the Last Year: 0     Homeless in the Last Year: No      Medications and Allergies:     Current Outpatient Medications   Medication Sig Dispense Refill    donepezil (ARICEPT) 10 mg tablet Take 1 tablet (10 mg total) by mouth every morning 90 tablet 3    losartan (COZAAR) 50 mg tablet TAKE 1 TABLET BY MOUTH EVERY DAY 90  tablet 1    Memantine HCl ER 14 MG CP24 Take 1 capsule (14 mg total) by mouth every morning 90 capsule 3    rosuvastatin (CRESTOR) 10 MG tablet Take 10 mg by mouth daily      aspirin (ECOTRIN LOW STRENGTH) 81 mg EC tablet Take 81 mg by mouth (Patient not taking: Reported on 12/19/2024)      Multiple Vitamins-Minerals (MULTI-VITAMIN/MINERALS PO) Take by mouth (Patient not taking: Reported on 12/19/2024)       No current facility-administered medications for this visit.     No Known Allergies   Immunizations:     Immunization History   Administered Date(s) Administered    COVID-19 MODERNA VACC 0.5 ML IM 01/13/2021, 02/10/2021, 10/27/2021, 06/03/2022    COVID-19 Moderna Vac BIVALENT 12 Yr+ IM 0.5 ML 10/28/2022    COVID-19 Moderna mRNA Vaccine 12 Yr+ 50 mcg/0.5 mL (Spikevax) 12/20/2023, 09/20/2024    COVID-19, unspecified 01/13/2021, 10/27/2021, 06/03/2022    INFLUENZA 10/19/2022, 09/18/2023    Influenza Split High Dose Preservative Free IM 12/03/2013, 10/28/2014, 10/21/2015, 10/28/2016, 11/21/2017, 10/02/2019, 10/10/2024    Influenza, high dose seasonal 0.7 mL 10/05/2018, 10/02/2019, 10/12/2021, 10/19/2022, 09/18/2023    Influenza, seasonal, injectable 10/17/2007, 10/29/2008, 10/21/2009, 10/18/2010, 11/21/2011    Pneumococcal Conjugate 13-Valent 05/06/2016    Pneumococcal Conjugate Vaccine 20-valent (Pcv20), Polysace 06/29/2022    Pneumococcal Polysaccharide PPV23 01/06/2006    Respiratory Syncytial Virus Vaccine (Recombinant) 10/05/2023    Respiratory Syncytial Virus Vaccine (Recombinant, Adjuvanted) 10/05/2023    Unknown 01/13/2021    Zoster 11/27/2007    Zoster Vaccine Recombinant 10/16/2019, 01/08/2020      Health Maintenance:         Topic Date Due    Hepatitis C Screening  Completed     There are no preventive care reminders to display for this patient.   Medicare Screening Tests and Risk Assessments:     Yung is here for his Subsequent Wellness visit.     Health Risk Assessment:   Patient rates overall health  as good. Patient feels that their physical health rating is same. Patient is satisfied with their life. Eyesight was rated as same. Hearing was rated as slightly worse. Patient feels that their emotional and mental health rating is slightly worse. Patients states they are never, rarely angry. Patient states they are sometimes unusually tired/fatigued. Pain experienced in the last 7 days has been none. Patient states that he has experienced no weight loss or gain in last 6 months.     Fall Risk Screening:   In the past year, patient has experienced: no history of falling in past year      Home Safety:  Patient has trouble with stairs inside or outside of their home. Patient has working smoke alarms and has working carbon monoxide detector. Home safety hazards include: none.     Nutrition:   Current diet is Regular.     Medications:   Patient is not currently taking any over-the-counter supplements. Patient is able to manage medications.     Activities of Daily Living (ADLs)/Instrumental Activities of Daily Living (IADLs):   Walk and transfer into and out of bed and chair?: Yes  Dress and groom yourself?: No    Bathe or shower yourself?: No    Feed yourself? Yes  Do your laundry/housekeeping?: No  Manage your money, pay your bills and track your expenses?: No  Make your own meals?: No    Do your own shopping?: No    Durable Medical Equipment Suppliers  None    Previous Hospitalizations:   Any hospitalizations or ED visits within the last 12 months?: No      Advance Care Planning:   Living will: Yes    Durable POA for healthcare: Yes    Advanced directive: Yes      PREVENTIVE SCREENINGS      Cardiovascular Screening:    General: Screening Not Indicated and History Lipid Disorder      Diabetes Screening:     General: Screening Current      Colorectal Cancer Screening:     General: Screening Not Indicated      Prostate Cancer Screening:    General: Screening Not Indicated      Lung Cancer Screening:     General:  "Screening Not Indicated      Hepatitis C Screening:    General: Screening Current    Screening, Brief Intervention, and Referral to Treatment (SBIRT)    Screening  Typical number of drinks in a day: 0  Typical number of drinks in a week: 0  Interpretation: Low risk drinking behavior.    AUDIT-C Screenin) How often did you have a drink containing alcohol in the past year? never  2) How many drinks did you have on a typical day when you were drinking in the past year? 0  3) How often did you have 6 or more drinks on one occasion in the past year? never    AUDIT-C Score: 0  Interpretation: Score 0-3 (male): Negative screen for alcohol misuse    Single Item Drug Screening:  How often have you used an illegal drug (including marijuana) or a prescription medication for non-medical reasons in the past year? never    Single Item Drug Screen Score: 0  Interpretation: Negative screen for possible drug use disorder    No results found.     Physical Exam:     /70 (BP Location: Right arm, Patient Position: Sitting, Cuff Size: Standard)   Pulse 70   Temp (!) 95.8 °F (35.4 °C) (Tympanic)   Ht 5' 5.25\" (1.657 m)   Wt 66.8 kg (147 lb 3.2 oz)   SpO2 94%   BMI 24.31 kg/m²     Physical Exam  HENT:      Mouth/Throat:      Mouth: Mucous membranes are moist.   Cardiovascular:      Rate and Rhythm: Normal rate and regular rhythm.      Heart sounds: Murmur (Early systolic) heard.   Pulmonary:      Effort: Pulmonary effort is normal. No respiratory distress.      Breath sounds: Normal breath sounds. No wheezing or rales.   Abdominal:      General: Bowel sounds are normal. There is no distension.      Tenderness: There is abdominal tenderness. There is no guarding.   Musculoskeletal:      Right lower leg: No edema.      Left lower leg: No edema.   Neurological:      Mental Status: He is alert.      Comments: Not engaged in conversation tends to fall asleep quickly   Psychiatric:         Mood and Affect: Mood normal.      "     Yary Bennett MD

## 2024-12-23 ENCOUNTER — TELEPHONE (OUTPATIENT)
Age: 84
End: 2024-12-23

## 2024-12-23 NOTE — TELEPHONE ENCOUNTER
Negrita from the therapy department at [facility] called requesting an order for OT services to please be faxed to: [phone]

## 2024-12-24 DIAGNOSIS — R26.89 BALANCE PROBLEM: Primary | ICD-10-CM

## 2024-12-24 DIAGNOSIS — R26.2 DIFFICULTY WALKING: ICD-10-CM

## 2024-12-24 NOTE — TELEPHONE ENCOUNTER
Faxed over [facility] to [facility]. Also  received transaction report confirmation. Placed in scanning folder

## 2024-12-30 NOTE — TELEPHONE ENCOUNTER
Negrita from [facility] called in regards to needing a referral for OT not Physical therapy. Negrita would like a referral for OT faxed [phone].    Patient on cardiac monitor, automatic blood pressure cuff and pulse oximeter.

## 2024-12-31 DIAGNOSIS — G30.9 ALZHEIMER DISEASE (HCC): Primary | ICD-10-CM

## 2024-12-31 DIAGNOSIS — F02.80 ALZHEIMER DISEASE (HCC): Primary | ICD-10-CM

## 2025-01-08 ENCOUNTER — APPOINTMENT (OUTPATIENT)
Dept: LAB | Age: 85
End: 2025-01-08
Payer: COMMERCIAL

## 2025-01-08 DIAGNOSIS — N18.32 STAGE 3B CHRONIC KIDNEY DISEASE (HCC): ICD-10-CM

## 2025-01-08 DIAGNOSIS — E78.5 HYPERLIPIDEMIA, UNSPECIFIED HYPERLIPIDEMIA TYPE: ICD-10-CM

## 2025-01-08 DIAGNOSIS — N18.30 BENIGN HYPERTENSION WITH CHRONIC KIDNEY DISEASE, STAGE III (HCC): ICD-10-CM

## 2025-01-08 DIAGNOSIS — N13.9 BENIGN LOCALIZED HYPERPLASIA OF PROSTATE WITH URINARY OBSTRUCTION AND LOWER URINARY TRACT SYMPTOMS: ICD-10-CM

## 2025-01-08 DIAGNOSIS — Z13.1 SCREENING FOR DIABETES MELLITUS (DM): ICD-10-CM

## 2025-01-08 DIAGNOSIS — N25.81 SECONDARY HYPERPARATHYROIDISM OF RENAL ORIGIN (HCC): ICD-10-CM

## 2025-01-08 DIAGNOSIS — I12.9 BENIGN HYPERTENSION WITH CHRONIC KIDNEY DISEASE, STAGE III (HCC): ICD-10-CM

## 2025-01-08 DIAGNOSIS — R80.1 PERSISTENT PROTEINURIA: ICD-10-CM

## 2025-01-08 DIAGNOSIS — N40.1 BENIGN LOCALIZED HYPERPLASIA OF PROSTATE WITH URINARY OBSTRUCTION AND LOWER URINARY TRACT SYMPTOMS: ICD-10-CM

## 2025-01-08 LAB
ALBUMIN SERPL BCG-MCNC: 3.9 G/DL (ref 3.5–5)
ALP SERPL-CCNC: 88 U/L (ref 34–104)
ALT SERPL W P-5'-P-CCNC: 19 U/L (ref 7–52)
AMORPH URATE CRY URNS QL MICRO: ABNORMAL
ANION GAP SERPL CALCULATED.3IONS-SCNC: 12 MMOL/L (ref 4–13)
AST SERPL W P-5'-P-CCNC: 25 U/L (ref 13–39)
BACTERIA UR QL AUTO: ABNORMAL /HPF
BASOPHILS # BLD AUTO: 0.05 THOUSANDS/ΜL (ref 0–0.1)
BASOPHILS NFR BLD AUTO: 1 % (ref 0–1)
BILIRUB SERPL-MCNC: 0.43 MG/DL (ref 0.2–1)
BILIRUB UR QL STRIP: NEGATIVE
BUN SERPL-MCNC: 24 MG/DL (ref 5–25)
CALCIUM SERPL-MCNC: 9.4 MG/DL (ref 8.4–10.2)
CHLORIDE SERPL-SCNC: 106 MMOL/L (ref 96–108)
CHOLEST SERPL-MCNC: 189 MG/DL (ref ?–200)
CLARITY UR: CLEAR
CO2 SERPL-SCNC: 26 MMOL/L (ref 21–32)
COLOR UR: YELLOW
CREAT SERPL-MCNC: 1.47 MG/DL (ref 0.6–1.3)
CREAT UR-MCNC: 220.9 MG/DL
EOSINOPHIL # BLD AUTO: 0.3 THOUSAND/ΜL (ref 0–0.61)
EOSINOPHIL NFR BLD AUTO: 4 % (ref 0–6)
ERYTHROCYTE [DISTWIDTH] IN BLOOD BY AUTOMATED COUNT: 14.5 % (ref 11.6–15.1)
EST. AVERAGE GLUCOSE BLD GHB EST-MCNC: 117 MG/DL
GFR SERPL CREATININE-BSD FRML MDRD: 42 ML/MIN/1.73SQ M
GLUCOSE P FAST SERPL-MCNC: 96 MG/DL (ref 65–99)
GLUCOSE UR STRIP-MCNC: NEGATIVE MG/DL
HBA1C MFR BLD: 5.7 %
HCT VFR BLD AUTO: 47.4 % (ref 36.5–49.3)
HDLC SERPL-MCNC: 38 MG/DL
HGB BLD-MCNC: 15.8 G/DL (ref 12–17)
HGB UR QL STRIP.AUTO: ABNORMAL
IMM GRANULOCYTES # BLD AUTO: 0.03 THOUSAND/UL (ref 0–0.2)
IMM GRANULOCYTES NFR BLD AUTO: 0 % (ref 0–2)
KETONES UR STRIP-MCNC: NEGATIVE MG/DL
LDLC SERPL CALC-MCNC: 116 MG/DL (ref 0–100)
LEUKOCYTE ESTERASE UR QL STRIP: NEGATIVE
LYMPHOCYTES # BLD AUTO: 2.4 THOUSANDS/ΜL (ref 0.6–4.47)
LYMPHOCYTES NFR BLD AUTO: 30 % (ref 14–44)
MCH RBC QN AUTO: 31.7 PG (ref 26.8–34.3)
MCHC RBC AUTO-ENTMCNC: 33.3 G/DL (ref 31.4–37.4)
MCV RBC AUTO: 95 FL (ref 82–98)
MONOCYTES # BLD AUTO: 0.93 THOUSAND/ΜL (ref 0.17–1.22)
MONOCYTES NFR BLD AUTO: 11 % (ref 4–12)
MUCOUS THREADS UR QL AUTO: ABNORMAL
NEUTROPHILS # BLD AUTO: 4.42 THOUSANDS/ΜL (ref 1.85–7.62)
NEUTS SEG NFR BLD AUTO: 54 % (ref 43–75)
NITRITE UR QL STRIP: NEGATIVE
NON-SQ EPI CELLS URNS QL MICRO: ABNORMAL /HPF
NRBC BLD AUTO-RTO: 0 /100 WBCS
PH UR STRIP.AUTO: 5.5 [PH]
PHOSPHATE SERPL-MCNC: 3.7 MG/DL (ref 2.3–4.1)
PLATELET # BLD AUTO: 163 THOUSANDS/UL (ref 149–390)
PMV BLD AUTO: 11.3 FL (ref 8.9–12.7)
POTASSIUM SERPL-SCNC: 4.2 MMOL/L (ref 3.5–5.3)
PROT SERPL-MCNC: 7 G/DL (ref 6.4–8.4)
PROT UR STRIP-MCNC: ABNORMAL MG/DL
PROT UR-MCNC: 78.8 MG/DL
PROT/CREAT UR: 0.4 MG/G{CREAT} (ref 0–0.1)
PTH-INTACT SERPL-MCNC: 79.6 PG/ML (ref 12–88)
RBC # BLD AUTO: 4.98 MILLION/UL (ref 3.88–5.62)
RBC #/AREA URNS AUTO: ABNORMAL /HPF
SODIUM SERPL-SCNC: 144 MMOL/L (ref 135–147)
SP GR UR STRIP.AUTO: 1.02 (ref 1–1.03)
T4 FREE SERPL-MCNC: 0.88 NG/DL (ref 0.61–1.12)
TRIGL SERPL-MCNC: 173 MG/DL (ref ?–150)
TSH SERPL DL<=0.05 MIU/L-ACNC: 4.7 UIU/ML (ref 0.45–4.5)
UROBILINOGEN UR STRIP-ACNC: <2 MG/DL
WBC # BLD AUTO: 8.13 THOUSAND/UL (ref 4.31–10.16)
WBC #/AREA URNS AUTO: ABNORMAL /HPF

## 2025-01-08 PROCEDURE — 84156 ASSAY OF PROTEIN URINE: CPT

## 2025-01-08 PROCEDURE — 84100 ASSAY OF PHOSPHORUS: CPT

## 2025-01-08 PROCEDURE — 80061 LIPID PANEL: CPT

## 2025-01-08 PROCEDURE — 81001 URINALYSIS AUTO W/SCOPE: CPT

## 2025-01-08 PROCEDURE — 82570 ASSAY OF URINE CREATININE: CPT

## 2025-01-08 PROCEDURE — 83036 HEMOGLOBIN GLYCOSYLATED A1C: CPT

## 2025-01-08 PROCEDURE — 83970 ASSAY OF PARATHORMONE: CPT

## 2025-01-09 ENCOUNTER — RESULTS FOLLOW-UP (OUTPATIENT)
Dept: OTHER | Facility: HOSPITAL | Age: 85
End: 2025-01-09

## 2025-01-09 DIAGNOSIS — N18.32 STAGE 3B CHRONIC KIDNEY DISEASE (HCC): Primary | Chronic | ICD-10-CM

## 2025-01-09 NOTE — RESULT ENCOUNTER NOTE
Please inform patient that renal function is stable at creatinine 1.47 mg/dL, electrolytes are stable.  Urine protein is stable.  Please order for repeat BMP for chronic kidney disease stage IIIb to be done before office visit in March.  Thank you

## 2025-01-09 NOTE — TELEPHONE ENCOUNTER
----- Message from Cliff Fritz MD sent at 1/9/2025  7:59 AM EST -----  Please inform patient that renal function is stable at creatinine 1.47 mg/dL, electrolytes are stable.  Urine protein is stable.  Please order for repeat BMP for chronic kidney disease stage IIIb to be done before office visit in March.  Thank you

## 2025-01-09 NOTE — TELEPHONE ENCOUNTER
I spoke to patients wife she is aware that blood work is stable. No medication changes at this time.   BMP due in march prior to his follow up pt wife aware and will remind him to complete then.

## 2025-01-10 LAB
DME PARACHUTE DELIVERY DATE REQUESTED: NORMAL
DME PARACHUTE ITEM DESCRIPTION: NORMAL
DME PARACHUTE ORDER STATUS: NORMAL
DME PARACHUTE SUPPLIER NAME: NORMAL
DME PARACHUTE SUPPLIER PHONE: NORMAL

## 2025-01-14 ENCOUNTER — TELEPHONE (OUTPATIENT)
Age: 85
End: 2025-01-14

## 2025-02-11 ENCOUNTER — HOSPITAL ENCOUNTER (EMERGENCY)
Facility: HOSPITAL | Age: 85
Discharge: HOME | End: 2025-02-11
Attending: EMERGENCY MEDICINE
Payer: COMMERCIAL

## 2025-02-11 VITALS
SYSTOLIC BLOOD PRESSURE: 167 MMHG | HEART RATE: 60 BPM | DIASTOLIC BLOOD PRESSURE: 72 MMHG | OXYGEN SATURATION: 95 % | RESPIRATION RATE: 17 BRPM | TEMPERATURE: 97.5 F

## 2025-02-11 DIAGNOSIS — F03.90 DEMENTIA (HCC): Primary | ICD-10-CM

## 2025-02-11 LAB
ALBUMIN SERPL BCG-MCNC: 3.8 G/DL (ref 3.5–5)
ALP SERPL-CCNC: 90 U/L (ref 34–104)
ALT SERPL W P-5'-P-CCNC: 13 U/L (ref 7–52)
ANION GAP SERPL CALCULATED.3IONS-SCNC: 9 MMOL/L (ref 4–13)
AST SERPL W P-5'-P-CCNC: 17 U/L (ref 13–39)
ATRIAL RATE: 66 BPM
BASOPHILS # BLD AUTO: 0.06 THOUSANDS/ÂΜL (ref 0–0.1)
BASOPHILS NFR BLD AUTO: 1 % (ref 0–1)
BILIRUB SERPL-MCNC: 1.04 MG/DL (ref 0.2–1)
BUN SERPL-MCNC: 23 MG/DL (ref 5–25)
CALCIUM SERPL-MCNC: 9.4 MG/DL (ref 8.4–10.2)
CHLORIDE SERPL-SCNC: 107 MMOL/L (ref 96–108)
CO2 SERPL-SCNC: 26 MMOL/L (ref 21–32)
CREAT SERPL-MCNC: 1.56 MG/DL (ref 0.6–1.3)
EOSINOPHIL # BLD AUTO: 0.14 THOUSAND/ÂΜL (ref 0–0.61)
EOSINOPHIL NFR BLD AUTO: 1 % (ref 0–6)
ERYTHROCYTE [DISTWIDTH] IN BLOOD BY AUTOMATED COUNT: 14.7 % (ref 11.6–15.1)
GFR SERPL CREATININE-BSD FRML MDRD: 39 ML/MIN/1.73SQ M
GLUCOSE SERPL-MCNC: 102 MG/DL (ref 65–140)
HCT VFR BLD AUTO: 46.3 % (ref 36.5–49.3)
HGB BLD-MCNC: 15.4 G/DL (ref 12–17)
IMM GRANULOCYTES # BLD AUTO: 0.06 THOUSAND/UL (ref 0–0.2)
IMM GRANULOCYTES NFR BLD AUTO: 1 % (ref 0–2)
LYMPHOCYTES # BLD AUTO: 2.04 THOUSANDS/ÂΜL (ref 0.6–4.47)
LYMPHOCYTES NFR BLD AUTO: 19 % (ref 14–44)
MAGNESIUM SERPL-MCNC: 2.1 MG/DL (ref 1.9–2.7)
MCH RBC QN AUTO: 31.4 PG (ref 26.8–34.3)
MCHC RBC AUTO-ENTMCNC: 33.3 G/DL (ref 31.4–37.4)
MCV RBC AUTO: 95 FL (ref 82–98)
MONOCYTES # BLD AUTO: 1.62 THOUSAND/ÂΜL (ref 0.17–1.22)
MONOCYTES NFR BLD AUTO: 15 % (ref 4–12)
NEUTROPHILS # BLD AUTO: 6.77 THOUSANDS/ÂΜL (ref 1.85–7.62)
NEUTS SEG NFR BLD AUTO: 63 % (ref 43–75)
NRBC BLD AUTO-RTO: 0 /100 WBCS
P AXIS: 83 DEGREES
PHOSPHATE SERPL-MCNC: 3.3 MG/DL (ref 2.3–4.1)
PLATELET # BLD AUTO: 137 THOUSANDS/UL (ref 149–390)
PMV BLD AUTO: 10.7 FL (ref 8.9–12.7)
POTASSIUM SERPL-SCNC: 3.9 MMOL/L (ref 3.5–5.3)
PR INTERVAL: 208 MS
PROT SERPL-MCNC: 7.1 G/DL (ref 6.4–8.4)
QRS AXIS: 71 DEGREES
QRSD INTERVAL: 80 MS
QT INTERVAL: 410 MS
QTC INTERVAL: 429 MS
RBC # BLD AUTO: 4.9 MILLION/UL (ref 3.88–5.62)
SODIUM SERPL-SCNC: 142 MMOL/L (ref 135–147)
T WAVE AXIS: 54 DEGREES
VENTRICULAR RATE: 66 BPM
WBC # BLD AUTO: 10.69 THOUSAND/UL (ref 4.31–10.16)

## 2025-02-11 PROCEDURE — 93005 ELECTROCARDIOGRAM TRACING: CPT

## 2025-02-11 PROCEDURE — 36415 COLL VENOUS BLD VENIPUNCTURE: CPT

## 2025-02-11 PROCEDURE — 93010 ELECTROCARDIOGRAM REPORT: CPT | Performed by: INTERNAL MEDICINE

## 2025-02-11 PROCEDURE — 83735 ASSAY OF MAGNESIUM: CPT

## 2025-02-11 PROCEDURE — 84100 ASSAY OF PHOSPHORUS: CPT

## 2025-02-11 PROCEDURE — 99284 EMERGENCY DEPT VISIT MOD MDM: CPT

## 2025-02-11 PROCEDURE — 80053 COMPREHEN METABOLIC PANEL: CPT

## 2025-02-11 PROCEDURE — 99284 EMERGENCY DEPT VISIT MOD MDM: CPT | Performed by: EMERGENCY MEDICINE

## 2025-02-11 PROCEDURE — 85025 COMPLETE CBC W/AUTO DIFF WBC: CPT

## 2025-02-11 NOTE — CASE MANAGEMENT
Case Management Progress Note    Patient name Yung Concepcion  Location ED 20/ED 20 MRN [id number]  : 1940 Date 2025       LOS (days): 0  Geometric Mean LOS (GMLOS) (days):   Days to GMLOS:        OBJECTIVE:        Current admission status: Emergency  Preferred Pharmacy:   [facility]/pharmacy #[id number] - [place], [place] - [address]  [address]  [place] [address]  Phone: [phone] Fax: [phone]    Primary Care Provider: Yary Bennett MD    Primary Insurance: [employer]  Secondary Insurance:     PROGRESS NOTE:      Cm met with pt, his wife, and his dtr, to discuss d/c planning  Pt resides at [facility], in their IP Living portion  Pt's wife and dtr express that the pt's declined in the last 2 weeks. They were concerns about next steps, and where the pt will be able to d/c  After multiple options reviewed, the decision was made to transition the pt to Respite Care at [facility]'s fully skilled portion  Pt will go there for a few days, while the pt's wife coordinates with [facility] about a longer admission into their skilled portion  Pt's family, as well as DON of [facility], are in agreement  Pt will d/c there today, some time after 1730

## 2025-02-11 NOTE — ED NOTES
Regine navarro/andrew for 0625 this evening. Family at bedside made aware      Davida Sawant RN  02/11/25 [id number]

## 2025-02-11 NOTE — PROGRESS NOTES
Patient:    MRN:  [id number]    [facility] Request ID:  [id number]    Level of care reserved:  Skilled Nursing Facility    Partner Reserved:  [address], [place] [address] [phone]    Clinical needs requested:    Geography searched:  10 miles around [id number]    Start of Service:    Request sent:  2:22pm EST on 2/11/2025 by Nakul Irving    Partner reserved:  3:02pm EST on 2/11/2025 by Nakul Irving    Choice list shared:

## 2025-02-11 NOTE — DISCHARGE INSTRUCTIONS
You were seen today for slowly declining dementia requesting upgraded level of care. You were upgraded to a higher level of care at [facility]. Return to the ER if you have any new or worsening symptoms.

## 2025-02-12 NOTE — ED ATTENDING ATTESTATION
2/11/2025  I, Travon Nolasco DO, saw and evaluated the patient. I have discussed the patient with the resident/non-physician practitioner and agree with the resident's/non-physician practitioner's findings, Plan of Care, and MDM as documented in the resident's/non-physician practitioner's note, except where noted. All available labs and Radiology studies were reviewed.  I was present for key portions of any procedure(s) performed by the resident/non-physician practitioner and I was immediately available to provide assistance.       At this point I agree with the current assessment done in the Emergency Department.  I have conducted an independent evaluation of this patient a history and physical is as follows:    84-year-old male, presents for worsening dementia cannot be taking care of and current living situation wife is unable to care for him.  Would like placement.  Patient has no complaints is comfortable appearing happy no evidence of any trauma well cared for we will plan to admit for placement or if can move to assisted living or higher level of care will attempt that    ED Course         Critical Care Time  Procedures

## 2025-02-13 ENCOUNTER — APPOINTMENT (EMERGENCY)
Dept: RADIOLOGY | Facility: HOSPITAL | Age: 85
End: 2025-02-13
Payer: COMMERCIAL

## 2025-02-13 ENCOUNTER — HOSPITAL ENCOUNTER (OUTPATIENT)
Facility: HOSPITAL | Age: 85
Setting detail: OBSERVATION
Discharge: HOME | End: 2025-02-14
Attending: EMERGENCY MEDICINE | Admitting: STUDENT IN AN ORGANIZED HEALTH CARE EDUCATION/TRAINING PROGRAM
Payer: COMMERCIAL

## 2025-02-13 DIAGNOSIS — I25.10 CAD (CORONARY ARTERY DISEASE): ICD-10-CM

## 2025-02-13 DIAGNOSIS — R55 SYNCOPE: Primary | ICD-10-CM

## 2025-02-13 DIAGNOSIS — R79.89 ELEVATED TROPONIN: ICD-10-CM

## 2025-02-13 DIAGNOSIS — I10 HTN (HYPERTENSION): ICD-10-CM

## 2025-02-13 DIAGNOSIS — F03.90 DEMENTIA (HCC): ICD-10-CM

## 2025-02-13 DIAGNOSIS — I12.9 BENIGN HYPERTENSION WITH CHRONIC KIDNEY DISEASE, STAGE III (HCC): ICD-10-CM

## 2025-02-13 DIAGNOSIS — N18.30 BENIGN HYPERTENSION WITH CHRONIC KIDNEY DISEASE, STAGE III (HCC): ICD-10-CM

## 2025-02-13 LAB
2HR DELTA HS TROPONIN: 41 NG/L
4HR DELTA HS TROPONIN: 100 NG/L
ALBUMIN SERPL BCG-MCNC: 4.2 G/DL (ref 3.5–5)
ALP SERPL-CCNC: 98 U/L (ref 34–104)
ALT SERPL W P-5'-P-CCNC: 21 U/L (ref 7–52)
ANION GAP SERPL CALCULATED.3IONS-SCNC: 11 MMOL/L (ref 4–13)
AST SERPL W P-5'-P-CCNC: 37 U/L (ref 13–39)
BASOPHILS # BLD AUTO: 0.05 THOUSANDS/ÂΜL (ref 0–0.1)
BASOPHILS NFR BLD AUTO: 0 % (ref 0–1)
BILIRUB SERPL-MCNC: 0.67 MG/DL (ref 0.2–1)
BUN SERPL-MCNC: 27 MG/DL (ref 5–25)
CALCIUM SERPL-MCNC: 9.4 MG/DL (ref 8.4–10.2)
CARDIAC TROPONIN I PNL SERPL HS: 135 NG/L (ref ?–50)
CARDIAC TROPONIN I PNL SERPL HS: 35 NG/L (ref ?–50)
CARDIAC TROPONIN I PNL SERPL HS: 76 NG/L (ref ?–50)
CHLORIDE SERPL-SCNC: 106 MMOL/L (ref 96–108)
CO2 SERPL-SCNC: 25 MMOL/L (ref 21–32)
CREAT SERPL-MCNC: 1.59 MG/DL (ref 0.6–1.3)
EOSINOPHIL # BLD AUTO: 0.22 THOUSAND/ÂΜL (ref 0–0.61)
EOSINOPHIL NFR BLD AUTO: 2 % (ref 0–6)
ERYTHROCYTE [DISTWIDTH] IN BLOOD BY AUTOMATED COUNT: 14.8 % (ref 11.6–15.1)
GFR SERPL CREATININE-BSD FRML MDRD: 39 ML/MIN/1.73SQ M
GLUCOSE SERPL-MCNC: 140 MG/DL (ref 65–140)
HCT VFR BLD AUTO: 48.9 % (ref 36.5–49.3)
HGB BLD-MCNC: 15.9 G/DL (ref 12–17)
IMM GRANULOCYTES # BLD AUTO: 0.12 THOUSAND/UL (ref 0–0.2)
IMM GRANULOCYTES NFR BLD AUTO: 1 % (ref 0–2)
LYMPHOCYTES # BLD AUTO: 2.18 THOUSANDS/ÂΜL (ref 0.6–4.47)
LYMPHOCYTES NFR BLD AUTO: 16 % (ref 14–44)
MCH RBC QN AUTO: 31.4 PG (ref 26.8–34.3)
MCHC RBC AUTO-ENTMCNC: 32.5 G/DL (ref 31.4–37.4)
MCV RBC AUTO: 97 FL (ref 82–98)
MONOCYTES # BLD AUTO: 1.67 THOUSAND/ÂΜL (ref 0.17–1.22)
MONOCYTES NFR BLD AUTO: 12 % (ref 4–12)
NEUTROPHILS # BLD AUTO: 9.71 THOUSANDS/ÂΜL (ref 1.85–7.62)
NEUTS SEG NFR BLD AUTO: 69 % (ref 43–75)
NRBC BLD AUTO-RTO: 0 /100 WBCS
PLATELET # BLD AUTO: 166 THOUSANDS/UL (ref 149–390)
PMV BLD AUTO: 11.3 FL (ref 8.9–12.7)
POTASSIUM SERPL-SCNC: 4.6 MMOL/L (ref 3.5–5.3)
PROT SERPL-MCNC: 7.8 G/DL (ref 6.4–8.4)
RBC # BLD AUTO: 5.06 MILLION/UL (ref 3.88–5.62)
SODIUM SERPL-SCNC: 142 MMOL/L (ref 135–147)
WBC # BLD AUTO: 13.95 THOUSAND/UL (ref 4.31–10.16)

## 2025-02-13 PROCEDURE — 36415 COLL VENOUS BLD VENIPUNCTURE: CPT

## 2025-02-13 PROCEDURE — 99285 EMERGENCY DEPT VISIT HI MDM: CPT

## 2025-02-13 PROCEDURE — 99223 1ST HOSP IP/OBS HIGH 75: CPT | Performed by: STUDENT IN AN ORGANIZED HEALTH CARE EDUCATION/TRAINING PROGRAM

## 2025-02-13 PROCEDURE — 85025 COMPLETE CBC W/AUTO DIFF WBC: CPT

## 2025-02-13 PROCEDURE — 84484 ASSAY OF TROPONIN QUANT: CPT

## 2025-02-13 PROCEDURE — 96360 HYDRATION IV INFUSION INIT: CPT

## 2025-02-13 PROCEDURE — 99285 EMERGENCY DEPT VISIT HI MDM: CPT | Performed by: EMERGENCY MEDICINE

## 2025-02-13 PROCEDURE — 93005 ELECTROCARDIOGRAM TRACING: CPT

## 2025-02-13 PROCEDURE — 80053 COMPREHEN METABOLIC PANEL: CPT

## 2025-02-13 PROCEDURE — 84439 ASSAY OF FREE THYROXINE: CPT | Performed by: EMERGENCY MEDICINE

## 2025-02-13 PROCEDURE — 70450 CT HEAD/BRAIN W/O DYE: CPT

## 2025-02-13 PROCEDURE — 96361 HYDRATE IV INFUSION ADD-ON: CPT

## 2025-02-13 PROCEDURE — 84443 ASSAY THYROID STIM HORMONE: CPT | Performed by: EMERGENCY MEDICINE

## 2025-02-13 PROCEDURE — 71045 X-RAY EXAM CHEST 1 VIEW: CPT

## 2025-02-13 RX ORDER — HEPARIN SODIUM 5000 [USP'U]/ML
5000 INJECTION, SOLUTION INTRAVENOUS; SUBCUTANEOUS EVERY 8 HOURS SCHEDULED
Status: DISCONTINUED | OUTPATIENT
Start: 2025-02-13 | End: 2025-02-14 | Stop reason: HOSPADM

## 2025-02-13 RX ORDER — LOSARTAN POTASSIUM 50 MG/1
50 TABLET ORAL DAILY
Status: DISCONTINUED | OUTPATIENT
Start: 2025-02-14 | End: 2025-02-14

## 2025-02-13 RX ORDER — PRAVASTATIN SODIUM 80 MG/1
80 TABLET ORAL
Status: DISCONTINUED | OUTPATIENT
Start: 2025-02-14 | End: 2025-02-14 | Stop reason: HOSPADM

## 2025-02-13 RX ORDER — SODIUM CHLORIDE, SODIUM GLUCONATE, SODIUM ACETATE, POTASSIUM CHLORIDE, MAGNESIUM CHLORIDE, SODIUM PHOSPHATE, DIBASIC, AND POTASSIUM PHOSPHATE .53; .5; .37; .037; .03; .012; .00082 G/100ML; G/100ML; G/100ML; G/100ML; G/100ML; G/100ML; G/100ML
100 INJECTION, SOLUTION INTRAVENOUS CONTINUOUS
Status: DISCONTINUED | OUTPATIENT
Start: 2025-02-13 | End: 2025-02-14

## 2025-02-13 RX ORDER — DONEPEZIL HYDROCHLORIDE 10 MG/1
10 TABLET, FILM COATED ORAL EVERY MORNING
Status: DISCONTINUED | OUTPATIENT
Start: 2025-02-14 | End: 2025-02-14 | Stop reason: HOSPADM

## 2025-02-13 RX ORDER — ASPIRIN 325 MG
325 TABLET ORAL ONCE
Status: COMPLETED | OUTPATIENT
Start: 2025-02-13 | End: 2025-02-14

## 2025-02-13 RX ORDER — MEMANTINE HYDROCHLORIDE 10 MG/1
10 TABLET ORAL DAILY
Status: DISCONTINUED | OUTPATIENT
Start: 2025-02-14 | End: 2025-02-14 | Stop reason: HOSPADM

## 2025-02-13 RX ORDER — ASPIRIN 81 MG/1
81 TABLET, CHEWABLE ORAL DAILY
Status: DISCONTINUED | OUTPATIENT
Start: 2025-02-14 | End: 2025-02-14 | Stop reason: HOSPADM

## 2025-02-13 RX ORDER — ASPIRIN 325 MG
325 TABLET ORAL DAILY
Status: DISCONTINUED | OUTPATIENT
Start: 2025-02-14 | End: 2025-02-13

## 2025-02-13 RX ADMIN — SODIUM CHLORIDE 1000 ML: 0.9 INJECTION, SOLUTION INTRAVENOUS at 17:42

## 2025-02-13 NOTE — ED ATTENDING ATTESTATION
I saw and evaluated the patient. I have discussed the patient with the resident physician and agree with the resident's findings, assessment and plan as documented in the resident physician's note, unless otherwise documented below. All available laboratory and imaging studies were reviewed by myself.  I was present for key portions of any procedure(s) performed by the resident and I was immediately available to provide assistance.     I agree with the current assessment done in the Emergency Department. I have conducted an independent evaluation of this patient    Final Diagnosis:  1. Syncope    2. Dementia (HCC)    3. HTN (hypertension)    4. CAD (coronary artery disease)            Chief Complaint   Patient presents with    Syncope     Per EMS pt was given nitro for back pain at [facility]. Pt then tried to ambulate to the bathroom felt dizzy, sat down and had a syncopal episode. Pt has hx of dementia. No complaints at this time.      This is a 84 y.o. male with a history of CAD, HTN, BPH, presenting for evaluation of syncope. Patient was at nursing home when he started to complain of back pain. He then sat down and had syncopal episode. No head strike. Nurse gave him nitroglycerine after event. No ongoing pain. History limited secondary to dementia.     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    PSH:   has a past surgical history that includes Inguinal hernia repair; Colonoscopy; Tonsillectomy; Skin biopsy; Mohs surgery; and Sinus surgery (Right, 09/29/2022).    Social:  Social History     Substance and Sexual Activity   Alcohol Use No     Social History     Tobacco Use   Smoking Status Never   Smokeless Tobacco Never     Social History     Substance and Sexual Activity   Drug Use No     PE:  Vitals:    02/13/25 1729 02/13/25 1900 02/13/25 2000  02/13/25 2100   BP:  107/61 119/68 117/75   BP Location:  Right arm Right arm Right arm   Pulse:  88 76 72   Resp:  17 21 21   Temp:       TempSrc:       SpO2: 97% 97% 97%        Physical exam:  GENERAL APPEARANCE: Appears comfortable, no acute distress, calm and cooperative   NEURO: Oriented to self only, no gross focal deficits, cranial nerves grossly intact, clear fluent speech, no facial asymmetry   HEENT: Normocephalic, atraumatic, moist mucous membranes   Neck: Supple, full ROM  CV: RRR, no murmurs, rubs, or gallops  LUNGS: CTAB, no wheezing, rales, or rhonchi  GI: Abdomen soft, non-tender, no rebound or guarding   MSK: Extremities non-tender, no pitting edema  SKIN: Warm and dry    Procedure Note: EKG  Date/Time: 02/13/25 9:31 PM   Interpreted by: Katelyn Colón  Indications / Diagnosis: Syncope  ECG reviewed by me, the ED Provider: yes   The EKG demonstrates:  Rate: 112  Rhythm: Tachycardia  Intervals: normal intervals  Axis: normal axis  QRS/Blocks: normal QRS  ST Changes: No acute ST Changes, no STD/ASHKAN.    Assessment and plan: This is a 84 y.o. male with a history of CAD, HTN, BPH, presenting for evaluation of syncope. Within ddx consider orthostatic episode, vasovagal episode, cardiogenic event, metabolic abnormality. Will obtain workup to assess for etiologies.           Code Status: No Order  Advance Directive and Living Will:      Power of :    POLST:      Medications   sodium chloride 0.9 % bolus 1,000 mL (0 mL Intravenous Stopped 2/13/25 2128)     CT head wo contrast   Final Result      1.  No acute intracranial abnormality.   2.  Chronic diffuse parenchymal volume loss.   3.  Mild chronic microangiopathic changes.                  Workstation performed: [id number]         XR chest 1 view portable    (Results Pending)     Orders Placed This Encounter   Procedures    XR chest 1 view portable    CT head wo contrast    CBC and differential    Comprehensive metabolic panel    HS Troponin 0hr  (reflex protocol)    TSH, 3rd generation with Free T4 reflex    HS Troponin I 2hr    HS Troponin I 4hr    Continuous Pulse Oximetry    Continuous cardiac monitoring    24 Hour Telemetry Monitoring    ECG 12 lead    ECG 12 lead    Place in Observation     Labs Reviewed   CBC AND DIFFERENTIAL - Abnormal       Result Value Ref Range Status    WBC 13.95 (*) 4.31 - 10.16 Thousand/uL Final    RBC 5.06  3.88 - 5.62 Million/uL Final    Hemoglobin 15.9  12.0 - 17.0 g/dL Final    Hematocrit 48.9  36.5 - 49.3 % Final    MCV 97  82 - 98 fL Final    MCH 31.4  26.8 - 34.3 pg Final    MCHC 32.5  31.4 - 37.4 g/dL Final    RDW 14.8  11.6 - 15.1 % Final    MPV 11.3  8.9 - 12.7 fL Final    Platelets 166  149 - 390 Thousands/uL Final    nRBC 0  /100 WBCs Final    Segmented % 69  43 - 75 % Final    Immature Grans % 1  0 - 2 % Final    Lymphocytes % 16  14 - 44 % Final    Monocytes % 12  4 - 12 % Final    Eosinophils Relative 2  0 - 6 % Final    Basophils Relative 0  0 - 1 % Final    Absolute Neutrophils 9.71 (*) 1.85 - 7.62 Thousands/µL Final    Absolute Immature Grans 0.12  0.00 - 0.20 Thousand/uL Final    Absolute Lymphocytes 2.18  0.60 - 4.47 Thousands/µL Final    Absolute Monocytes 1.67 (*) 0.17 - 1.22 Thousand/µL Final    Eosinophils Absolute 0.22  0.00 - 0.61 Thousand/µL Final    Basophils Absolute 0.05  0.00 - 0.10 Thousands/µL Final   COMPREHENSIVE METABOLIC PANEL - Abnormal    Sodium 142  135 - 147 mmol/L Final    Potassium 4.6  3.5 - 5.3 mmol/L Final    Comment: Slightly Hemolyzed:Results may be affected.    Chloride 106  96 - 108 mmol/L Final    CO2 25  21 - 32 mmol/L Final    ANION GAP 11  4 - 13 mmol/L Final    BUN 27 (*) 5 - 25 mg/dL Final    Creatinine 1.59 (*) 0.60 - 1.30 mg/dL Final    Comment: Standardized to IDMS reference method    Glucose 140  65 - 140 mg/dL Final    Comment: If the patient is fasting, the ADA then defines impaired fasting glucose as > 100 mg/dL and diabetes as > or equal to 123 mg/dL.    Calcium  "9.4  8.4 - 10.2 mg/dL Final    AST 37  13 - 39 U/L Final    Comment: Slightly Hemolyzed:Results may be affected.    ALT 21  7 - 52 U/L Final    Comment: Specimen collection should occur prior to Sulfasalazine administration due to the potential for falsely depressed results.     Alkaline Phosphatase 98  34 - 104 U/L Final    Total Protein 7.8  6.4 - 8.4 g/dL Final    Albumin 4.2  3.5 - 5.0 g/dL Final    Total Bilirubin 0.67  0.20 - 1.00 mg/dL Final    Comment: Use of this assay is not recommended for patients undergoing treatment with eltrombopag due to the potential for falsely elevated results.  N-acetyl-p-benzoquinone imine (metabolite of Acetaminophen) will generate erroneously low results in samples for patients that have taken an overdose of Acetaminophen.    eGFR 39  ml/min/1.73sq m Final    Narrative:     National Kidney Disease Foundation guidelines for Chronic Kidney Disease (CKD):     Stage 1 with normal or high GFR (GFR > 90 mL/min/1.73 square meters)    Stage 2 Mild CKD (GFR = 60-89 mL/min/1.73 square meters)    Stage 3A Moderate CKD (GFR = 45-59 mL/min/1.73 square meters)    Stage 3B Moderate CKD (GFR = 30-44 mL/min/1.73 square meters)    Stage 4 Severe CKD (GFR = 15-29 mL/min/1.73 square meters)    Stage 5 End Stage CKD (GFR <15 mL/min/1.73 square meters)  Note: GFR calculation is accurate only with a steady state creatinine   HS TROPONIN I 2HR - Abnormal    hs TnI 2hr 76 (*) \"Refer to ACS Flowchart\"- see link ng/L Final    Comment:                                              Initial (time 0) result  If >=50 ng/L, Myocardial injury suggested ;  Type of myocardial injury and treatment strategy  to be determined.  If 5-49 ng/L, a delta result at 2 hours and or 4 hours will be needed to further evaluate.  If <4 ng/L, and chest pain has been >3 hours since onset, patient may qualify for discharge based on the HEART score in the ED.  If <5 ng/L and <3hours since onset of chest pain, a delta result at 2 " "hours will be needed to further evaluate.    HS Troponin 99th Percentile URL of a Health Population=12 ng/L with a 95% Confidence Interval of 8-18 ng/L.    Second Troponin (time 2 hours)  If calculated delta >= 20 ng/L,  Myocardial injury suggested ; Type of myocardial injury and treatment strategy to be determined.  If 5-49 ng/L and the calculated delta is 5-19 ng/L, consult medical service for evaluation.  Continue evaluation for ischemia on ecg and other possible etiology and repeat hs troponin at 4 hours.  If delta is <5 ng/L at 2 hours, consider discharge based on risk stratification via the HEART score (if in ED), or QUENTIN risk score in IP/Observation.    HS Troponin 99th Percentile URL of a Health Population=12 ng/L with a 95% Confidence Interval of 8-18 ng/L.    Delta 2hr hsTnI 41 (*) <20 ng/L Final   HS TROPONIN I 0HR - Normal    hs TnI 0hr 35  \"Refer to ACS Flowchart\"- see link ng/L Final    Comment:                                              Initial (time 0) result  If >=50 ng/L, Myocardial injury suggested ;  Type of myocardial injury and treatment strategy  to be determined.  If 5-49 ng/L, a delta result at 2 hours and or 4 hours will be needed to further evaluate.  If <4 ng/L, and chest pain has been >3 hours since onset, patient may qualify for discharge based on the HEART score in the ED.  If <5 ng/L and <3hours since onset of chest pain, a delta result at 2 hours will be needed to further evaluate.    HS Troponin 99th Percentile URL of a Health Population=12 ng/L with a 95% Confidence Interval of 8-18 ng/L.    Second Troponin (time 2 hours)  If calculated delta >= 20 ng/L,  Myocardial injury suggested ; Type of myocardial injury and treatment strategy to be determined.  If 5-49 ng/L and the calculated delta is 5-19 ng/L, consult medical service for evaluation.  Continue evaluation for ischemia on ecg and other possible etiology and repeat hs troponin at 4 hours.  If delta is <5 ng/L at 2 hours, " consider discharge based on risk stratification via the HEART score (if in ED), or QUENTIN risk score in IP/Observation.    HS Troponin 99th Percentile URL of a Health Population=12 ng/L with a 95% Confidence Interval of 8-18 ng/L.   TSH, 3RD GENERATION WITH FREE T4 REFLEX   HS TROPONIN I 4HR         Time reflects when diagnosis was documented in both MDM as applicable and the Disposition within this note       Time User Action Codes Description Comment    2/13/2025  8:43 PM Krivchenia, Bryan Add [R55] Syncope     2/13/2025  8:43 PM Krivchenia, Bryan Add [F03.90] Dementia (HCC)     2/13/2025  8:43 PM Krivchenia, Bryan Add [I10] HTN (hypertension)     2/13/2025  8:43 PM Krivchenia, Bryan Add [I25.10] CAD (coronary artery disease)           ED Disposition       ED Disposition   Admit    Condition   Stable    Date/Time   Thu Feb 13, 2025  8:43 PM    Comment   Case was discussed with Dr. Serna and the patient's admission status was agreed to be Admission Status: observation status to the service of Dr. Serna .               Follow-up Information    None       Patient's Medications   Discharge Prescriptions    No medications on file     No discharge procedures on file.  Prior to Admission Medications   Prescriptions Last Dose Informant Patient Reported? Taking?   Memantine HCl ER 14 MG CP24   No No   Sig: Take 1 capsule (14 mg total) by mouth every morning   Multiple Vitamins-Minerals (MULTI-VITAMIN/MINERALS PO)  Spouse/Significant Other Yes No   Sig: Take by mouth   Patient not taking: Reported on 12/19/2024   aspirin (ECOTRIN LOW STRENGTH) 81 mg EC tablet  Spouse/Significant Other Yes No   Sig: Take 81 mg by mouth   Patient not taking: Reported on 12/19/2024   donepezil (ARICEPT) 10 mg tablet   No No   Sig: Take 1 tablet (10 mg total) by mouth every morning   losartan (COZAAR) 50 mg tablet   No No   Sig: TAKE 1 TABLET BY MOUTH EVERY DAY   rosuvastatin (CRESTOR) 10 MG tablet  Spouse/Significant Other Yes No   Sig: Take 10 mg  "by mouth daily      Facility-Administered Medications: None         Portions of the record may have been created with voice recognition software. Occasional wrong word or \"sound a like\" substitutions may have occurred due to the inherent limitations of voice recognition software. Read the chart carefully and recognize, using context, where substitutions have occurred.    Electronically signed by:  Katelyn Colón    "

## 2025-02-13 NOTE — ED PROVIDER NOTES
Time reflects when diagnosis was documented in both MDM as applicable and the Disposition within this note       Time User Action Codes Description Comment    2/11/2025  2:27 PM Abigail Garcia Add [F03.90] Dementia (HCC)           ED Disposition       ED Disposition   Discharge    Condition   Stable    Date/Time   Tue Feb 11, 2025  2:27 PM    Comment   Yung Concepcion discharge to home/self care.                   Assessment & Plan       Medical Decision Making  Amount and/or Complexity of Data Reviewed  Labs: ordered. Decision-making details documented in ED Course.      Patient is a 84 y.o. male  PMH Alzheimer's dementia who presents to the ED from [facility] where he lives with his wife.  Patient is disoriented even to self and is does not know his own name.  Wife typically is his caregiver but the level of assistance her  needs has begun to be overwhelming to her.  She is in search of an upgraded level of care for him.  There are no other complaints at this time.  And has had a slow, consistent decline in mental status.  There were no acute changes..    Vital signs stable, afebrile. Exam as listed below.    Differential diagnosis includes but is not limited to -need for upgraded level of care.  No evidence of other medical issues but will screen with basic labs to assess for possible infection or malnutrition.    Plan CBC, CMP, magnesium, phosphorus.  Will reach out to ER  to see if patient can be upgraded from the ER to the level of care and avoid admission.    View ED course above for further discussion on patient workup.     All labs reviewed and utilized in the medical decision making process  All radiology studies independently viewed by me and interpreted by the radiologist.  I reviewed all testing with the patient.     Upon re-evaluation management able to upgrade patient's level of care at [facility].  He will be discharged from the ER back to [facility] but will be in  an assisted living..      ED Course as of 02/13/25 1500   Tue Feb 11, 2025   1313 Phosphorus   1313 Magnesium   1313 Comprehensive metabolic panel(!)   1313 CBC and differential(!)       Medications - No data to display    ED Risk Strat Scores                                              History of Present Illness       Chief Complaint   Patient presents with    Failure To Thrive     Lives at [facility]. Pt has progressing dementia. Wife reports pt is no longer eating, able to ambulate or able to complete his daily task. She is unable to care for him.        Past Medical History:   Diagnosis Date    Basal cell carcinoma     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     Coronary artery disease     CAD s/p stent 1995    Elevated PSA     R....11/16/17    Hypertension     Wears glasses       Past Surgical History:   Procedure Laterality Date    COLONOSCOPY      INGUINAL HERNIA REPAIR      MOHS SURGERY      SINUS SURGERY Right 09/29/2022    SKIN BIOPSY      basal cell     TONSILLECTOMY        Family History   Problem Relation Age of Onset    COPD Mother     Heart failure Mother     Heart attack Father     Lung cancer Brother     No Known Problems Son     No Known Problems Daughter     No Known Problems Daughter       Social History     Tobacco Use    Smoking status: Never    Smokeless tobacco: Never   Vaping Use    Vaping status: Never Used   Substance Use Topics    Alcohol use: No    Drug use: No      E-Cigarette/Vaping    E-Cigarette Use Never User       E-Cigarette/Vaping Substances    Nicotine No     THC No     CBD No     Flavoring No     Other No     Unknown No       I have reviewed and agree with the history as documented.     84-year-old male presenting for request for upgraded level of care        Review of Systems   Constitutional:  Negative for appetite change, chills and fever.   HENT:  Negative for congestion and rhinorrhea.    Respiratory:  Negative for cough and shortness of  breath.    Gastrointestinal:  Negative for abdominal distention, nausea and vomiting.   Skin:  Negative for color change and rash.   Psychiatric/Behavioral:  Positive for confusion. Negative for agitation.            Objective       ED Triage Vitals [02/11/25 1201]   Temperature Pulse Blood Pressure Respirations SpO2 Patient Position - Orthostatic VS   97.5 °F (36.4 °C) 70 (!) 179/96 18 94 % --      Temp Source Heart Rate Source BP Location FiO2 (%) Pain Score    Tympanic Monitor -- -- --      Vitals      Date and Time Temp Pulse SpO2 Resp BP Pain Score FACES Pain Rating User   02/11/25 1400 -- 60 -- 17 167/72 -- --    02/11/25 1300 -- 64 95 % 20 140/88 -- -- JR   02/11/25 1201 97.5 °F (36.4 °C) 70 94 % 18 179/96 -- --             Physical Exam  Constitutional:       General: He is not in acute distress.     Appearance: Normal appearance. He is not ill-appearing.   HENT:      Head: Normocephalic and atraumatic.      Nose: Nose normal.   Eyes:      Pupils: Pupils are equal, round, and reactive to light.   Cardiovascular:      Rate and Rhythm: Normal rate.   Pulmonary:      Effort: Pulmonary effort is normal.   Abdominal:      General: Abdomen is flat. There is no distension.      Palpations: Abdomen is soft.      Tenderness: There is no abdominal tenderness. There is no guarding.   Musculoskeletal:      Right lower leg: No edema.      Left lower leg: No edema.   Skin:     General: Skin is warm and dry.      Capillary Refill: Capillary refill takes less than 2 seconds.   Neurological:      General: No focal deficit present.      Mental Status: He is alert.      Cranial Nerves: No cranial nerve deficit.      Motor: No weakness.      Comments: Disoriented         Results Reviewed       Procedure Component Value Units Date/Time    Comprehensive metabolic panel [[id number]]  (Abnormal) Collected: 02/11/25 1228    Lab Status: Final result Specimen: Blood from Arm, Left Updated: 02/11/25 1258     Sodium 142 mmol/L       Potassium 3.9 mmol/L      Chloride 107 mmol/L      CO2 26 mmol/L      ANION GAP 9 mmol/L      BUN 23 mg/dL      Creatinine 1.56 mg/dL      Glucose 102 mg/dL      Calcium 9.4 mg/dL      AST 17 U/L      ALT 13 U/L      Alkaline Phosphatase 90 U/L      Total Protein 7.1 g/dL      Albumin 3.8 g/dL      Total Bilirubin 1.04 mg/dL      eGFR 39 ml/min/1.73sq m     Narrative:      National Kidney Disease Foundation guidelines for Chronic Kidney Disease (CKD):     Stage 1 with normal or high GFR (GFR > 90 mL/min/1.73 square meters)    Stage 2 Mild CKD (GFR = 60-89 mL/min/1.73 square meters)    Stage 3A Moderate CKD (GFR = 45-59 mL/min/1.73 square meters)    Stage 3B Moderate CKD (GFR = 30-44 mL/min/1.73 square meters)    Stage 4 Severe CKD (GFR = 15-29 mL/min/1.73 square meters)    Stage 5 End Stage CKD (GFR <15 mL/min/1.73 square meters)  Note: GFR calculation is accurate only with a steady state creatinine    Magnesium [[id number]]  (Normal) Collected: 02/11/25 1228    Lab Status: Final result Specimen: Blood from Arm, Left Updated: 02/11/25 1258     Magnesium 2.1 mg/dL     Phosphorus [[id number]]  (Normal) Collected: 02/11/25 1228    Lab Status: Final result Specimen: Blood from Arm, Left Updated: 02/11/25 1258     Phosphorus 3.3 mg/dL     CBC and differential [[id number]]  (Abnormal) Collected: 02/11/25 1228    Lab Status: Final result Specimen: Blood from Arm, Left Updated: 02/11/25 1245     WBC 10.69 Thousand/uL      RBC 4.90 Million/uL      Hemoglobin 15.4 g/dL      Hematocrit 46.3 %      MCV 95 fL      MCH 31.4 pg      MCHC 33.3 g/dL      RDW 14.7 %      MPV 10.7 fL      Platelets 137 Thousands/uL      nRBC 0 /100 WBCs      Segmented % 63 %      Immature Grans % 1 %      Lymphocytes % 19 %      Monocytes % 15 %      Eosinophils Relative 1 %      Basophils Relative 1 %      Absolute Neutrophils 6.77 Thousands/µL      Absolute Immature Grans 0.06 Thousand/uL      Absolute Lymphocytes 2.04 Thousands/µL      Absolute  Monocytes 1.62 Thousand/µL      Eosinophils Absolute 0.14 Thousand/µL      Basophils Absolute 0.06 Thousands/µL             No orders to display       Procedures    ED Medication and Procedure Management   Prior to Admission Medications   Prescriptions Last Dose Informant Patient Reported? Taking?   Memantine HCl ER 14 MG CP24   No No   Sig: Take 1 capsule (14 mg total) by mouth every morning   Multiple Vitamins-Minerals (MULTI-VITAMIN/MINERALS PO)  Spouse/Significant Other Yes No   Sig: Take by mouth   Patient not taking: Reported on 12/19/2024   aspirin (ECOTRIN LOW STRENGTH) 81 mg EC tablet  Spouse/Significant Other Yes No   Sig: Take 81 mg by mouth   Patient not taking: Reported on 12/19/2024   donepezil (ARICEPT) 10 mg tablet   No No   Sig: Take 1 tablet (10 mg total) by mouth every morning   losartan (COZAAR) 50 mg tablet   No No   Sig: TAKE 1 TABLET BY MOUTH EVERY DAY   rosuvastatin (CRESTOR) 10 MG tablet  Spouse/Significant Other Yes No   Sig: Take 10 mg by mouth daily      Facility-Administered Medications: None     Discharge Medication List as of 2/11/2025  2:28 PM        CONTINUE these medications which have NOT CHANGED    Details   aspirin (ECOTRIN LOW STRENGTH) 81 mg EC tablet Take 81 mg by mouth, Starting Wed 10/5/2011, Historical Med      donepezil (ARICEPT) 10 mg tablet Take 1 tablet (10 mg total) by mouth every morning, Starting Tue 10/29/2024, Normal      losartan (COZAAR) 50 mg tablet TAKE 1 TABLET BY MOUTH EVERY DAY, Normal      Memantine HCl ER 14 MG CP24 Take 1 capsule (14 mg total) by mouth every morning, Starting Tue 10/29/2024, Normal      Multiple Vitamins-Minerals (MULTI-VITAMIN/MINERALS PO) Take by mouth, Historical Med      rosuvastatin (CRESTOR) 10 MG tablet Take 10 mg by mouth daily, Starting Sun 9/11/2022, Historical Med           No discharge procedures on file.  ED SEPSIS DOCUMENTATION   Time reflects when diagnosis was documented in both MDM as applicable and the Disposition within  this note       Time User Action Codes Description Comment    2/11/2025  2:27 PM Abigail Garcia Add [F03.90] Dementia (HCC)                  Abigail Garcia MD  02/13/25 150

## 2025-02-14 VITALS
SYSTOLIC BLOOD PRESSURE: 159 MMHG | OXYGEN SATURATION: 97 % | DIASTOLIC BLOOD PRESSURE: 81 MMHG | TEMPERATURE: 97.9 F | HEART RATE: 64 BPM | RESPIRATION RATE: 20 BRPM

## 2025-02-14 LAB
2HR DELTA HS TROPONIN: -16 NG/L
ANION GAP SERPL CALCULATED.3IONS-SCNC: 12 MMOL/L (ref 4–13)
BUN SERPL-MCNC: 24 MG/DL (ref 5–25)
CALCIUM SERPL-MCNC: 8.3 MG/DL (ref 8.4–10.2)
CARDIAC TROPONIN I PNL SERPL HS: 119 NG/L (ref ?–50)
CARDIAC TROPONIN I PNL SERPL HS: 135 NG/L (ref ?–50)
CHLORIDE SERPL-SCNC: 112 MMOL/L (ref 96–108)
CO2 SERPL-SCNC: 20 MMOL/L (ref 21–32)
CREAT SERPL-MCNC: 1.39 MG/DL (ref 0.6–1.3)
ERYTHROCYTE [DISTWIDTH] IN BLOOD BY AUTOMATED COUNT: 14.6 % (ref 11.6–15.1)
GFR SERPL CREATININE-BSD FRML MDRD: 45 ML/MIN/1.73SQ M
GLUCOSE SERPL-MCNC: 95 MG/DL (ref 65–140)
HCT VFR BLD AUTO: 39.9 % (ref 36.5–49.3)
HGB BLD-MCNC: 13.2 G/DL (ref 12–17)
MCH RBC QN AUTO: 31.7 PG (ref 26.8–34.3)
MCHC RBC AUTO-ENTMCNC: 33.1 G/DL (ref 31.4–37.4)
MCV RBC AUTO: 96 FL (ref 82–98)
PLATELET # BLD AUTO: 146 THOUSANDS/UL (ref 149–390)
PMV BLD AUTO: 11.1 FL (ref 8.9–12.7)
POTASSIUM SERPL-SCNC: 3.8 MMOL/L (ref 3.5–5.3)
RBC # BLD AUTO: 4.17 MILLION/UL (ref 3.88–5.62)
SODIUM SERPL-SCNC: 144 MMOL/L (ref 135–147)
T4 FREE SERPL-MCNC: 1.23 NG/DL (ref 0.61–1.12)
TSH SERPL DL<=0.05 MIU/L-ACNC: 5.06 UIU/ML (ref 0.45–4.5)
WBC # BLD AUTO: 10.31 THOUSAND/UL (ref 4.31–10.16)

## 2025-02-14 PROCEDURE — 93005 ELECTROCARDIOGRAM TRACING: CPT

## 2025-02-14 PROCEDURE — 99239 HOSP IP/OBS DSCHRG MGMT >30: CPT

## 2025-02-14 PROCEDURE — 84484 ASSAY OF TROPONIN QUANT: CPT | Performed by: STUDENT IN AN ORGANIZED HEALTH CARE EDUCATION/TRAINING PROGRAM

## 2025-02-14 PROCEDURE — 99204 OFFICE O/P NEW MOD 45 MIN: CPT | Performed by: INTERNAL MEDICINE

## 2025-02-14 PROCEDURE — 36415 COLL VENOUS BLD VENIPUNCTURE: CPT | Performed by: STUDENT IN AN ORGANIZED HEALTH CARE EDUCATION/TRAINING PROGRAM

## 2025-02-14 PROCEDURE — 80048 BASIC METABOLIC PNL TOTAL CA: CPT | Performed by: STUDENT IN AN ORGANIZED HEALTH CARE EDUCATION/TRAINING PROGRAM

## 2025-02-14 PROCEDURE — 85027 COMPLETE CBC AUTOMATED: CPT | Performed by: STUDENT IN AN ORGANIZED HEALTH CARE EDUCATION/TRAINING PROGRAM

## 2025-02-14 RX ORDER — SODIUM CHLORIDE, SODIUM GLUCONATE, SODIUM ACETATE, POTASSIUM CHLORIDE, MAGNESIUM CHLORIDE, SODIUM PHOSPHATE, DIBASIC, AND POTASSIUM PHOSPHATE .53; .5; .37; .037; .03; .012; .00082 G/100ML; G/100ML; G/100ML; G/100ML; G/100ML; G/100ML; G/100ML
100 INJECTION, SOLUTION INTRAVENOUS CONTINUOUS
Status: DISCONTINUED | OUTPATIENT
Start: 2025-02-14 | End: 2025-02-14 | Stop reason: HOSPADM

## 2025-02-14 RX ORDER — LOSARTAN POTASSIUM 25 MG/1
25 TABLET ORAL DAILY
Status: DISCONTINUED | OUTPATIENT
Start: 2025-02-15 | End: 2025-02-14 | Stop reason: HOSPADM

## 2025-02-14 RX ORDER — LOSARTAN POTASSIUM 25 MG/1
25 TABLET ORAL DAILY
Start: 2025-02-14

## 2025-02-14 RX ADMIN — ASPIRIN 81 MG CHEWABLE TABLET 81 MG: 81 TABLET CHEWABLE at 10:55

## 2025-02-14 RX ADMIN — HEPARIN SODIUM 5000 UNITS: 5000 INJECTION INTRAVENOUS; SUBCUTANEOUS at 10:56

## 2025-02-14 RX ADMIN — DONEPEZIL HYDROCHLORIDE 10 MG: 10 TABLET ORAL at 10:55

## 2025-02-14 RX ADMIN — MEMANTINE 10 MG: 10 TABLET ORAL at 10:55

## 2025-02-14 RX ADMIN — LOSARTAN POTASSIUM 50 MG: 50 TABLET, FILM COATED ORAL at 10:55

## 2025-02-14 RX ADMIN — HEPARIN SODIUM 5000 UNITS: 5000 INJECTION INTRAVENOUS; SUBCUTANEOUS at 00:29

## 2025-02-14 RX ADMIN — ASPIRIN 325 MG ORAL TABLET 325 MG: 325 PILL ORAL at 00:29

## 2025-02-14 RX ADMIN — SODIUM CHLORIDE, SODIUM GLUCONATE, SODIUM ACETATE, POTASSIUM CHLORIDE, MAGNESIUM CHLORIDE, SODIUM PHOSPHATE, DIBASIC, AND POTASSIUM PHOSPHATE 100 ML/HR: .53; .5; .37; .037; .03; .012; .00082 INJECTION, SOLUTION INTRAVENOUS at 07:47

## 2025-02-14 RX ADMIN — SODIUM CHLORIDE, SODIUM GLUCONATE, SODIUM ACETATE, POTASSIUM CHLORIDE, MAGNESIUM CHLORIDE, SODIUM PHOSPHATE, DIBASIC, AND POTASSIUM PHOSPHATE 100 ML/HR: .53; .5; .37; .037; .03; .012; .00082 INJECTION, SOLUTION INTRAVENOUS at 00:29

## 2025-02-14 NOTE — ASSESSMENT & PLAN NOTE
Presenting with syncopal episode with preceding lightheadedness  Troponin trended: 35 >76  No chest pain or shortness of breath  EKG with possible depressions in the anterior leads which resolved on repeat  Possibly related to hypotension and tachycardia in setting of know CAD  Aspirin discontinued 3 months ago per family. Load with aspirin and continue aspirin 81 mg daily  Telemetry   Cardiology consult

## 2025-02-14 NOTE — ED PROVIDER NOTES
Time reflects when diagnosis was documented in both MDM as applicable and the Disposition within this note       Time User Action Codes Description Comment    2/13/2025  8:43 PM Bryan Hernandez Add [R55] Syncope     2/13/2025  8:43 PM Deshaun Hernandezh Add [F03.90] Dementia (HCC)     2/13/2025  8:43 PM Krivchenia Bryan Add [I10] HTN (hypertension)     2/13/2025  8:43 PM Deshaun Hernandezh Add [I25.10] CAD (coronary artery disease)     2/13/2025  9:58 PM Jp Serna Add [R79.89] Elevated troponin           ED Disposition       ED Disposition   Admit    Condition   Stable    Date/Time   Thu Feb 13, 2025  8:43 PM    Comment   Case was discussed with Dr. Serna and the patient's admission status was agreed to be Admission Status: observation status to the service of Dr. Serna .               Assessment & Plan       Medical Decision Making  84-year-old male with history of dementia, hypertension, and CAD presenting for evaluation of syncopal episode occurring at his facility.  Patient was walking out of the bathroom and had sudden onset loss of consciousness but was able to be lowered into a chair.  Patient was reportedly unconscious for a few minutes per wife at bedside.  No head strike or other traumatic injury.  Patient reportedly had told staff that he had some back pain after the fall and was given nitroglycerin apparently out of concern for ACS but then reported that he had no back pain later.  Per wife, patient has no history of syncope.    Differential: Orthostatic hypotension, vasovagal syncope, arrhythmia, ACS.  Doubt subarachnoid hemorrhage.  Doubt aortic dissection.    Plan: Cardiac workup, CT head.  Will plan to give fluid bolus.    Patient's labs show elevated troponin of 35 but nonischemic EKG.  Chest x-ray and CT head showed no acute abnormalities.  Given concerning syncopal history, patient was admitted to the [facility] service for further evaluation and monitoring.            Amount and/or Complexity of  Data Reviewed  Labs: ordered. Decision-making details documented in ED Course.  Radiology: ordered.    Risk  Decision regarding hospitalization.        ED Course as of 02/13/25 2242   Thu Feb 13, 2025 1746 This EKG interpreted by me. Rate 112, rhythm sinus tachycardia, normal axis, intervals normal, no acute ST or T wave changes     1810 WBC(!): 13.95   1823 hs TnI 0hr: 35   1823 Creatinine(!): 1.59  Slightly elevated from baseline       Medications   donepezil (ARICEPT) tablet 10 mg (has no administration in time range)   losartan (COZAAR) tablet 50 mg (has no administration in time range)   memantine (NAMENDA) tablet 10 mg (has no administration in time range)   pravastatin (PRAVACHOL) tablet 80 mg (has no administration in time range)   multi-electrolyte (PLASMALYTE-A/ISOLYTE-S PH 7.4) IV solution (has no administration in time range)   heparin (porcine) subcutaneous injection 5,000 Units (has no administration in time range)   aspirin tablet 325 mg (has no administration in time range)   aspirin chewable tablet 81 mg (has no administration in time range)   sodium chloride 0.9 % bolus 1,000 mL (0 mL Intravenous Stopped 2/13/25 2128)       ED Risk Strat Scores                  Identification of Seniors at Risk      Flowsheet Row Most Recent Value   (ISAR) Identification of Seniors at Risk    Before the illness or injury that brought you to the Emergency, did you need someone to help you on a regular basis? 0 Filed at: 02/13/2025 1730   In the last 24 hours, have you needed more help than usual? 0 Filed at: 02/13/2025 1730   Have you been hospitalized for one or more nights during the past 6 months? 0 Filed at: 02/13/2025 1730   In general, do you see well? 0 Filed at: 02/13/2025 1730   In general, do you have serious problems with your memory? 0 Filed at: 02/13/2025 1730   Do you take more than three different medications every day? 1 Filed at: 02/13/2025 1730   ISAR Score 1 Filed at: 02/13/2025 1730                 SBIRT 20yo+      Flowsheet Row Most Recent Value   Initial Alcohol Screen: US AUDIT-C     1. How often do you have a drink containing alcohol? 0 Filed at: 02/13/2025 1730   2. How many drinks containing alcohol do you have on a typical day you are drinking?  0 Filed at: 02/13/2025 1730   3b. FEMALE Any Age, or MALE 65+: How often do you have 4 or more drinks on one occassion? 0 Filed at: 02/13/2025 1730   Audit-C Score 0 Filed at: 02/13/2025 1730   VANESSA: How many times in the past year have you...    Used an illegal drug or used a prescription medication for non-medical reasons? Never Filed at: 02/13/2025 1730                            History of Present Illness       Chief Complaint   Patient presents with    Syncope     Per EMS pt was given nitro for back pain at [facility]. Pt then tried to ambulate to the bathroom felt dizzy, sat down and had a syncopal episode. Pt has hx of dementia. No complaints at this time.        Past Medical History:   Diagnosis Date    Basal cell carcinoma     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     Coronary artery disease     CAD s/p stent 1995    Elevated PSA     R....11/16/17    Hypertension     Wears glasses       Past Surgical History:   Procedure Laterality Date    COLONOSCOPY      INGUINAL HERNIA REPAIR      MOHS SURGERY      SINUS SURGERY Right 09/29/2022    SKIN BIOPSY      basal cell     TONSILLECTOMY        Family History   Problem Relation Age of Onset    COPD Mother     Heart failure Mother     Heart attack Father     Lung cancer Brother     No Known Problems Son     No Known Problems Daughter     No Known Problems Daughter       Social History     Tobacco Use    Smoking status: Never    Smokeless tobacco: Never   Vaping Use    Vaping status: Never Used   Substance Use Topics    Alcohol use: No    Drug use: No      E-Cigarette/Vaping    E-Cigarette Use Never User       E-Cigarette/Vaping Substances    Nicotine No     THC No      CBD No     Flavoring No     Other No     Unknown No       I have reviewed and agree with the history as documented.     Patient is an 84-year-old male with history of dementia, hypertension, and CAD presenting today for evaluation of syncope.  Patient is brought in by EMS.  Patient resides at [facility] where he was recently upgraded to the assisted living side.  Patient reportedly was walking out of the bathroom using his walker and assisted by a staff member when he suddenly began to fall.  He was assisted to a chair by staff member.  No head strike.  Patient was unconscious for a few minutes reportedly.  When he came to, patient stated that he had some back pain and a nurse practitioner at the facility gave him a dose of nitroglycerin.  Before getting nitroglycerin though, patient stated that he did not have back pain.  Patient's wife at bedside states that patient has been feeling well for the past day but has had a progressive mental decline.  He was evaluated in this emerged department 2 days ago for altered mental status and was discharged after normal labs.  He has had no recent falls.  Denies any recent chest pain, palpitations, shortness of breath, illnesses, abdominal pain, or any other complaints.  Patient is demented at baseline and repeats over and over again that his status of .        Review of Systems   Unable to perform ROS: Dementia           Objective       ED Triage Vitals [02/13/25 1728]   Temperature Pulse Blood Pressure Respirations SpO2 Patient Position - Orthostatic VS   97.9 °F (36.6 °C) (!) 113 123/67 18 97 % Lying      Temp Source Heart Rate Source BP Location FiO2 (%) Pain Score    Oral Monitor Right arm -- No Pain      Vitals      Date and Time Temp Pulse SpO2 Resp BP Pain Score FACES Pain Rating User   02/13/25 2100 -- 72 -- 21 117/75 -- -- ARABELLA   02/13/25 2000 -- 76 97 % 21 119/68 -- -- OO   02/13/25 1917 -- -- -- -- -- No Pain -- OO   02/13/25 1900 -- 88 97 % 17  107/61 -- -- OO   02/13/25 1729 -- -- 97 % -- -- -- -- AS   02/13/25 1728 97.9 °F (36.6 °C) 113 97 % 18 123/67 No Pain -- AS            Physical Exam  Vitals and nursing note reviewed.   Constitutional:       General: He is not in acute distress.     Appearance: Normal appearance. He is well-developed. He is not ill-appearing, toxic-appearing or diaphoretic.   HENT:      Head: Normocephalic and atraumatic.      Right Ear: External ear normal.      Left Ear: External ear normal.      Nose: Nose normal. No congestion or rhinorrhea.      Mouth/Throat:      Mouth: Mucous membranes are moist.   Eyes:      General: No scleral icterus.        Right eye: No discharge.         Left eye: No discharge.      Extraocular Movements: Extraocular movements intact.      Conjunctiva/sclera: Conjunctivae normal.      Pupils: Pupils are equal, round, and reactive to light.   Cardiovascular:      Rate and Rhythm: Regular rhythm. Tachycardia present.      Pulses: Normal pulses.      Heart sounds: Normal heart sounds. No murmur heard.     No friction rub. No gallop.   Pulmonary:      Effort: Pulmonary effort is normal. No respiratory distress.      Breath sounds: Normal breath sounds. No stridor. No wheezing, rhonchi or rales.   Chest:      Chest wall: No tenderness.   Abdominal:      General: Abdomen is flat. Bowel sounds are normal. There is no distension.      Palpations: Abdomen is soft.      Tenderness: There is no abdominal tenderness. There is no guarding or rebound.   Musculoskeletal:         General: Normal range of motion.      Cervical back: Normal range of motion and neck supple. No rigidity or tenderness.      Right lower leg: No edema.      Left lower leg: No edema.   Skin:     General: Skin is warm and dry.      Capillary Refill: Capillary refill takes less than 2 seconds.      Coloration: Skin is not jaundiced or pale.   Neurological:      General: No focal deficit present.      Mental Status: He is alert. Mental status is  at baseline. He is disoriented.      Cranial Nerves: No cranial nerve deficit.      Sensory: No sensory deficit.      Motor: No weakness.   Psychiatric:         Mood and Affect: Mood normal.         Behavior: Behavior normal.         Results Reviewed       Procedure Component Value Units Date/Time    HS Troponin I 4hr [[id number]]  (Abnormal) Collected: 02/13/25 2142    Lab Status: Final result Specimen: Blood from Arm, Right Updated: 02/13/25 2213     hs TnI 4hr 135 ng/L      Delta 4hr hsTnI 100 ng/L     HS Troponin I 2hr [[id number]]  (Abnormal) Resulted: 02/13/25 2104    Lab Status: Final result Specimen: Blood Updated: 02/13/25 2104     hs TnI 2hr 76 ng/L      Delta 2hr hsTnI 41 ng/L     Comprehensive metabolic panel [[id number]]  (Abnormal) Collected: 02/13/25 1743    Lab Status: Final result Specimen: Blood from Arm, Left Updated: 02/13/25 1818     Sodium 142 mmol/L      Potassium 4.6 mmol/L      Chloride 106 mmol/L      CO2 25 mmol/L      ANION GAP 11 mmol/L      BUN 27 mg/dL      Creatinine 1.59 mg/dL      Glucose 140 mg/dL      Calcium 9.4 mg/dL      AST 37 U/L      ALT 21 U/L      Alkaline Phosphatase 98 U/L      Total Protein 7.8 g/dL      Albumin 4.2 g/dL      Total Bilirubin 0.67 mg/dL      eGFR 39 ml/min/1.73sq m     Narrative:      National Kidney Disease Foundation guidelines for Chronic Kidney Disease (CKD):     Stage 1 with normal or high GFR (GFR > 90 mL/min/1.73 square meters)    Stage 2 Mild CKD (GFR = 60-89 mL/min/1.73 square meters)    Stage 3A Moderate CKD (GFR = 45-59 mL/min/1.73 square meters)    Stage 3B Moderate CKD (GFR = 30-44 mL/min/1.73 square meters)    Stage 4 Severe CKD (GFR = 15-29 mL/min/1.73 square meters)    Stage 5 End Stage CKD (GFR <15 mL/min/1.73 square meters)  Note: GFR calculation is accurate only with a steady state creatinine    HS Troponin 0hr (reflex protocol) [[id number]]  (Normal) Collected: 02/13/25 1743    Lab Status: Final result Specimen: Blood from Arm, Left  Updated: 02/13/25 1812     hs TnI 0hr 35 ng/L     TSH, 3rd generation with Free T4 reflex [[id number]]     Lab Status: No result Specimen: Blood     CBC and differential [[id number]]  (Abnormal) Collected: 02/13/25 1743    Lab Status: Final result Specimen: Blood from Arm, Left Updated: 02/13/25 1756     WBC 13.95 Thousand/uL      RBC 5.06 Million/uL      Hemoglobin 15.9 g/dL      Hematocrit 48.9 %      MCV 97 fL      MCH 31.4 pg      MCHC 32.5 g/dL      RDW 14.8 %      MPV 11.3 fL      Platelets 166 Thousands/uL      nRBC 0 /100 WBCs      Segmented % 69 %      Immature Grans % 1 %      Lymphocytes % 16 %      Monocytes % 12 %      Eosinophils Relative 2 %      Basophils Relative 0 %      Absolute Neutrophils 9.71 Thousands/µL      Absolute Immature Grans 0.12 Thousand/uL      Absolute Lymphocytes 2.18 Thousands/µL      Absolute Monocytes 1.67 Thousand/µL      Eosinophils Absolute 0.22 Thousand/µL      Basophils Absolute 0.05 Thousands/µL             CT head wo contrast   Final Interpretation by Tyrese Walden MD (02/13 1919)      1.  No acute intracranial abnormality.   2.  Chronic diffuse parenchymal volume loss.   3.  Mild chronic microangiopathic changes.                  Workstation performed: [id number]         XR chest 1 view portable    (Results Pending)       Procedures    ED Medication and Procedure Management   Prior to Admission Medications   Prescriptions Last Dose Informant Patient Reported? Taking?   Memantine HCl ER 14 MG CP24 2/13/2025  No Yes   Sig: Take 1 capsule (14 mg total) by mouth every morning   Multiple Vitamins-Minerals (MULTI-VITAMIN/MINERALS PO) Not Taking Spouse/Significant Other Yes No   Sig: Take by mouth   Patient not taking: Reported on 12/19/2024   aspirin (ECOTRIN LOW STRENGTH) 81 mg EC tablet Not Taking Spouse/Significant Other Yes No   Sig: Take 81 mg by mouth   Patient not taking: Reported on 2/13/2025   donepezil (ARICEPT) 10 mg tablet 2/13/2025  No Yes   Sig: Take 1 tablet  (10 mg total) by mouth every morning   losartan (COZAAR) 50 mg tablet 2/13/2025  No Yes   Sig: TAKE 1 TABLET BY MOUTH EVERY DAY   rosuvastatin (CRESTOR) 10 MG tablet 2/13/2025 Spouse/Significant Other Yes Yes   Sig: Take 10 mg by mouth daily      Facility-Administered Medications: None     Patient's Medications   Discharge Prescriptions    No medications on file     No discharge procedures on file.  ED SEPSIS DOCUMENTATION   Time reflects when diagnosis was documented in both MDM as applicable and the Disposition within this note       Time User Action Codes Description Comment    2/13/2025  8:43 PM Bryan Hernandez Add [R55] Syncope     2/13/2025  8:43 PM Bryan Hernandez Add [F03.90] Dementia (HCC)     2/13/2025  8:43 PM Bryan Hernandez Add [I10] HTN (hypertension)     2/13/2025  8:43 PM Bryan Hernandez Add [I25.10] CAD (coronary artery disease)     2/13/2025  9:58 PM Jp Serna Add [R79.89] Elevated troponin                  Bryan Hernandez MD  02/13/25 1624

## 2025-02-14 NOTE — ASSESSMENT & PLAN NOTE
Lab Results   Component Value Date    EGFR 45 02/14/2025    EGFR 39 02/13/2025    EGFR 39 02/11/2025    CREATININE 1.39 (H) 02/14/2025    CREATININE 1.59 (H) 02/13/2025    CREATININE 1.56 (H) 02/11/2025     At baseline   Monitor

## 2025-02-14 NOTE — H&P
H&P - Hospitalist   Name: Yung Concepcion 84 y.o. male I MRN: [id number]  Unit/Bed#: ED 16 I Date of Admission: 2/13/2025   Date of Service: 2/13/2025 I Hospital Day: 0     Assessment & Plan  Syncope  84-year-old male with past medical history of CAD status post remote stent, hypertension, Alzheimer's dementia, hyperlipidemia who presents from nursing home after a syncopal episode.  Patient was walking out of the bathroom with his walker and suddenly family noted that he looked pale and lightheaded and he collapsed to the chair.  No head strike but did lose consciousness for several minutes.  Patient has poor recollection of events secondary to underlying dementia.  Per family at bedside, patient received nitro tab after the episode given patient reported back pain.  Patient denies any chest pain or shortness of breath.  CT head with no acute intracranial pathology  Chest x-ray grossly unremarkable, follow-up radiology report  Troponin trend 35 > 76, continue to trend  EKG with some ST depressions which resolved on repeat EKG  Gentle IVF  Telemetry  Echo ordered  Cardiology consult  Elevated troponin  Presenting with syncopal episode with preceding lightheadedness  Troponin trended: 35 >76  No chest pain or shortness of breath  EKG with possible depressions in the anterior leads which resolved on repeat  Possibly related to hypotension and tachycardia in setting of know CAD  Aspirin discontinued 3 months ago per family. Load with aspirin and continue aspirin 81 mg daily  Telemetry   Cardiology consult   Coronary artery disease involving native coronary artery of native heart without angina pectoris  History of NSTEMI 1997 with LAD DCA; RCA stenting 2002; negative stress echo 2019  Maintained on crestor and losartan  Aspirin discontinued approximately 3 months ago, resume for now  Stage 3b chronic kidney disease (HCC)  Lab Results   Component Value Date    EGFR 39 02/13/2025    EGFR 39 02/11/2025    EGFR 42  01/08/2025    CREATININE 1.59 (H) 02/13/2025    CREATININE 1.56 (H) 02/11/2025    CREATININE 1.47 (H) 01/08/2025     At baseline   Monitor   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  Code Status: Level 3 - DNAR and DNI   Discussion with family: Updated  (wife and son) at bedside.    Anticipated Length of Stay: Patient will be admitted on an observation basis with an anticipated length of stay of less than 2 midnights secondary to syncope.    History of Present Illness   Chief Complaint: Syncope    Yung Concepcion is a 84 y.o. male with a PMH of CAD status post remote stent, hypertension, Alzheimer systemic show, hyperlipidemia who presents from nursing home after a syncopal episode.  Patient was walking out of the bathroom with his walker and suddenly family noted that he looked pale and lightheaded and he collapsed to the chair.  No head strike but did lose consciousness for several minutes.  Patient has poor recollection of events secondary to underlying dementia.  Per family at bedside, patient received nitro tab after the episode given patient reported back pain.  Patient denies any chest pain or shortness of breath.  Vital signs stable.  EKG with initial ST depressions which resolved on repeat EKG.  Troponin trend 35 > 76.  Patient will be monitored overnight on telemetry with cardiology consult.    Review of Systems   All other systems reviewed and are negative.      Historical Information   Past Medical History:   Diagnosis Date    Basal cell carcinoma     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     Coronary artery disease     CAD s/p stent 1995    Elevated PSA     R....11/16/17    Hypertension     Wears glasses      Past Surgical History:   Procedure Laterality Date     COLONOSCOPY      INGUINAL HERNIA REPAIR      MOHS SURGERY      SINUS SURGERY Right 09/29/2022    SKIN BIOPSY      basal cell     TONSILLECTOMY       Social History     Tobacco Use    Smoking status: Never    Smokeless tobacco: Never   Vaping Use    Vaping status: Never Used   Substance and Sexual Activity    Alcohol use: No    Drug use: No    Sexual activity: Yes     Partners: Female     E-Cigarette/Vaping    E-Cigarette Use Never User      E-Cigarette/Vaping Substances    Nicotine No     THC No     CBD No     Flavoring No     Other No     Unknown No      Family history non-contributory  Social History:  Marital Status: /Civil Union       Meds/Allergies   I have reveiwed home medications using records provided by [facility].  Prior to Admission medications    Medication Sig Start Date End Date Taking? Authorizing Provider   donepezil (ARICEPT) 10 mg tablet Take 1 tablet (10 mg total) by mouth every morning 10/29/24  Yes Louis Lees MD   losartan (COZAAR) 50 mg tablet TAKE 1 TABLET BY MOUTH EVERY DAY 11/4/24  Yes Cliff Fritz MD   Memantine HCl ER 14 MG CP24 Take 1 capsule (14 mg total) by mouth every morning 10/29/24  Yes Louis Lees MD   rosuvastatin (CRESTOR) 10 MG tablet Take 10 mg by mouth daily 9/11/22  Yes Historical Provider, MD   aspirin (ECOTRIN LOW STRENGTH) 81 mg EC tablet Take 81 mg by mouth  Patient not taking: Reported on 2/13/2025 10/5/11   Historical Provider, MD   Multiple Vitamins-Minerals (MULTI-VITAMIN/MINERALS PO) Take by mouth  Patient not taking: Reported on 12/19/2024    Historical Provider, MD     No Known Allergies    Objective :  Temp:  [97.9 °F (36.6 °C)] 97.9 °F (36.6 °C)  HR:  [] 72  BP: (107-123)/(61-75) 117/75  Resp:  [17-21] 21  SpO2:  [97 %] 97 %  O2 Device: None (Room air)    Physical Exam  Vitals reviewed.   Constitutional:       General: He is not in acute distress.     Appearance: Normal appearance. He is not ill-appearing.   HENT:      Head: Normocephalic  and atraumatic.   Cardiovascular:      Rate and Rhythm: Normal rate and regular rhythm.      Heart sounds: Normal heart sounds.   Pulmonary:      Effort: Pulmonary effort is normal. No respiratory distress.      Breath sounds: No wheezing or rales.   Abdominal:      General: Bowel sounds are normal.      Tenderness: There is no abdominal tenderness.   Musculoskeletal:      Right lower leg: No edema.      Left lower leg: No edema.   Neurological:      Mental Status: He is alert. He is disoriented.      Comments: Oriented to self only        Lines/Drains:            Lab Results: I have reviewed the following results:  Results from last 7 days   Lab Units 02/13/25  1743   WBC Thousand/uL 13.95*   HEMOGLOBIN g/dL 15.9   HEMATOCRIT % 48.9   PLATELETS Thousands/uL 166   SEGS PCT % 69   LYMPHO PCT % 16   MONO PCT % 12   EOS PCT % 2     Results from last 7 days   Lab Units 02/13/25  1743   SODIUM mmol/L 142   POTASSIUM mmol/L 4.6   CHLORIDE mmol/L 106   CO2 mmol/L 25   BUN mg/dL 27*   CREATININE mg/dL 1.59*   ANION GAP mmol/L 11   CALCIUM mg/dL 9.4   ALBUMIN g/dL 4.2   TOTAL BILIRUBIN mg/dL 0.67   ALK PHOS U/L 98   ALT U/L 21   AST U/L 37   GLUCOSE RANDOM mg/dL 140             Lab Results   Component Value Date    HGBA1C 5.7 (H) 01/08/2025           Imaging Results Review: I reviewed radiology reports from this admission including: CT head.  Other Study Results Review: EKG was reviewed.     Administrative Statements   I have spent a total time of 65 minutes in caring for this patient on the day of the visit/encounter including Impressions, Counseling / Coordination of care, Documenting in the medical record, Reviewing / ordering tests, medicine, procedures  , Obtaining or reviewing history  , and Communicating with other healthcare professionals .    ** Please Note: This note has been constructed using a voice recognition system. **

## 2025-02-14 NOTE — ASSESSMENT & PLAN NOTE
History of NSTEMI 1997 with LAD DCA; RCA stenting 2002; negative stress echo 2019  Maintained on crestor and losartan  Aspirin discontinued approximately 3 months ago, resume for now

## 2025-02-14 NOTE — ASSESSMENT & PLAN NOTE
84-year-old male with past medical history of CAD status post remote stent, hypertension, Alzheimer's dementia, hyperlipidemia who presents from nursing home after a syncopal episode.  Patient was walking out of the bathroom with his walker and suddenly family noted that he looked pale and lightheaded and he collapsed to the chair.  No head strike but did lose consciousness for several minutes.  Patient has poor recollection of events secondary to underlying dementia.  Per family at bedside, patient received nitro tab after the episode given patient reported back pain.  Patient denies any chest pain or shortness of breath.  CT head with no acute intracranial pathology  Chest x-ray grossly unremarkable, follow-up radiology report  Troponin trend 35 > 76, continue to trend  EKG with some ST depressions which resolved on repeat EKG  Gentle IVF  Telemetry  Echo ordered  Cardiology consult

## 2025-02-14 NOTE — ASSESSMENT & PLAN NOTE
Lab Results   Component Value Date    EGFR 39 02/13/2025    EGFR 39 02/11/2025    EGFR 42 01/08/2025    CREATININE 1.59 (H) 02/13/2025    CREATININE 1.56 (H) 02/11/2025    CREATININE 1.47 (H) 01/08/2025     At baseline   Monitor

## 2025-02-14 NOTE — CASE MANAGEMENT
Case Management Progress Note    Patient name Yung Concepcion  Location ED 19/ED 19 MRN [id number]  : 1940 Date 2025       LOS (days): 0  Geometric Mean LOS (GMLOS) (days):   Days to GMLOS:        Covering ED CM was informed pt is stable for d/c back to [facility] located in [facility], to resume short-term skilled rehab placement, which is from where pt was brought to the ED.    CM arranged BLS ambulance via [facility] (phone# [phone]) for 3:30PM pickup this day to transport pt back to SNF.    SLIM Dr Anderson Marquez and LUIS CARLOS Campuzano were both informed of pickup time. Family at bedside were updated, too.    CM completed PCS form for transport. RN was requested to call report to SNF at [phone].    No further CM needs.

## 2025-02-14 NOTE — DISCHARGE SUMMARY
Discharge Summary - Hospitalist   Name: Yung Concepcion 84 y.o. male I MRN: [id number]  Unit/Bed#: ED 19 I Date of Admission: 2/13/2025   Date of Service: 2/14/2025 I Hospital Day: 0     Assessment & Plan  Syncope  84-year-old male with past medical history of CAD status post remote stent, hypertension, Alzheimer's dementia, hyperlipidemia who presents from nursing home after a syncopal episode.  Patient was walking out of the bathroom with his walker and suddenly family noted that he looked pale and lightheaded and he collapsed to the chair.  No head strike but did lose consciousness for several minutes.  Patient has poor recollection of events secondary to underlying dementia.  Per family at bedside, patient received nitro tab after the episode given patient reported back pain.  Patient denies any chest pain or shortness of breath.  CT head with no acute intracranial pathology  Troponins plateaued  Cardiology on board  At this time patient medically clear for discharge  Low suspicion of ACS per cardiology  Advised to drop the losartan to 25 mg once a day  Family would prefer to be discharged today  Will need help with transportation back to [place] I did reach out to the .  Elevated troponin  Presenting with syncopal episode with preceding lightheadedness  Troponin trended: 35 >76  No chest pain or shortness of breath  EKG with possible depressions in the anterior leads which resolved on repeat  Possibly related to hypotension and tachycardia in setting of know CAD  Aspirin discontinued 3 months ago per family. Load with aspirin and continue aspirin 81 mg daily  Telemetry   Cardiology consult   Coronary artery disease involving native coronary artery of native heart without angina pectoris  History of NSTEMI 1997 with LAD DCA; RCA stenting 2002; negative stress echo 2019  Maintained on crestor and losartan  Aspirin discontinued approximately 3 months ago, resume for now  Stage 3b chronic kidney  disease (HCC)  Lab Results   Component Value Date    EGFR 45 02/14/2025    EGFR 39 02/13/2025    EGFR 39 02/11/2025    CREATININE 1.39 (H) 02/14/2025    CREATININE 1.59 (H) 02/13/2025    CREATININE 1.56 (H) 02/11/2025     At baseline   Monitor   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)  Supportive care  Continue PTA donepezil and memantine     Medical Problems       Resolved Problems  Date Reviewed: 10/29/2024   None       Discharging Physician / Practitioner: Anderson Marquez MD  PCP: Yary Bennett MD  Admission Date:   Admission Orders (From admission, onward)       Ordered        02/13/25 2044  Place in Observation  Once                          Discharge Date: 02/14/25    Consultations During Hospital Stay:  None    Procedures Performed:   none    Significant Findings / Test Results:   XR chest 1 view portable  Result Date: 2/14/2025  Impression: No acute cardiopulmonary disease. Workstation performed: [id number]     CT head wo contrast  Result Date: 2/13/2025  Impression: 1.  No acute intracranial abnormality. 2.  Chronic diffuse parenchymal volume loss. 3.  Mild chronic microangiopathic changes. Workstation performed: [id number]       XR chest 1 view portable  Result Date: 2/14/2025  Impression No acute cardiopulmonary disease. Workstation performed: [id number]          Incidental Findings:   none       Test Results Pending at Discharge (will require follow up):   none     Outpatient Tests Requested:  none    Complications:  none    Reason for Admission: None    Hospital Course:   Yung Concepcion is a 84 y.o. male patient who originally presented to the hospital on 2/13/2025 due to syncopal episode likely due to too high of a dose of hypertension medication.  Cardiology on board and deemed patient fit for discharge.  Will arrange for transport back to facility.        Please see above list of diagnoses and related plan for additional information.     Condition at  Discharge: good    Discharge Day Visit / Exam:   Subjective:  No complaints at this time.  Patient has dementia at baseline.  Per family members patient is currently at baseline.  Vitals: Blood Pressure: 148/71 (02/14/25 0615)  Pulse: 60 (02/14/25 0615)  Temperature: 97.9 °F (36.6 °C) (02/13/25 1728)  Temp Source: Oral (02/13/25 1728)  Respirations: 20 (02/14/25 0615)  SpO2: 97 % (02/14/25 0615)  Physical Exam  Vitals and nursing note reviewed.   Constitutional:       Appearance: He is well-developed and normal weight.   HENT:      Head: Normocephalic and atraumatic.      Nose: Nose normal.      Mouth/Throat:      Mouth: Mucous membranes are moist.   Eyes:      Conjunctiva/sclera: Conjunctivae normal.   Cardiovascular:      Rate and Rhythm: Normal rate and regular rhythm.      Heart sounds: Normal heart sounds. No murmur heard.  Pulmonary:      Effort: Pulmonary effort is normal. No respiratory distress.      Breath sounds: Normal breath sounds.   Abdominal:      General: Abdomen is flat.      Palpations: Abdomen is soft.      Tenderness: There is no abdominal tenderness.   Musculoskeletal:         General: No swelling.      Cervical back: Neck supple.      Right lower leg: No edema.      Left lower leg: No edema.   Skin:     General: Skin is warm and dry.      Capillary Refill: Capillary refill takes less than 2 seconds.   Neurological:      General: No focal deficit present.      Mental Status: He is alert. Mental status is at baseline. He is disoriented.   Psychiatric:         Mood and Affect: Mood normal.          Discussion with Family: Updated  () at bedside.    Discharge instructions/Information to patient and family:   See after visit summary for information provided to patient and family.      Provisions for Follow-Up Care:  See after visit summary for information related to follow-up care and any pertinent home health orders.      Mobility at time of Discharge:      HLM Goal achieved.  Continue to encourage appropriate mobility.     Disposition:  [facility] long term assisted living    Planned Readmission: None    Discharge Medications:  See after visit summary for reconciled discharge medications provided to patient and/or family.      Administrative Statements   Discharge Statement:  I have spent a total time of 40 minutes in caring for this patient on the day of the visit/encounter. >30 minutes of time was spent on: Diagnostic results, Prognosis, Risks and benefits of tx options, Instructions for management, Patient and family education, Importance of tx compliance, Risk factor reductions, Impressions, Counseling / Coordination of care, Documenting in the medical record, Reviewing / ordering tests, medicine, procedures  , and Communicating with other healthcare professionals .    **Please Note: This note may have been constructed using a voice recognition system**

## 2025-02-14 NOTE — ASSESSMENT & PLAN NOTE
84-year-old male with past medical history of CAD status post remote stent, hypertension, Alzheimer's dementia, hyperlipidemia who presents from nursing home after a syncopal episode.  Patient was walking out of the bathroom with his walker and suddenly family noted that he looked pale and lightheaded and he collapsed to the chair.  No head strike but did lose consciousness for several minutes.  Patient has poor recollection of events secondary to underlying dementia.  Per family at bedside, patient received nitro tab after the episode given patient reported back pain.  Patient denies any chest pain or shortness of breath.  CT head with no acute intracranial pathology  Troponins plateaued  Cardiology on board  At this time patient medically clear for discharge  Low suspicion of ACS per cardiology  Advised to drop the losartan to 25 mg once a day  Family would prefer to be discharged today  Will need help with transportation back to [place] I did reach out to the .

## 2025-02-14 NOTE — CONSULTS
Consult - Cardiology   Yung Concepcion 84 y.o. male MRN: [id number]  Unit/Bed#: ED 19 Encounter: [id number]        Reason For Consult: Syncope  Outpatient Cardiologist: Dr. Serna, [facility] cardiology               ASSESSMENT:  Syncope  Patient with fall and syncopal episode in the assisted living facility  EKG shows sinus rhythm with nonspecific changes  Telemetry shows sinus rhythm with rates in the 80s  Troponin mildly elevated 35 --> 76 --> 135, likely demand related from suspected hypotension  Coronary artery disease  Remote history of coronary stenting  1997 DCA to LAD  2002 stenting of RCA  2019 stress echo: No ischemia  Hypertension  Prehospital Rx: Losartan 50 daily  Hyperlipidemia  Prehospital Rx: Crestor 10 daily  Chronic kidney disease  Baseline creatinine around 1.4-1.6  Alzheimer's disease    PLAN/ DISCUSSION:     Syncope  History is obtained from charts as patient is oriented x 1 can provide no history at all beyond his name and year of birth  It seems likely symptoms were orthostatic as he went from sitting to standing and subsequently had a fall  He has now been hydrated so orthostatics will likely be negative  Consider decreasing losartan down to 25 daily  Coronary artery disease  Stable, continue aspirin and statin  Hypertension  Consider decreasing losartan to prevent orthostatics  Hyperlipidemia  Continue Crestor    History Of Present Illness:  Jamie is a pleasant 84-year-old gentleman who was brought to the hospital yesterday for a syncopal episode at the nursing home.  He has advanced dementia and provides no history.  He can tell me his name and date of birth but beyond that does not know the name of the hospital or that he is in the hospital.  Reportedly he was with family and got up and walked to the bathroom.  While walking he had a fall.  He then reported some back pain was given nitroglycerin by staff.  He was brought to the hospital and admitted for observation.    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 Authorizing Provider   donepezil (ARICEPT) 10 mg tablet Take 1 tablet (10 mg total) by mouth every morning 10/29/24  Yes Louis Lees MD   losartan (COZAAR) 50 mg tablet TAKE 1 TABLET BY MOUTH EVERY DAY 11/4/24  Yes Cliff Fritz MD   Memantine HCl ER 14 MG CP24 Take 1 capsule (14 mg total) by mouth every morning 10/29/24  Yes Louis Lees MD   rosuvastatin (CRESTOR) 10 MG tablet Take 10 mg by mouth daily 9/11/22  Yes Historical Provider, MD   aspirin (ECOTRIN LOW STRENGTH) 81 mg EC tablet Take 81 mg by mouth  Patient not taking: Reported on 2/13/2025 10/5/11   Historical Provider, MD   Multiple Vitamins-Minerals (MULTI-VITAMIN/MINERALS PO) Take by mouth  Patient not taking: Reported on 12/19/2024    Historical Provider, MD       Family History:     Family History   Problem Relation Age of Onset    COPD Mother     Heart failure Mother     Heart attack Father     Lung cancer Brother     No Known Problems Son     No Known Problems Daughter     No Known Problems Daughter         Social History:       Social History     Socioeconomic History    Marital status: /Civil Union     Spouse name: Kimmy     Number of children: 3    Years of education: None    Highest education level: None   Occupational History    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      Comment: retired    Tobacco Use    Smoking status:  Never    Smokeless tobacco: Never   Vaping Use    Vaping status: Never Used   Substance and Sexual Activity    Alcohol use: No    Drug use: No    Sexual activity: Yes     Partners: Female   Other Topics Concern    None   Social History Narrative    Who lives in your home: wife     What type of home do you live in: Other long-term home ([place])     Age of your home: 2.5 yrs the portion where they live     How long have you been living there: 2017    Type of heat: Forced hot air    Type of fuel: Electric    What type of sadia is in your bedroom: Area rugs and Hardwood floor    Do you have the following in or near your home:    Air products: Central air    Pests: None    Pets: None    Are pets allowed in bedroom: N/A    Open fields, wooded areas nearby: Open fields    Basement: None    Exposure to second hand smoke: No        Habits:    Caffeine: soda occasionally-hot tea 1-2 cups daily     Chocolate: rarely     Other:     Social Drivers of Health     Financial Resource Strain: Low Risk  (11/27/2023)    Overall Financial Resource Strain (CARDIA)     Difficulty of Paying Living Expenses: Not hard at all   Food Insecurity: No Food Insecurity (12/17/2024)    Hunger Vital Sign     Worried About Running Out of Food in the Last Year: Never true     Ran Out of Food in the Last Year: Never true   Transportation Needs: No Transportation Needs (12/17/2024)    PRAPARE - Transportation     Lack of Transportation (Medical): No     Lack of Transportation (Non-Medical): No   Physical Activity: Sufficiently Active (7/29/2020)    Exercise Vital Sign     Days of Exercise per Week: 7 days     Minutes of Exercise per Session: 30 min   Stress: Not on file   Social Connections: Not on file   Intimate Partner Violence: Not on file   Housing Stability: Low Risk  (12/17/2024)    Housing Stability Vital Sign     Unable to Pay for Housing in the Last Year: No     Number of Times Moved in the Last Year: 0     Homeless in the Last Year: No        ROS:  14 point ROS negative except as outlined above  Remainder review of systems is negative    Exam:  General:  alert, oriented and in no distress, cooperative  Head: Normocephalic, atraumatic.  Eyes:  EOMI. Pupils - equal, round, reactive to accomodation.  No icterus.  Normal Conjunctiva.   Oropharynx: moist and normal-appearing mucosa  Neck: supple, symmetrical, trachea midline and no JVD  Heart:  RRR, No: murmer, rub or gallop, S1 & S2 normal   Respiratory effort / Chest Inspection: unlabored  Lungs:  normal air entry, lungs clear to auscultation and no rales, rhonchi or wheezing   Abdomen: flat, normal findings: bowel sounds normal and soft, non-tender  Lower Limbs:  no pitting edema  Pulses::  RLE - DP: present 2+                 LLE - DP: present 2+  Musculoskeletal: ROM grossly normal        DATA:      ECG:                     Telemetry: Normal sinus rhythm, . HR 80s          Echocardiogram:           Ischemic Testing:         Weights:    Wt Readings from Last 3 Encounters:   12/19/24 66.8 kg (147 lb 3.2 oz)   10/29/24 69.3 kg (152 lb 12.8 oz)   10/10/24 70.8 kg (156 lb)   , There is no height or weight on file to calculate BMI.         Lab Studies:             Results from last 7 days   Lab Units 02/14/25 0424 02/13/25 1743 02/11/25  1228   WBC Thousand/uL 10.31* 13.95* 10.69*   HEMOGLOBIN g/dL 13.2 15.9 15.4   HEMATOCRIT % 39.9 48.9 46.3   PLATELETS Thousands/uL 146* 166 137*   ,   Results from last 7 days   Lab Units 02/14/25 0424 02/13/25 1743 02/11/25  1228   POTASSIUM mmol/L 3.8 4.6 3.9   CHLORIDE mmol/L 112* 106 107   CO2 mmol/L 20* 25 26   BUN mg/dL 24 27* 23   CREATININE mg/dL 1.39* 1.59* 1.56*   CALCIUM mg/dL 8.3* 9.4 9.4   ALK PHOS U/L  --  98 90   ALT U/L  --  21 13   AST U/L  --  37 17

## 2025-02-14 NOTE — UTILIZATION REVIEW
Initial Clinical Review    Admission: Date/Time/Statement:   Admission Orders (From admission, onward)       Ordered        02/13/25 2044  Place in Observation  Once                          Orders Placed This Encounter   Procedures    Place in Observation     Standing Status:   Standing     Number of Occurrences:   1     Level of Care:   Med Surg [16]     ED Arrival Information       Expected   -    Arrival   2/13/2025 17:20    Acuity   Urgent              Means of arrival   Ambulance    Escorted by   [facility]    Service   Hospitalist    Admission type   Emergency              Arrival complaint   Syncope             Chief Complaint   Patient presents with    Syncope     Per EMS pt was given nitro for back pain at [facility]. Pt then tried to ambulate to the bathroom felt dizzy, sat down and had a syncopal episode. Pt has hx of dementia. No complaints at this time.        Initial Presentation: 84 y.o. male with a PMH of CAD status post remote stent, hypertension, Alzheimer systemic show, hyperlipidemia who presents from nursing home after a syncopal episode.  Patient was walking out of the bathroom with his walker and suddenly family noted that he looked pale and lightheaded and he collapsed to the chair.  No head strike but did lose consciousness for several minutes.  Patient has poor recollection of events secondary to underlying dementia.  Per family at bedside, patient received nitro tab after the episode given patient reported back pain.  Patient denies any chest pain or shortness of breath.  Vital signs stable.  EKG with initial ST depressions which resolved on repeat EKG.  Troponin trend 35 > 76.  Patient will be monitored overnight on telemetry with cardiology consult.     Anticipated Length of Stay/Certification Statement: Patient will be admitted on an observation basis with an anticipated length of stay of less than 2 midnights secondary to syncope.         ED Treatment-Medication  Administration from 02/13/2025 1720 to 02/14/2025 0816         Date/Time Order Dose Route Action     02/13/2025 1742 sodium chloride 0.9 % bolus 1,000 mL 1,000 mL Intravenous New Bag     02/14/2025 0029 multi-electrolyte (PLASMALYTE-A/ISOLYTE-S PH 7.4) IV solution 100 mL/hr Intravenous New Bag     02/14/2025 0029 heparin (porcine) subcutaneous injection 5,000 Units 5,000 Units Subcutaneous Given     02/14/2025 0029 aspirin tablet 325 mg 325 mg Oral Given     02/14/2025 0747 multi-electrolyte (PLASMALYTE-A/ISOLYTE-S PH 7.4) IV solution 100 mL/hr Intravenous New Bag            Scheduled Medications:  aspirin, 81 mg, Oral, Daily  donepezil, 10 mg, Oral, QAM  heparin (porcine), 5,000 Units, Subcutaneous, Q8H KECIA  losartan, 50 mg, Oral, Daily  memantine, 10 mg, Oral, Daily  pravastatin, 80 mg, Oral, Daily With Dinner      Continuous IV Infusions:  multi-electrolyte, 100 mL/hr, Intravenous, Continuous      PRN Meds:     ED Triage Vitals [02/13/25 1728]   Temperature Pulse Respirations Blood Pressure SpO2 Pain Score   97.9 °F (36.6 °C) (!) 113 18 123/67 97 % No Pain     Weight (last 2 days)       None            Vital Signs (last 3 days)       Date/Time Temp Pulse Resp BP MAP (mmHg) SpO2 O2 Device Patient Position - Orthostatic VS [place] Coma Scale Score Pain    02/14/25 0703 -- -- -- -- -- -- -- -- -- No Pain    02/14/25 0615 -- 60 20 148/71 102 97 % None (Room air) Lying -- --    02/14/25 0430 -- 60 18 134/74 96 97 % None (Room air) Lying -- --    02/14/25 0036 -- -- -- -- -- -- None (Room air) -- 14 --    02/14/25 0015 -- 76 20 131/69 89 96 % None (Room air) Lying -- --    02/13/25 2100 -- 72 21 117/75 92 -- -- Lying -- --    02/13/25 2000 -- 76 21 119/68 88 97 % None (Room air) Lying -- --    02/13/25 1917 -- -- -- -- -- -- -- -- -- No Pain    02/13/25 1900 -- 88 17 107/61 79 97 % None (Room air) Lying -- --    02/13/25 1729 -- -- -- -- -- 97 % -- -- -- --    02/13/25 1728 97.9 °F (36.6 °C) 113 18 123/67 -- 97 % None  "(Room air) Lying -- No Pain              Pertinent Labs/Diagnostic Test Results:   Radiology:  CT head wo contrast   Final Interpretation by Tyrese Walden MD (02/13 1919)      1.  No acute intracranial abnormality.   2.  Chronic diffuse parenchymal volume loss.   3.  Mild chronic microangiopathic changes.                  Workstation performed: [id number]         XR chest 1 view portable   Final Interpretation by Rodrick Vaughan MD (02/14 0804)      No acute cardiopulmonary disease.            Workstation performed: [id number]           Cardiology:  ECG 12 lead    by Interface, Ris Results In (02/14 0007)      ECG 12 lead    by Interface, Ris Results In (02/13 2141)      ECG 12 lead    by Interface, Ris Results In (02/13 1733)        GI:  No orders to display           Results from last 7 days   Lab Units 02/14/25 0424 02/13/25 1743 02/11/25  1228   WBC Thousand/uL 10.31* 13.95* 10.69*   HEMOGLOBIN g/dL 13.2 15.9 15.4   HEMATOCRIT % 39.9 48.9 46.3   PLATELETS Thousands/uL 146* 166 137*   TOTAL NEUT ABS Thousands/µL  --  9.71* 6.77         Results from last 7 days   Lab Units 02/14/25 0424 02/13/25 1743 02/11/25  1228   SODIUM mmol/L 144 142 142   POTASSIUM mmol/L 3.8 4.6 3.9   CHLORIDE mmol/L 112* 106 107   CO2 mmol/L 20* 25 26   ANION GAP mmol/L 12 11 9   BUN mg/dL 24 27* 23   CREATININE mg/dL 1.39* 1.59* 1.56*   EGFR ml/min/1.73sq m 45 39 39   CALCIUM mg/dL 8.3* 9.4 9.4   MAGNESIUM mg/dL  --   --  2.1   PHOSPHORUS mg/dL  --   --  3.3     Results from last 7 days   Lab Units 02/13/25 1743 02/11/25  1228   AST U/L 37 17   ALT U/L 21 13   ALK PHOS U/L 98 90   TOTAL PROTEIN g/dL 7.8 7.1   ALBUMIN g/dL 4.2 3.8   TOTAL BILIRUBIN mg/dL 0.67 1.04*         Results from last 7 days   Lab Units 02/14/25 0424 02/13/25 1743 02/11/25  1228   GLUCOSE RANDOM mg/dL 95 140 102             No results found for: \"BETA-HYDROXYBUTYRATE\"                   Results from last 7 days   Lab Units 02/14/25  0254 02/14/25  0021 " 02/13/25  2142 02/13/25  2104 02/13/25  1743   HS TNI 0HR ng/L  --  135*  --   --  35   HS TNI 2HR ng/L 119*  --   --  76*  --    HSTNI D2 ng/L -16  --   --  41*  --    HS TNI 4HR ng/L  --   --  135*  --   --    HSTNI D4 ng/L  --   --  100*  --   --              Results from last 7 days   Lab Units 02/13/25  1743   TSH 3RD GENERATON uIU/mL 5.060*                                                                                                           Past Medical History:   Diagnosis Date    Basal cell carcinoma     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 male    Network Utilization Review Department  ATTENTION: Please call with any questions or concerns to [phone] and carefully listen to the prompts so that you are directed to the right person. All voicemails are confidential.   For Discharge needs, contact Care Management DC Support Team at [phone] opt. [phone]  Send all requests for admission clinical reviews, approved or denied determinations and any other requests to dedicated fax number below belonging to the campus where the patient is receiving treatment. List of dedicated fax numbers for the Facilities:  FACILITY NAME UR FAX NUMBER   ADMISSION DENIALS (Administrative/Medical Necessity) [phone]   DISCHARGE SUPPORT TEAM (NETWORK) [phone]   PARENT CHILD HEALTH (Maternity/NICU/Pediatrics) [phone]   [facility] [phone]   [facility] [phone]   [facility] [phone]    [facility] [phone]   [facility] [phone]   [facility] [phone]   [facility] [phone]   [facility] [phone]   [facility] [phone]   [facility] [phone]   [facility] [phone]   [facility] [phone]

## 2025-02-15 LAB
ATRIAL RATE: 112 BPM
ATRIAL RATE: 83 BPM
ATRIAL RATE: 89 BPM
P AXIS: 58 DEGREES
P AXIS: 66 DEGREES
P AXIS: 69 DEGREES
PR INTERVAL: 176 MS
PR INTERVAL: 176 MS
PR INTERVAL: 206 MS
QRS AXIS: 85 DEGREES
QRS AXIS: 86 DEGREES
QRS AXIS: 88 DEGREES
QRSD INTERVAL: 74 MS
QRSD INTERVAL: 74 MS
QRSD INTERVAL: 78 MS
QT INTERVAL: 340 MS
QT INTERVAL: 368 MS
QT INTERVAL: 386 MS
QTC INTERVAL: 432 MS
QTC INTERVAL: 464 MS
QTC INTERVAL: 469 MS
T WAVE AXIS: 41 DEGREES
T WAVE AXIS: 56 DEGREES
T WAVE AXIS: 57 DEGREES
VENTRICULAR RATE: 112 BPM
VENTRICULAR RATE: 83 BPM
VENTRICULAR RATE: 89 BPM

## 2025-02-15 PROCEDURE — 93010 ELECTROCARDIOGRAM REPORT: CPT | Performed by: INTERNAL MEDICINE

## 2025-02-17 ENCOUNTER — TRANSITIONAL CARE MANAGEMENT (OUTPATIENT)
Age: 85
End: 2025-02-17

## 2025-02-25 ENCOUNTER — TELEPHONE (OUTPATIENT)
Age: 85
End: 2025-02-25